# Patient Record
Sex: MALE | Race: ASIAN | NOT HISPANIC OR LATINO | Employment: FULL TIME | URBAN - METROPOLITAN AREA
[De-identification: names, ages, dates, MRNs, and addresses within clinical notes are randomized per-mention and may not be internally consistent; named-entity substitution may affect disease eponyms.]

---

## 2017-04-19 DIAGNOSIS — I10 ESSENTIAL HYPERTENSION: ICD-10-CM

## 2017-04-19 DIAGNOSIS — I48.19 ATRIAL FIBRILLATION, PERSISTENT: Chronic | ICD-10-CM

## 2017-04-19 RX ORDER — METOPROLOL SUCCINATE 50 MG/1
TABLET, EXTENDED RELEASE ORAL
Qty: 30 TABLET | Refills: 2 | Status: ON HOLD | OUTPATIENT
Start: 2017-04-19 | End: 2018-09-05 | Stop reason: SDUPTHER

## 2018-02-08 ENCOUNTER — OFFICE VISIT (OUTPATIENT)
Dept: INTERNAL MEDICINE | Facility: CLINIC | Age: 48
End: 2018-02-08
Payer: COMMERCIAL

## 2018-02-08 VITALS
WEIGHT: 181.88 LBS | BODY MASS INDEX: 28.55 KG/M2 | OXYGEN SATURATION: 98 % | HEIGHT: 67 IN | HEART RATE: 52 BPM | SYSTOLIC BLOOD PRESSURE: 124 MMHG | TEMPERATURE: 98 F | DIASTOLIC BLOOD PRESSURE: 76 MMHG

## 2018-02-08 DIAGNOSIS — J06.9 UPPER RESPIRATORY TRACT INFECTION, UNSPECIFIED TYPE: Primary | ICD-10-CM

## 2018-02-08 DIAGNOSIS — J02.9 SORE THROAT: ICD-10-CM

## 2018-02-08 DIAGNOSIS — Z23 NEED FOR INFLUENZA VACCINATION: ICD-10-CM

## 2018-02-08 DIAGNOSIS — Z00.00 PREVENTATIVE HEALTH CARE: ICD-10-CM

## 2018-02-08 PROCEDURE — 99213 OFFICE O/P EST LOW 20 MIN: CPT | Mod: PO | Performed by: INTERNAL MEDICINE

## 2018-02-08 PROCEDURE — 3008F BODY MASS INDEX DOCD: CPT | Mod: S$GLB,,, | Performed by: INTERNAL MEDICINE

## 2018-02-08 PROCEDURE — 99999 PR PBB SHADOW E&M-EST. PATIENT-LVL III: CPT | Mod: PBBFAC,,, | Performed by: INTERNAL MEDICINE

## 2018-02-08 PROCEDURE — 90471 IMMUNIZATION ADMIN: CPT | Mod: S$GLB,,, | Performed by: INTERNAL MEDICINE

## 2018-02-08 PROCEDURE — 90686 IIV4 VACC NO PRSV 0.5 ML IM: CPT | Mod: S$GLB,,, | Performed by: INTERNAL MEDICINE

## 2018-02-08 PROCEDURE — 99213 OFFICE O/P EST LOW 20 MIN: CPT | Mod: 25,S$GLB,, | Performed by: INTERNAL MEDICINE

## 2018-02-08 NOTE — PATIENT INSTRUCTIONS
flonase nasal spray 2 spays each nostril for nasal congestion, post nasal drip, runny nose    Delsym as needed for cough    Allegra or zyrtec for allergies, post nasal drip, runny nose, watery eyes.    cepacol throat lozenges for sore throat    mucinex for chest congestion.     Tylenol or ibuprofen for pain.

## 2018-02-08 NOTE — PROGRESS NOTES
"Subjective:      Patient ID: Renzo Salgado is a 47 y.o. male.    Chief Complaint: Sore Throat (x 3 days) and Cough    48 yo with Patient Active Problem List:     Atrial fibrillation, persistent     Essential hypertension     Sinusitis    Past Medical History:  No date: Atrial fibrillation  No date: Hypertension  No date: Sleep apnea    Here today c/o sore throat.      Sore Throat    This is a new problem. The current episode started in the past 7 days. The problem has been rapidly improving. Neither side of throat is experiencing more pain than the other. There has been no fever. The pain is mild. Associated symptoms include congestion (nasal) and coughing. Pertinent negatives include no abdominal pain, diarrhea, drooling, ear discharge, ear pain, headaches, hoarse voice, plugged ear sensation, neck pain, shortness of breath or vomiting. He has had no exposure to strep or mono. He has tried cool liquids for the symptoms. The treatment provided moderate relief.     Review of Systems   Constitutional: Negative for chills and fever.   HENT: Positive for congestion (nasal) and sore throat. Negative for drooling, ear discharge, ear pain and hoarse voice.    Respiratory: Positive for cough. Negative for shortness of breath and wheezing.    Cardiovascular: Negative for chest pain.   Gastrointestinal: Negative for abdominal pain, blood in stool, diarrhea and vomiting.   Musculoskeletal: Negative for neck pain.   Neurological: Negative for headaches.     Objective:   /76 (BP Location: Right arm, Patient Position: Sitting)   Pulse (!) 52   Temp 97.7 °F (36.5 °C) (Tympanic)   Ht 5' 7" (1.702 m)   Wt 82.5 kg (181 lb 14.1 oz)   SpO2 98%   BMI 28.49 kg/m²     Physical Exam   Constitutional: He is oriented to person, place, and time. He appears well-developed and well-nourished. No distress.   HENT:   Head: Normocephalic and atraumatic.   Right Ear: Tympanic membrane normal.   Left Ear: Tympanic membrane normal. "   Nose: Rhinorrhea present. No mucosal edema. Right sinus exhibits no maxillary sinus tenderness and no frontal sinus tenderness. Left sinus exhibits no maxillary sinus tenderness and no frontal sinus tenderness.   Mouth/Throat: Posterior oropharyngeal erythema present. No oropharyngeal exudate or posterior oropharyngeal edema.   Eyes: EOM are normal. Pupils are equal, round, and reactive to light.   Neck: Neck supple.   Cardiovascular: Normal rate and regular rhythm.    Pulmonary/Chest: Breath sounds normal. He has no wheezes. He has no rales.   Lymphadenopathy:     He has no cervical adenopathy.   Neurological: He is alert and oriented to person, place, and time.   Skin: Skin is warm and dry.   Psychiatric: He has a normal mood and affect. His behavior is normal.       Assessment:     1. Upper respiratory tract infection, unspecified type    2. Sore throat    3. Need for influenza vaccination    4. Preventative health care      Plan:   Upper respiratory tract infection, unspecified type    Sore throat    Need for influenza vaccination    Preventative health care  -     Comprehensive metabolic panel; Future; Expected date: 02/08/2018  -     CBC auto differential; Future; Expected date: 02/08/2018  -     Lipid panel; Future; Expected date: 02/08/2018  -     TSH; Future; Expected date: 02/08/2018  -     Hemoglobin A1c; Future; Expected date: 02/08/2018  -     PSA, Screening; Future; Expected date: 02/08/2018    Other orders  -     Influenza - Quadrivalent (3 years & older) (PF)    flonase nasal spray 2 spays each nostril for nasal congestion, post nasal drip, runny nose    Delsym as needed for cough    Allegra or zyrtec for allergies, post nasal drip, runny nose, watery eyes.    cepacol throat lozenges for sore throat    mucinex for chest congestion.     Tylenol or ibuprofen for pain.                Problem List Items Addressed This Visit     None      Visit Diagnoses     Upper respiratory tract infection,  unspecified type    -  Primary    Sore throat        Need for influenza vaccination        Preventative health care        Relevant Orders    Comprehensive metabolic panel    CBC auto differential    Lipid panel    TSH    Hemoglobin A1c    PSA, Screening          Follow-up in about 2 weeks (around 2/22/2018), or if symptoms worsen or fail to improve.

## 2018-02-09 ENCOUNTER — PATIENT OUTREACH (OUTPATIENT)
Dept: ADMINISTRATIVE | Facility: HOSPITAL | Age: 48
End: 2018-02-09

## 2018-06-10 ENCOUNTER — HOSPITAL ENCOUNTER (EMERGENCY)
Facility: HOSPITAL | Age: 48
Discharge: HOME | End: 2018-06-10
Attending: EMERGENCY MEDICINE
Payer: COMMERCIAL

## 2018-06-10 VITALS
DIASTOLIC BLOOD PRESSURE: 79 MMHG | TEMPERATURE: 98 F | BODY MASS INDEX: 27.88 KG/M2 | OXYGEN SATURATION: 98 % | HEART RATE: 80 BPM | SYSTOLIC BLOOD PRESSURE: 165 MMHG | WEIGHT: 178 LBS | RESPIRATION RATE: 18 BRPM

## 2018-06-10 DIAGNOSIS — L50.9 FULL BODY HIVES: Primary | ICD-10-CM

## 2018-06-10 PROCEDURE — 99283 EMERGENCY DEPT VISIT LOW MDM: CPT

## 2018-06-10 RX ORDER — DIPHENHYDRAMINE HCL 50 MG
50 CAPSULE ORAL EVERY 6 HOURS PRN
Qty: 20 CAPSULE | Refills: 0 | Status: SHIPPED | OUTPATIENT
Start: 2018-06-10 | End: 2018-07-09

## 2018-06-10 NOTE — ED PROVIDER NOTES
SCRIBE #1 NOTE: I, Janette Mcgee, am scribing for, and in the presence of, Lesly Guzman MD. I have scribed the entire note.      History      Chief Complaint   Patient presents with    Urticaria     pt treated for hives, but wants second opinion       Review of patient's allergies indicates:  No Known Allergies     HPI   HPI    6/10/2018, 7:21 AM   History obtained from the patient      History of Present Illness: Renzo Salgado is a 47 y.o. male patient who presents to the Emergency Department for rash which onset gradually yesterday. Patient reports eating shrimp yesterday and then began having hives. Patient reports being evaluated for sxs yesterday at Urgent Care and was given steroid shot and prescribed Zyrtec, Pepcid, and steroid pack. Patient states he has not had the prescriptions filled. Patient denies prior tx with Benadryl. Symptoms are constant and moderate in severity. The patient describes the sxs as located to extremities and trunk. No mitigating or exacerbating factors reported. Associated sxs include pruritis. Patient denies any fever, chills, tongue swelling, difficulty swallowing, CP, SOB, wheezing, abd pain, N/V/D, and all other sxs at this time. No further complaints or concerns at this time.     Arrival mode: Personal vehicle      PCP: Fadi Damico MD       Past Medical History:  Past Medical History:   Diagnosis Date    Atrial fibrillation     Hypertension     Sleep apnea        Past Surgical History:  History reviewed. No pertinent surgical history.      Family History:  Family History   Problem Relation Age of Onset    Hypertension Mother     Melanoma Neg Hx     Psoriasis Neg Hx     Lupus Neg Hx        Social History:  Social History     Social History Main Topics    Smoking status: Never Smoker    Smokeless tobacco: Never Used    Alcohol use No    Drug use: No    Sexual activity: Unknown       ROS   Review of Systems   Constitutional: Negative for chills and  fever.   HENT: Negative for sore throat and trouble swallowing.         (-) tongue swelling   Respiratory: Negative for shortness of breath and wheezing.    Cardiovascular: Negative for chest pain.   Gastrointestinal: Negative for abdominal pain, diarrhea, nausea and vomiting.   Genitourinary: Negative for dysuria.   Musculoskeletal: Negative for back pain.   Skin: Positive for rash (extremities and trunk).        (+) pruritis   Neurological: Negative for weakness.   Hematological: Does not bruise/bleed easily.   All other systems reviewed and are negative.      Physical Exam      Initial Vitals [06/10/18 0700]   BP Pulse Resp Temp SpO2   (!) 165/79 80 18 98 °F (36.7 °C) 98 %      MAP       107.67          Physical Exam  Nursing Notes and Vital Signs Reviewed.  Constitutional: Patient is in no acute distress. Well-developed and well-nourished.  Head: Atraumatic. Normocephalic.  Eyes: PERRL. EOM intact. Conjunctivae are not pale. No scleral icterus.  ENT: Mucous membranes are moist. Oropharynx is clear and symmetric. No tongue edema, airway patent.  Neck: Supple. Full ROM. No lymphadenopathy.  Cardiovascular: Regular rate. Regular rhythm. No murmurs, rubs, or gallops. Distal pulses are 2+ and symmetric.  Pulmonary/Chest: No respiratory distress. Clear to auscultation bilaterally. No wheezing or rales.  Abdominal: Soft and non-distended.  There is no tenderness.  No rebound, guarding, or rigidity. Good bowel sounds.  Genitourinary: No CVA tenderness  Musculoskeletal: Moves all extremities. No obvious deformities. No edema. No calf tenderness.  Skin: Warm and dry. Diffuse hives to trunk and BUE.   Neurological:  Alert, awake, and appropriate.  Normal speech.  No acute focal neurological deficits are appreciated.  Psychiatric: Normal affect. Good eye contact. Appropriate in content.    ED Course    Procedures  ED Vital Signs:  Vitals:    06/10/18 0700   BP: (!) 165/79   Pulse: 80   Resp: 18   Temp: 98 °F (36.7 °C)    SpO2: 98%   Weight: 80.7 kg (178 lb)              The Emergency Provider reviewed the vital signs and test results, which are outlined above.    ED Discussion     7:30 AM: Reassessed pt at this time. Discussed with pt all pertinent ED information and results. Discussed pt dx and plan of tx. Gave pt all f/u and return to the ED instructions. All questions and concerns were addressed at this time. Pt expresses understanding of information and instructions, and is comfortable with plan to discharge. Pt is stable for discharge.    I discussed with patient and/or family/caretaker that evaluation in the ED does not suggest any emergent or life threatening medical conditions requiring immediate intervention beyond what was provided in the ED, and I believe patient is safe for discharge.  Regardless, an unremarkable evaluation in the ED does not preclude the development or presence of a serious of life threatening condition. As such, patient was instructed to return immediately for any worsening or change in current symptoms.      ED Medication(s):  Medications - No data to display    New Prescriptions    DIPHENHYDRAMINE (BENADRYL) 50 MG CAPSULE    Take 1 capsule (50 mg total) by mouth every 6 (six) hours as needed for Itching or Allergies.       Follow-up Information     Fadi Damico MD. Schedule an appointment as soon as possible for a visit in 2 days.    Specialty:  Internal Medicine  Why:  Return to the Emergency Room, If symptoms worsen  Contact information:  [address]  [place] [address]  [phone]                     Medical Decision Making              Scribe Attestation:   Scribe #1: I performed the above scribed service and the documentation accurately describes the services I performed. I attest to the accuracy of the note.    Attending:   Physician Attestation Statement for Scribe #1: I, Lesly Guzman MD, personally performed the services described in this documentation, as scribed by  Janette Mcgee, in my presence, and it is both accurate and complete.          Clinical Impression       ICD-10-CM ICD-9-CM   1. Full body hives L50.9 708.8       Disposition:   Disposition: Discharged  Condition: Stable         Lesly Guzman MD  06/10/18 0952

## 2018-07-07 ENCOUNTER — OFFICE VISIT (OUTPATIENT)
Dept: URGENT CARE | Facility: CLINIC | Age: 48
End: 2018-07-07
Payer: COMMERCIAL

## 2018-07-07 ENCOUNTER — PATIENT MESSAGE (OUTPATIENT)
Dept: INTERNAL MEDICINE | Facility: CLINIC | Age: 48
End: 2018-07-07

## 2018-07-07 VITALS
HEIGHT: 67 IN | DIASTOLIC BLOOD PRESSURE: 84 MMHG | TEMPERATURE: 98 F | OXYGEN SATURATION: 97 % | HEART RATE: 86 BPM | WEIGHT: 183.19 LBS | RESPIRATION RATE: 17 BRPM | SYSTOLIC BLOOD PRESSURE: 114 MMHG | BODY MASS INDEX: 28.75 KG/M2

## 2018-07-07 DIAGNOSIS — I48.19 ATRIAL FIBRILLATION, PERSISTENT: Chronic | ICD-10-CM

## 2018-07-07 DIAGNOSIS — Z79.01 ON ANTICOAGULANT THERAPY: ICD-10-CM

## 2018-07-07 DIAGNOSIS — R31.0 GROSS HEMATURIA: Primary | ICD-10-CM

## 2018-07-07 LAB
BILIRUB UR QL STRIP: ABNORMAL
CLARITY UR: ABNORMAL
COLOR UR: ABNORMAL
GLUCOSE UR QL STRIP: ABNORMAL
HGB UR QL STRIP: ABNORMAL
KETONES UR QL STRIP: ABNORMAL
LEUKOCYTE ESTERASE UR QL STRIP: ABNORMAL
MICROSCOPIC COMMENT: ABNORMAL
NITRITE UR QL STRIP: ABNORMAL
PH UR STRIP: ABNORMAL [PH] (ref 5–8)
PROT UR QL STRIP: ABNORMAL
RBC #/AREA URNS HPF: >100 /HPF (ref 0–4)
SP GR UR STRIP: ABNORMAL (ref 1–1.03)
URN SPEC COLLECT METH UR: ABNORMAL
WBC #/AREA URNS HPF: 0 /HPF (ref 0–5)
WBC CLUMPS URNS QL MICRO: ABNORMAL

## 2018-07-07 PROCEDURE — 3008F BODY MASS INDEX DOCD: CPT | Mod: CPTII,S$GLB,, | Performed by: NURSE PRACTITIONER

## 2018-07-07 PROCEDURE — 3079F DIAST BP 80-89 MM HG: CPT | Mod: CPTII,S$GLB,, | Performed by: NURSE PRACTITIONER

## 2018-07-07 PROCEDURE — 87086 URINE CULTURE/COLONY COUNT: CPT

## 2018-07-07 PROCEDURE — 3074F SYST BP LT 130 MM HG: CPT | Mod: CPTII,S$GLB,, | Performed by: NURSE PRACTITIONER

## 2018-07-07 PROCEDURE — 99214 OFFICE O/P EST MOD 30 MIN: CPT | Mod: S$GLB,,, | Performed by: NURSE PRACTITIONER

## 2018-07-07 PROCEDURE — 99999 PR PBB SHADOW E&M-EST. PATIENT-LVL IV: CPT | Mod: PBBFAC,,, | Performed by: NURSE PRACTITIONER

## 2018-07-07 PROCEDURE — 81000 URINALYSIS NONAUTO W/SCOPE: CPT | Mod: PO

## 2018-07-07 NOTE — PATIENT INSTRUCTIONS
Blood in the Urine    Blood in the urine (hematuria) has many possible causes. If it occurs after an injury (such as a car accident or fall), it is most often a sign of bruising to the kidney or bladder. Common causes of blood in the urine include urinary tract infections, kidney stones, inflammation, tumors, or certain other diseases of the kidney or bladder. Menstruation can cause blood to appear in the urine sample, although it is not coming from the urinary tract.  If only a trace amount of blood is present, it will show up on the urine test, even though the urine may be yellow and not pink or red. This may occur with any of the above conditions, as well as heavy exercise or high fever. In this case, your doctor may want to repeat the urine test on another day. This will show if the blood is still present. If it is, then other tests can be done to find out the cause.  Home care  Follow these home care guidelines:  · If your urine does not appear bloody (pink, brown or red) then you do not need to restrict your activity in any way.  · If you can see blood in your urine, rest and avoid heavy exertion until your next exam. Do not use aspirin, blood thinners, or anti-platelet or anti-inflammatory medicines. These include ibuprofen and naproxen. These thin the blood and may increase bleeding.  Follow-up care  Follow up with your healthcare provider, or as advised. If you were injured and had blood in your urine, you should have a repeat urine test in 1 to 2 days. Contact your doctor for this test.  A radiologist will review any X-rays that were taken. You will be told of any new findings that may affect your care.  When to seek medical advice  Call your healthcare provider right away if any of these occur:  · Bright red blood or blood clots in the urine (if you did not have this before)  · Weakness, dizziness or fainting  · New groin, abdominal, or back pain  · Fever of 100.4ºF (38ºC) or higher, or as directed by  your healthcare provider  · Repeated vomiting  · Bleeding from the nose or gums or easy bruising  Date Last Reviewed: 9/1/2016  © 6699-7324 [employer]. [address] [place] [address], [place] [address]. All rights reserved. This information is not intended as a substitute for professional medical care. Always follow your healthcare professional's instructions.

## 2018-07-07 NOTE — PROGRESS NOTES
"Subjective:      Patient ID: Renzo Salgado is a 47 y.o. male.    Chief Complaint: Hematuria    Hematuria   This is a new problem. The current episode started today. The problem is unchanged. He describes the hematuria as gross hematuria. The hematuria occurs throughout his entire urinary stream. He reports no clotting in his urine stream. He is experiencing no pain. He describes his urine color as bright red. Irritative symptoms do not include frequency, nocturia or urgency. Pertinent negatives include no abdominal pain, dysuria, fever, flank pain, genital pain, inability to urinate, nausea or vomiting. (A-fib on Eliquis)     Review of Systems   Constitutional: Negative.  Negative for fever.   Respiratory: Negative.    Cardiovascular: Negative.    Gastrointestinal: Negative.  Negative for abdominal pain, nausea and vomiting.   Genitourinary: Positive for hematuria. Negative for dysuria, flank pain, frequency, nocturia and urgency.   Musculoskeletal: Negative.    Skin: Negative.    Neurological: Negative.    Hematological: Negative for adenopathy. Bruises/bleeds easily.       Objective:   /84 (BP Location: Right arm, Patient Position: Sitting, BP Method: Small (Manual))   Pulse 86   Temp 98.4 °F (36.9 °C) (Tympanic)   Resp 17   Ht 5' 7" (1.702 m)   Wt 83.1 kg (183 lb 3.2 oz)   SpO2 97%   BMI 28.69 kg/m²   Physical Exam   Constitutional: He is oriented to person, place, and time. He appears well-developed and well-nourished. No distress.   HENT:   Head: Normocephalic and atraumatic.   Neck: Normal range of motion. Neck supple.   Cardiovascular: Normal rate.    Pulmonary/Chest: Effort normal. No respiratory distress.   Abdominal: Soft. There is no tenderness.   Neurological: He is alert and oriented to person, place, and time.   Skin: Skin is warm and dry. No rash noted. He is not diaphoretic.   Nursing note and vitals reviewed.    Assessment:      1. Gross hematuria    2. Atrial fibrillation, " persistent    3. On anticoagulant therapy       Plan:   Gross hematuria  -     Urinalysis  -     Urine culture    Atrial fibrillation, persistent    On anticoagulant therapy  Comments:  Hold Eliquis for now. Keep follow up with Dr. Damico Monday. ER for difficulty passing urine, abdominal pain, chest pain, or shortness of breath.    Gross blood noted from sample. Unable to perform dipstick to rule out infection. Will send off for UA and follow up with Leigh Ann Brennangarcia if antibiotics are necessary.  Hold Eliquis today and tomorrow. Discussed risks with holding medication vs bleeding risk with continuing medication.  Keep appointment with Dr. Damico on Monday for recheck.  Red flag symptoms that warrant emergency department eval were reviewed.

## 2018-07-08 LAB — BACTERIA UR CULT: NO GROWTH

## 2018-07-09 ENCOUNTER — HOSPITAL ENCOUNTER (OUTPATIENT)
Dept: RADIOLOGY | Facility: HOSPITAL | Age: 48
Discharge: HOME | End: 2018-07-09
Attending: INTERNAL MEDICINE
Payer: COMMERCIAL

## 2018-07-09 ENCOUNTER — OFFICE VISIT (OUTPATIENT)
Dept: INTERNAL MEDICINE | Facility: CLINIC | Age: 48
End: 2018-07-09
Payer: COMMERCIAL

## 2018-07-09 VITALS
HEART RATE: 70 BPM | HEIGHT: 67 IN | TEMPERATURE: 99 F | OXYGEN SATURATION: 98 % | WEIGHT: 183.63 LBS | SYSTOLIC BLOOD PRESSURE: 120 MMHG | DIASTOLIC BLOOD PRESSURE: 90 MMHG | BODY MASS INDEX: 28.82 KG/M2

## 2018-07-09 DIAGNOSIS — R31.9 HEMATURIA, UNSPECIFIED TYPE: Primary | ICD-10-CM

## 2018-07-09 DIAGNOSIS — R31.9 HEMATURIA, UNSPECIFIED TYPE: ICD-10-CM

## 2018-07-09 DIAGNOSIS — I48.19 ATRIAL FIBRILLATION, PERSISTENT: Chronic | ICD-10-CM

## 2018-07-09 PROCEDURE — 74018 RADEX ABDOMEN 1 VIEW: CPT | Mod: 26,,, | Performed by: RADIOLOGY

## 2018-07-09 PROCEDURE — 99214 OFFICE O/P EST MOD 30 MIN: CPT | Mod: S$GLB,,, | Performed by: INTERNAL MEDICINE

## 2018-07-09 PROCEDURE — 3080F DIAST BP >= 90 MM HG: CPT | Mod: CPTII,S$GLB,, | Performed by: INTERNAL MEDICINE

## 2018-07-09 PROCEDURE — 99999 PR PBB SHADOW E&M-EST. PATIENT-LVL III: CPT | Mod: PBBFAC,,, | Performed by: INTERNAL MEDICINE

## 2018-07-09 PROCEDURE — 74018 RADEX ABDOMEN 1 VIEW: CPT | Mod: TC,FY,PO

## 2018-07-09 PROCEDURE — 3008F BODY MASS INDEX DOCD: CPT | Mod: CPTII,S$GLB,, | Performed by: INTERNAL MEDICINE

## 2018-07-09 PROCEDURE — 3074F SYST BP LT 130 MM HG: CPT | Mod: CPTII,S$GLB,, | Performed by: INTERNAL MEDICINE

## 2018-07-09 NOTE — PROGRESS NOTES
"Subjective:      Patient ID: Renzo Salgado is a 47 y.o. male.    Chief Complaint: Follow-up (urgent care on 7/7/18)    48 yo with Patient Active Problem List:     Atrial fibrillation, persistent     Essential hypertension    Here today for eval of gross  hematuria. Dx recently with urgent care. No gross hematuria x 2 days.  Lower abd pain alejandro nearly resolved. Now at level 2/10 and intermittent. No pain today.       Review of Systems   Constitutional: Negative for chills and fever.   HENT: Negative for ear pain and sore throat.    Respiratory: Negative for cough.    Cardiovascular: Negative for chest pain.   Gastrointestinal: Negative for abdominal pain and blood in stool.   Genitourinary: Negative for decreased urine volume, difficulty urinating, dysuria, flank pain, penile pain and urgency.   Musculoskeletal: Negative for back pain.   Neurological: Negative for seizures and syncope.     Objective:   BP (!) 120/90 (BP Location: Left arm, Patient Position: Sitting)   Pulse 70   Temp 98.7 °F (37.1 °C) (Tympanic)   Ht 5' 7" (1.702 m)   Wt 83.3 kg (183 lb 10.3 oz)   SpO2 98%   BMI 28.76 kg/m²     Physical Exam   Constitutional: He is oriented to person, place, and time. He appears well-developed and well-nourished. No distress.   HENT:   Head: Atraumatic.   Right Ear: External ear normal.   Eyes: Conjunctivae and EOM are normal.   Cardiovascular: Normal rate.    irreg   Pulmonary/Chest: Effort normal and breath sounds normal.   Abdominal: Soft. Bowel sounds are normal.   Musculoskeletal: He exhibits no edema.   Neurological: He is alert and oriented to person, place, and time.   Skin: Skin is warm and dry.   Psychiatric: He has a normal mood and affect. His behavior is normal.   no cva ttp    Assessment:     1. Hematuria, unspecified type    2. Atrial fibrillation, persistent      Plan:   Hematuria, unspecified type  -     Urinalysis; Future; Expected date: 07/09/2018  -     X-Ray KUB; Future; Expected " date: 07/09/2018    Atrial fibrillation, persistent    stable  Holding eliquis due to bleeding    Lab Frequency Next Occurrence   Comprehensive metabolic panel Once 02/08/2018   Lipid panel Once 02/08/2018   TSH Once 02/08/2018   Hemoglobin A1c Once 02/08/2018       Problem List Items Addressed This Visit        Cardiac/Vascular    Atrial fibrillation, persistent (Chronic)      Other Visit Diagnoses     Hematuria, unspecified type    -  Primary    Relevant Orders    Urinalysis (Completed)    X-Ray KUB (Completed)          Follow-up if symptoms worsen or fail to improve.

## 2018-07-10 ENCOUNTER — PATIENT MESSAGE (OUTPATIENT)
Dept: INTERNAL MEDICINE | Facility: CLINIC | Age: 48
End: 2018-07-10

## 2018-07-10 DIAGNOSIS — N39.0 URINARY TRACT INFECTION WITHOUT HEMATURIA, SITE UNSPECIFIED: Primary | ICD-10-CM

## 2018-07-10 DIAGNOSIS — R31.9 HEMATURIA, UNSPECIFIED TYPE: Primary | ICD-10-CM

## 2018-07-10 DIAGNOSIS — R31.9 HEMATURIA, UNSPECIFIED TYPE: ICD-10-CM

## 2018-07-10 NOTE — TELEPHONE ENCOUNTER
Continues with mild blood in urine. Kidney stones in left kidney identified on x-ray.Remain off of Eliquis.  Recheck urinalysis in 3 days. Plan will be to resume eliquis if bleeding resolves.  If gross bleeding recurs or pain worsens pt will need to see urology.

## 2018-07-13 ENCOUNTER — LAB VISIT (OUTPATIENT)
Dept: LAB | Facility: HOSPITAL | Age: 48
End: 2018-07-13
Attending: INTERNAL MEDICINE
Payer: COMMERCIAL

## 2018-07-13 ENCOUNTER — PATIENT MESSAGE (OUTPATIENT)
Dept: INTERNAL MEDICINE | Facility: CLINIC | Age: 48
End: 2018-07-13

## 2018-07-13 DIAGNOSIS — R31.9 HEMATURIA, UNSPECIFIED TYPE: Primary | ICD-10-CM

## 2018-07-13 DIAGNOSIS — R31.9 HEMATURIA, UNSPECIFIED TYPE: ICD-10-CM

## 2018-07-13 LAB
BACTERIA #/AREA URNS HPF: NORMAL /HPF
BILIRUB UR QL STRIP: NEGATIVE
CLARITY UR: CLEAR
COLOR UR: ABNORMAL
GLUCOSE UR QL STRIP: NEGATIVE
HGB UR QL STRIP: ABNORMAL
KETONES UR QL STRIP: NEGATIVE
LEUKOCYTE ESTERASE UR QL STRIP: NEGATIVE
MICROSCOPIC COMMENT: NORMAL
NITRITE UR QL STRIP: NEGATIVE
PH UR STRIP: 7 [PH] (ref 5–8)
PROT UR QL STRIP: NEGATIVE
RBC #/AREA URNS HPF: 3 /HPF (ref 0–4)
SP GR UR STRIP: <=1.005 (ref 1–1.03)
URN SPEC COLLECT METH UR: ABNORMAL
WBC #/AREA URNS HPF: 1 /HPF (ref 0–5)

## 2018-07-13 PROCEDURE — 81000 URINALYSIS NONAUTO W/SCOPE: CPT | Mod: PO

## 2018-07-13 NOTE — TELEPHONE ENCOUNTER
Blood in urine is improved. rec resume eliquis. Recheck ua on Monday. Will need to stop eliquis and see urology if he sees blood in his urine.

## 2018-07-16 ENCOUNTER — TELEPHONE (OUTPATIENT)
Dept: INTERNAL MEDICINE | Facility: CLINIC | Age: 48
End: 2018-07-16

## 2018-07-16 ENCOUNTER — LAB VISIT (OUTPATIENT)
Dept: LAB | Facility: HOSPITAL | Age: 48
End: 2018-07-16
Attending: INTERNAL MEDICINE
Payer: COMMERCIAL

## 2018-07-16 DIAGNOSIS — R31.9 HEMATURIA, UNSPECIFIED TYPE: Primary | ICD-10-CM

## 2018-07-16 DIAGNOSIS — R31.9 HEMATURIA, UNSPECIFIED TYPE: ICD-10-CM

## 2018-07-16 DIAGNOSIS — N20.0 NEPHROLITHIASIS: ICD-10-CM

## 2018-07-16 LAB
BILIRUB UR QL STRIP: NEGATIVE
CLARITY UR: CLEAR
COLOR UR: YELLOW
GLUCOSE UR QL STRIP: NEGATIVE
HGB UR QL STRIP: ABNORMAL
KETONES UR QL STRIP: NEGATIVE
LEUKOCYTE ESTERASE UR QL STRIP: NEGATIVE
MICROSCOPIC COMMENT: ABNORMAL
NITRITE UR QL STRIP: NEGATIVE
PH UR STRIP: 7 [PH] (ref 5–8)
PROT UR QL STRIP: NEGATIVE
RBC #/AREA URNS HPF: >100 /HPF (ref 0–4)
SP GR UR STRIP: 1.01 (ref 1–1.03)
URN SPEC COLLECT METH UR: ABNORMAL

## 2018-07-16 PROCEDURE — 81000 URINALYSIS NONAUTO W/SCOPE: CPT | Mod: PO

## 2018-07-16 NOTE — TELEPHONE ENCOUNTER
Unfortunately blood has returned to urine. Needs to stop eliquis and see urology. Also needs appt with cardiology to discuss anticoagulation in light of bleeding on eliquis.

## 2018-07-17 NOTE — TELEPHONE ENCOUNTER
Spoke with pt, notified him that unfortunately blood has returned to urine and needs to stop Eliquis and see Urology. Also, needs to see Cardiology to discuss anticoagulation of bleeding on Eliquis. Pt verbalized understanding and scheduled next available Urology appointment for 10/24/18 at 7 am. Pt added to wait list. Also, informed pt to contact insurance company to see which other Urologists are in network and that may have a sooner appointment.

## 2018-07-18 ENCOUNTER — PATIENT MESSAGE (OUTPATIENT)
Dept: INTERNAL MEDICINE | Facility: CLINIC | Age: 48
End: 2018-07-18

## 2018-07-18 ENCOUNTER — TELEPHONE (OUTPATIENT)
Dept: INTERNAL MEDICINE | Facility: CLINIC | Age: 48
End: 2018-07-18

## 2018-08-27 ENCOUNTER — HOSPITAL ENCOUNTER (OUTPATIENT)
Dept: RADIOLOGY | Facility: HOSPITAL | Age: 48
Discharge: HOME | End: 2018-08-27
Attending: UROLOGY
Payer: COMMERCIAL

## 2018-08-27 ENCOUNTER — TELEPHONE (OUTPATIENT)
Dept: RADIOLOGY | Facility: HOSPITAL | Age: 48
End: 2018-08-27

## 2018-08-27 ENCOUNTER — OFFICE VISIT (OUTPATIENT)
Dept: UROLOGY | Facility: CLINIC | Age: 48
End: 2018-08-27
Payer: COMMERCIAL

## 2018-08-27 VITALS
WEIGHT: 181.69 LBS | BODY MASS INDEX: 28.52 KG/M2 | SYSTOLIC BLOOD PRESSURE: 128 MMHG | HEIGHT: 67 IN | HEART RATE: 50 BPM | DIASTOLIC BLOOD PRESSURE: 84 MMHG

## 2018-08-27 DIAGNOSIS — N20.0 RENAL STONE: ICD-10-CM

## 2018-08-27 DIAGNOSIS — N20.0 RENAL STONE: Primary | ICD-10-CM

## 2018-08-27 LAB
BILIRUB SERPL-MCNC: NORMAL MG/DL
BLOOD URINE, POC: 50
COLOR, POC UA: YELLOW
GLUCOSE UR QL STRIP: NORMAL
KETONES UR QL STRIP: NORMAL
LEUKOCYTE ESTERASE URINE, POC: NORMAL
NITRITE, POC UA: NORMAL
PH, POC UA: 5
PROTEIN, POC: NORMAL
SPECIFIC GRAVITY, POC UA: 1.01
UROBILINOGEN, POC UA: NORMAL

## 2018-08-27 PROCEDURE — 93000 ELECTROCARDIOGRAM COMPLETE: CPT | Mod: S$GLB,,, | Performed by: INTERNAL MEDICINE

## 2018-08-27 PROCEDURE — 74018 RADEX ABDOMEN 1 VIEW: CPT | Mod: 26,,, | Performed by: RADIOLOGY

## 2018-08-27 PROCEDURE — 74018 RADEX ABDOMEN 1 VIEW: CPT | Mod: TC

## 2018-08-27 PROCEDURE — 99999 PR PBB SHADOW E&M-EST. PATIENT-LVL IV: CPT | Mod: PBBFAC,,, | Performed by: UROLOGY

## 2018-08-27 PROCEDURE — 71046 X-RAY EXAM CHEST 2 VIEWS: CPT | Mod: 26,,, | Performed by: RADIOLOGY

## 2018-08-27 PROCEDURE — 71046 X-RAY EXAM CHEST 2 VIEWS: CPT | Mod: TC

## 2018-08-27 PROCEDURE — 99244 OFF/OP CNSLTJ NEW/EST MOD 40: CPT | Mod: 25,S$GLB,, | Performed by: UROLOGY

## 2018-08-27 PROCEDURE — 81002 URINALYSIS NONAUTO W/O SCOPE: CPT | Mod: S$GLB,,, | Performed by: UROLOGY

## 2018-08-27 NOTE — PROGRESS NOTES
Chief Complaint: Hematuria/Kidney Stones    HPI:   8/27/18: 47 yo man on eliquis saw gross hematuria last month, had a KUB that shows two 5-6mm left renal stones.  He went to Dr. Myers and a CT was done that shows the same stones.  Was scheduled for ESWL and his HR went to 130 during the procedure.  Was sent away for cardiology clearance that was given by cardiologist with low risk.  Happened again at 120 then 80-85 HR and Dr. Myers did not want to schedule it a third time.  Seems no anesthesia was given for the procedures.  ESWL was never started.  No abd/pelvic pain and no exac/rel factors.  No hematuria.  No urolithiasis.  No urinary bother.  No  history.  Normal sexual function.  Referred by Dr. Damico.    Allergies:  Patient has no known allergies.    Medications:  has a current medication list which includes the following prescription(s): dronedarone, eliquis, lisinopril, and metoprolol succinate.    Review of Systems:  General: No fever, chills, fatigability, or weight loss.  Skin: No rashes, itching, or changes in color or texture of skin.  Chest: Denies [place], cyanosis, wheezing, cough, and sputum production.  Abdomen: Appetite fine. No weight loss. Denies diarrhea, abdominal pain, hematemesis, or blood in stool.  Musculoskeletal: No joint stiffness or swelling. Denies back pain.  : As above.  All other review of systems negative.    PMH:   has a past medical history of Atrial fibrillation, Hypertension, and Sleep apnea.    PSH:   has no past surgical history on file.    FamHx: family history includes Hypertension in his mother.    SocHx:  reports that  has never smoked. he has never used smokeless tobacco. He reports that he does not drink alcohol or use drugs.      Physical Exam:  Vitals:    08/27/18 0713   BP: 128/84   Pulse: (!) 50     General: A&Ox3, no apparent distress, no deformities  Neck: No masses, normal thyroid  Lungs: normal inspiration, no use of accessory muscles  Heart: normal pulse,  no arrhythmias  Abdomen: Soft, NT, ND, no masses, no hernias, no hepatosplenomegaly  Lymphatic: Neck and groin nodes negative  Skin: The skin is warm and dry. No jaundice.  Ext: No c/c/e.  : Test desc alejandro, no abnormalities of epididymus. Penis normal, with normal penile and scrotal skin. Meatus normal.     Labs/Studies: Urinalysis performed in clinic, summary: UA normal exc 50 blood    Impression/Plan:   1. KUB/CT RSS to confirm current stone findings, enroute to ESWL with local/mac.  Risks/benefits reviewed, consents on chart.

## 2018-08-27 NOTE — LETTER
August 27, 2018      Fadi Damico MD  [address] [facility] [address]  [place] [address]           [facility]  [address] [place] [id number]  Phone: [phone]  Fax: [phone]          Patient: Renzo Salgado   MR Number: [id number]   YOB: 1970   Date of Visit: 8/27/2018       Dear Dr. Fadi Damico:    Thank you for referring Renzo Salgado to me for evaluation. Attached you will find relevant portions of my assessment and plan of care.    If you have questions, please do not hesitate to call me. I look forward to following Renzo Salgado along with you.    Sincerely,    Ricky Baker IV, MD    Enclosure  CC:  No Recipients    If you would like to receive this communication electronically, please contact [email or web address] or [phone] to request more information on [facility] access.    For providers and/or their staff who would like to refer a patient to [facility], please contact us through our one-stop-shop provider referral line, [facility], at [phone].    If you feel you have received this communication in error or would no longer like to receive these types of communications, please e-mail [email or web address]

## 2018-08-27 NOTE — H&P (VIEW-ONLY)
Chief Complaint: Hematuria/Kidney Stones    HPI:   8/27/18: 49 yo man on eliquis saw gross hematuria last month, had a KUB that shows two 5-6mm left renal stones.  He went to Dr. Myers and a CT was done that shows the same stones.  Was scheduled for ESWL and his HR went to 130 during the procedure.  Was sent away for cardiology clearance that was given by cardiologist with low risk.  Happened again at 120 then 80-85 HR and Dr. Myers did not want to schedule it a third time.  Seems no anesthesia was given for the procedures.  ESWL was never started.  No abd/pelvic pain and no exac/rel factors.  No hematuria.  No urolithiasis.  No urinary bother.  No  history.  Normal sexual function.  Referred by Dr. Damico.    Allergies:  Patient has no known allergies.    Medications:  has a current medication list which includes the following prescription(s): dronedarone, eliquis, lisinopril, and metoprolol succinate.    Review of Systems:  General: No fever, chills, fatigability, or weight loss.  Skin: No rashes, itching, or changes in color or texture of skin.  Chest: Denies H[place], cyanosis, wheezing, cough, and sputum production.  Abdomen: Appetite fine. No weight loss. Denies diarrhea, abdominal pain, hematemesis, or blood in stool.  Musculoskeletal: No joint stiffness or swelling. Denies back pain.  : As above.  All other review of systems negative.    PMH:   has a past medical history of Atrial fibrillation, Hypertension, and Sleep apnea.    PSH:   has no past surgical history on file.    FamHx: family history includes Hypertension in his mother.    SocHx:  reports that  has never smoked. he has never used smokeless tobacco. He reports that he does not drink alcohol or use drugs.      Physical Exam:  Vitals:    08/27/18 0713   BP: 128/84   Pulse: (!) 50     General: A&Ox3, no apparent distress, no deformities  Neck: No masses, normal thyroid  Lungs: normal inspiration, no use of accessory muscles  Heart: normal pulse,  no arrhythmias  Abdomen: Soft, NT, ND, no masses, no hernias, no hepatosplenomegaly  Lymphatic: Neck and groin nodes negative  Skin: The skin is warm and dry. No jaundice.  Ext: No c/c/e.  : Test desc alejandro, no abnormalities of epididymus. Penis normal, with normal penile and scrotal skin. Meatus normal.     Labs/Studies: Urinalysis performed in clinic, summary: UA normal exc 50 blood    Impression/Plan:   1. KUB/CT RSS to confirm current stone findings, enroute to ESWL with local/mac.  Risks/benefits reviewed, consents on chart.

## 2018-08-28 ENCOUNTER — HOSPITAL ENCOUNTER (OUTPATIENT)
Dept: RADIOLOGY | Facility: HOSPITAL | Age: 48
Discharge: HOME | End: 2018-08-28
Attending: UROLOGY
Payer: COMMERCIAL

## 2018-08-28 DIAGNOSIS — N20.0 RENAL STONE: ICD-10-CM

## 2018-08-28 PROCEDURE — 74176 CT ABD & PELVIS W/O CONTRAST: CPT | Mod: TC,PO

## 2018-08-28 PROCEDURE — 74176 CT ABD & PELVIS W/O CONTRAST: CPT | Mod: 26,,, | Performed by: RADIOLOGY

## 2018-08-29 ENCOUNTER — TELEPHONE (OUTPATIENT)
Dept: UROLOGY | Facility: CLINIC | Age: 48
End: 2018-08-29

## 2018-08-30 NOTE — PRE ADMISSION SCREENING
Pre op instructions reviewed with patient per phone:  To confirm, Your surgeon has instructed you:  Surgery is scheduled 09/04/2018 at per Dr. Baker.      Please report to [facility] [address] 1st floor main lobby by Per Dr. Baker.   Pre admit office to call afternoon prior to surgery with final arrival time      INSTRUCTIONS IMPORTANT!!!  ¨ Do not eat, drink, or smoke after 12 midnight-including water. OK to brush teeth, no gum, candy or mints!    ¨ Take only these medicines with a small swallow of water-morning of surgery.  Lisinopril, toprol xl      ____  Do not wear makeup, including mascara.  ____  No powder, lotions or creams to surgical area.  ____  Please remove all jewelry, including piercings and leave at home.  ____  No money or valuables needed. Please leave at home.  ____  Please bring identification and insurance information to hospital.  ____  If going home the same day, arrange for a ride home. You will not be able to   drive if Anesthesia was used.  ____  Children, under 12 years old, must remain in the waiting room with an adult.  They are not allowed in patient areas.  ____  Wear loose fitting clothing. Allow for dressings, bandages.  ____  Stop Aspirin, Ibuprofen, Motrin and Aleve at least 5-7 days before surgery, unless otherwise instructed by your doctor, or the nurse.   You MAY use Tylenol/acetaminophen until day of surgery.  ____  If you take diabetic medication, do not take am of surgery unless instructed by   Doctor.  ____ Stop taking any Fish Oil supplement or any Vitamins that contain Vitamin E at least 5 days prior to surgery.          Bathing Instructions-- The night before surgery and the morning prior to coming to the hospital:   -Do not shave the surgical area.   -Shower and wash your hair and body as usual with anti-bacterial  soap and shampoo.   -Rinse your hair and body completely.   -Use one packet of hibiclens to wash the surgical site (using your hand)  gently for 5 minutes.  Do not scrub you skin too hard.   -Do not use hibiclens on your head, face, or genitals.   -Do not wash with anti-bacterial soap after you use the hibiclens.   -Rinse your body thoroughly.   -Dry with clean, soft towel.  Do not use lotion, cream, deodorant, or powders on   the surgical site.    Use antibacterial soap in place of hibiclens if your surgery is on the head, face or genitals.         Surgical Site Infection    Prevention of surgical site infections:     -Keep incisions clean and dry.   -Do not soak/submerge incisions in water until completely healed.   -Do not apply lotions, powders, creams, or deodorants to site.   -Always make sure hands are cleaned with antibacterial soap/ alcohol-based   prior to touching the surgical site.  (This includes doctors, nurses, staff, and yourself.)    Signs and symptoms:   -Redness and pain around the area where you had surgery   -Drainage of cloudy fluid from your surgical wound   -Fever over 100.4  I have read or had read and explained to me, and understand the above information.

## 2018-08-31 ENCOUNTER — TELEPHONE (OUTPATIENT)
Dept: UROLOGY | Facility: CLINIC | Age: 48
End: 2018-08-31

## 2018-09-04 ENCOUNTER — ANESTHESIA EVENT (OUTPATIENT)
Dept: SURGERY | Facility: HOSPITAL | Age: 48
DRG: 670 | End: 2018-09-04
Payer: COMMERCIAL

## 2018-09-04 ENCOUNTER — ANESTHESIA (OUTPATIENT)
Dept: SURGERY | Facility: HOSPITAL | Age: 48
DRG: 670 | End: 2018-09-04
Payer: COMMERCIAL

## 2018-09-04 ENCOUNTER — HOSPITAL ENCOUNTER (INPATIENT)
Facility: HOSPITAL | Age: 48
LOS: 1 days | Discharge: HOME | DRG: 670 | End: 2018-09-05
Attending: UROLOGY | Admitting: INTERNAL MEDICINE
Payer: COMMERCIAL

## 2018-09-04 DIAGNOSIS — I48.19 ATRIAL FIBRILLATION, PERSISTENT: Chronic | ICD-10-CM

## 2018-09-04 DIAGNOSIS — N20.1 URETERAL STONE: Primary | ICD-10-CM

## 2018-09-04 DIAGNOSIS — I10 ESSENTIAL HYPERTENSION: ICD-10-CM

## 2018-09-04 DIAGNOSIS — I48.91 ATRIAL FIBRILLATION WITH RAPID VENTRICULAR RESPONSE: ICD-10-CM

## 2018-09-04 DIAGNOSIS — N20.0 RENAL STONE: ICD-10-CM

## 2018-09-04 DIAGNOSIS — I48.91 ATRIAL FIBRILLATION: ICD-10-CM

## 2018-09-04 PROBLEM — G47.30 SLEEP APNEA: Status: ACTIVE | Noted: 2018-09-04

## 2018-09-04 LAB — POCT GLUCOSE: 99 MG/DL (ref 70–110)

## 2018-09-04 PROCEDURE — C2617 STENT, NON-COR, TEM W/O DEL: HCPCS | Performed by: UROLOGY

## 2018-09-04 PROCEDURE — 52356 CYSTO/URETERO W/LITHOTRIPSY: CPT | Mod: LT,,, | Performed by: UROLOGY

## 2018-09-04 PROCEDURE — 25000003 PHARM REV CODE 250: Performed by: UROLOGY

## 2018-09-04 PROCEDURE — C1769 GUIDE WIRE: HCPCS | Performed by: UROLOGY

## 2018-09-04 PROCEDURE — 27201423 OPTIME MED/SURG SUP & DEVICES STERILE SUPPLY: Performed by: UROLOGY

## 2018-09-04 PROCEDURE — 76000 FLUOROSCOPY <1 HR PHYS/QHP: CPT | Mod: 26,59,, | Performed by: UROLOGY

## 2018-09-04 PROCEDURE — 0TC78ZZ EXTIRPATION OF MATTER FROM LEFT URETER, VIA NATURAL OR ARTIFICIAL OPENING ENDOSCOPIC: ICD-10-PCS | Performed by: UROLOGY

## 2018-09-04 PROCEDURE — 0T778DZ DILATION OF LEFT URETER WITH INTRALUMINAL DEVICE, VIA NATURAL OR ARTIFICIAL OPENING ENDOSCOPIC: ICD-10-PCS | Performed by: UROLOGY

## 2018-09-04 PROCEDURE — 93005 ELECTROCARDIOGRAM TRACING: CPT

## 2018-09-04 PROCEDURE — G0378 HOSPITAL OBSERVATION PER HR: HCPCS

## 2018-09-04 PROCEDURE — 37000008 HC ANESTHESIA 1ST 15 MINUTES: Performed by: UROLOGY

## 2018-09-04 PROCEDURE — 36000707: Performed by: UROLOGY

## 2018-09-04 PROCEDURE — C1773 RET DEV, INSERTABLE: HCPCS | Performed by: UROLOGY

## 2018-09-04 PROCEDURE — 63600175 PHARM REV CODE 636 W HCPCS: Performed by: NURSE ANESTHETIST, CERTIFIED REGISTERED

## 2018-09-04 PROCEDURE — 25000003 PHARM REV CODE 250: Performed by: NURSE ANESTHETIST, CERTIFIED REGISTERED

## 2018-09-04 PROCEDURE — 37000009 HC ANESTHESIA EA ADD 15 MINS: Performed by: UROLOGY

## 2018-09-04 PROCEDURE — 82365 CALCULUS SPECTROSCOPY: CPT

## 2018-09-04 PROCEDURE — 36000706: Performed by: UROLOGY

## 2018-09-04 PROCEDURE — 71000033 HC RECOVERY, INTIAL HOUR: Performed by: UROLOGY

## 2018-09-04 PROCEDURE — 99243 OFF/OP CNSLTJ NEW/EST LOW 30: CPT | Mod: ,,, | Performed by: INTERNAL MEDICINE

## 2018-09-04 PROCEDURE — 93010 ELECTROCARDIOGRAM REPORT: CPT | Mod: ,,, | Performed by: INTERNAL MEDICINE

## 2018-09-04 PROCEDURE — 25000003 PHARM REV CODE 250: Performed by: PHYSICIAN ASSISTANT

## 2018-09-04 DEVICE — STENT URETERAL UNIV 6FR 26CM: Type: IMPLANTABLE DEVICE | Site: URETER | Status: FUNCTIONAL

## 2018-09-04 RX ORDER — SODIUM CHLORIDE, SODIUM LACTATE, POTASSIUM CHLORIDE, CALCIUM CHLORIDE 600; 310; 30; 20 MG/100ML; MG/100ML; MG/100ML; MG/100ML
INJECTION, SOLUTION INTRAVENOUS CONTINUOUS PRN
Status: DISCONTINUED | OUTPATIENT
Start: 2018-09-04 | End: 2018-09-04

## 2018-09-04 RX ORDER — HYDROCODONE BITARTRATE AND ACETAMINOPHEN 5; 325 MG/1; MG/1
1 TABLET ORAL EVERY 4 HOURS PRN
Status: DISCONTINUED | OUTPATIENT
Start: 2018-09-04 | End: 2018-09-05 | Stop reason: HOSPADM

## 2018-09-04 RX ORDER — ESMOLOL HYDROCHLORIDE 10 MG/ML
INJECTION INTRAVENOUS
Status: DISCONTINUED | OUTPATIENT
Start: 2018-09-04 | End: 2018-09-04 | Stop reason: HOSPADM

## 2018-09-04 RX ORDER — METOPROLOL TARTRATE 1 MG/ML
INJECTION, SOLUTION INTRAVENOUS
Status: DISCONTINUED | OUTPATIENT
Start: 2018-09-04 | End: 2018-09-04 | Stop reason: HOSPADM

## 2018-09-04 RX ORDER — ROCURONIUM BROMIDE 10 MG/ML
INJECTION, SOLUTION INTRAVENOUS
Status: DISCONTINUED | OUTPATIENT
Start: 2018-09-04 | End: 2018-09-04

## 2018-09-04 RX ORDER — METOPROLOL SUCCINATE 50 MG/1
100 TABLET, EXTENDED RELEASE ORAL 2 TIMES DAILY
Status: DISCONTINUED | OUTPATIENT
Start: 2018-09-04 | End: 2018-09-05 | Stop reason: HOSPADM

## 2018-09-04 RX ORDER — SODIUM CHLORIDE, SODIUM LACTATE, POTASSIUM CHLORIDE, CALCIUM CHLORIDE 600; 310; 30; 20 MG/100ML; MG/100ML; MG/100ML; MG/100ML
INJECTION, SOLUTION INTRAVENOUS CONTINUOUS PRN
Status: DISCONTINUED | OUTPATIENT
Start: 2018-09-04 | End: 2018-09-04 | Stop reason: HOSPADM

## 2018-09-04 RX ORDER — FUROSEMIDE 10 MG/ML
INJECTION INTRAMUSCULAR; INTRAVENOUS
Status: DISCONTINUED | OUTPATIENT
Start: 2018-09-04 | End: 2018-09-04

## 2018-09-04 RX ORDER — RAMELTEON 8 MG/1
8 TABLET ORAL NIGHTLY PRN
Status: DISCONTINUED | OUTPATIENT
Start: 2018-09-04 | End: 2018-09-05 | Stop reason: HOSPADM

## 2018-09-04 RX ORDER — DILTIAZEM HYDROCHLORIDE 5 MG/ML
20 INJECTION INTRAVENOUS ONCE
Status: DISCONTINUED | OUTPATIENT
Start: 2018-09-04 | End: 2018-09-04

## 2018-09-04 RX ORDER — CEFAZOLIN SODIUM 1 G/3ML
INJECTION, POWDER, FOR SOLUTION INTRAMUSCULAR; INTRAVENOUS
Status: DISCONTINUED | OUTPATIENT
Start: 2018-09-04 | End: 2018-09-04

## 2018-09-04 RX ORDER — PROPOFOL 10 MG/ML
VIAL (ML) INTRAVENOUS
Status: DISCONTINUED | OUTPATIENT
Start: 2018-09-04 | End: 2018-09-04

## 2018-09-04 RX ORDER — MIDAZOLAM HYDROCHLORIDE 1 MG/ML
INJECTION INTRAMUSCULAR; INTRAVENOUS
Status: DISCONTINUED | OUTPATIENT
Start: 2018-09-04 | End: 2018-09-04

## 2018-09-04 RX ORDER — LISINOPRIL 20 MG/1
20 TABLET ORAL DAILY
Status: DISCONTINUED | OUTPATIENT
Start: 2018-09-05 | End: 2018-09-05 | Stop reason: HOSPADM

## 2018-09-04 RX ORDER — GLYCOPYRROLATE 0.2 MG/ML
INJECTION INTRAMUSCULAR; INTRAVENOUS
Status: DISCONTINUED | OUTPATIENT
Start: 2018-09-04 | End: 2018-09-04

## 2018-09-04 RX ORDER — LIDOCAINE HCL/PF 100 MG/5ML
SYRINGE (ML) INTRAVENOUS
Status: DISCONTINUED | OUTPATIENT
Start: 2018-09-04 | End: 2018-09-04

## 2018-09-04 RX ORDER — ONDANSETRON 2 MG/ML
INJECTION INTRAMUSCULAR; INTRAVENOUS
Status: DISCONTINUED | OUTPATIENT
Start: 2018-09-04 | End: 2018-09-04

## 2018-09-04 RX ORDER — CEFAZOLIN SODIUM 2 G/50ML
2 SOLUTION INTRAVENOUS
Status: DISCONTINUED | OUTPATIENT
Start: 2018-09-04 | End: 2018-09-04

## 2018-09-04 RX ORDER — NEOSTIGMINE METHYLSULFATE 1 MG/ML
INJECTION, SOLUTION INTRAVENOUS
Status: DISCONTINUED | OUTPATIENT
Start: 2018-09-04 | End: 2018-09-04

## 2018-09-04 RX ORDER — DILTIAZEM HCL/D5W 125 MG/125
5 PLASTIC BAG, INJECTION (ML) INTRAVENOUS CONTINUOUS
Status: DISCONTINUED | OUTPATIENT
Start: 2018-09-04 | End: 2018-09-05

## 2018-09-04 RX ORDER — DILTIAZEM HYDROCHLORIDE 5 MG/ML
20 INJECTION INTRAVENOUS ONCE
Status: COMPLETED | OUTPATIENT
Start: 2018-09-04 | End: 2018-09-04

## 2018-09-04 RX ORDER — SUCCINYLCHOLINE CHLORIDE 20 MG/ML
INJECTION INTRAMUSCULAR; INTRAVENOUS
Status: DISCONTINUED | OUTPATIENT
Start: 2018-09-04 | End: 2018-09-04

## 2018-09-04 RX ORDER — ACETAMINOPHEN 325 MG/1
650 TABLET ORAL EVERY 8 HOURS PRN
Status: DISCONTINUED | OUTPATIENT
Start: 2018-09-04 | End: 2018-09-05 | Stop reason: HOSPADM

## 2018-09-04 RX ORDER — SODIUM CHLORIDE 0.9 % (FLUSH) 0.9 %
3 SYRINGE (ML) INJECTION
Status: DISCONTINUED | OUTPATIENT
Start: 2018-09-04 | End: 2018-09-05 | Stop reason: HOSPADM

## 2018-09-04 RX ORDER — DIPHENHYDRAMINE HYDROCHLORIDE 50 MG/ML
25 INJECTION INTRAMUSCULAR; INTRAVENOUS EVERY 4 HOURS PRN
Status: DISCONTINUED | OUTPATIENT
Start: 2018-09-04 | End: 2018-09-05 | Stop reason: HOSPADM

## 2018-09-04 RX ORDER — MIDAZOLAM HYDROCHLORIDE 1 MG/ML
INJECTION, SOLUTION INTRAMUSCULAR; INTRAVENOUS
Status: DISCONTINUED | OUTPATIENT
Start: 2018-09-04 | End: 2018-09-04 | Stop reason: HOSPADM

## 2018-09-04 RX ORDER — ONDANSETRON 8 MG/1
8 TABLET, ORALLY DISINTEGRATING ORAL EVERY 8 HOURS PRN
Status: DISCONTINUED | OUTPATIENT
Start: 2018-09-04 | End: 2018-09-05 | Stop reason: HOSPADM

## 2018-09-04 RX ORDER — FENTANYL CITRATE 50 UG/ML
INJECTION, SOLUTION INTRAMUSCULAR; INTRAVENOUS
Status: DISCONTINUED | OUTPATIENT
Start: 2018-09-04 | End: 2018-09-04

## 2018-09-04 RX ADMIN — DILTIAZEM HYDROCHLORIDE 5 MG/HR: 5 INJECTION INTRAVENOUS at 04:09

## 2018-09-04 RX ADMIN — DILTIAZEM HYDROCHLORIDE 20 MG: 5 INJECTION INTRAVENOUS at 04:09

## 2018-09-04 RX ADMIN — FUROSEMIDE 10 MG: 10 INJECTION, SOLUTION INTRAMUSCULAR; INTRAVENOUS at 07:09

## 2018-09-04 RX ADMIN — METOPROLOL SUCCINATE 100 MG: 50 TABLET, EXTENDED RELEASE ORAL at 08:09

## 2018-09-04 RX ADMIN — ROBINUL 0.8 MG: 0.2 INJECTION INTRAMUSCULAR; INTRAVENOUS at 08:09

## 2018-09-04 RX ADMIN — CEFAZOLIN 2 G: 1 INJECTION, POWDER, FOR SOLUTION INTRAMUSCULAR; INTRAVENOUS at 07:09

## 2018-09-04 RX ADMIN — ESMOLOL HYDROCHLORIDE 10 MG: 10 INJECTION, SOLUTION INTRAVENOUS at 02:09

## 2018-09-04 RX ADMIN — FENTANYL CITRATE 100 MCG: 50 INJECTION, SOLUTION INTRAMUSCULAR; INTRAVENOUS at 07:09

## 2018-09-04 RX ADMIN — PROPOFOL 150 MG: 10 INJECTION, EMULSION INTRAVENOUS at 07:09

## 2018-09-04 RX ADMIN — SUCCINYLCHOLINE CHLORIDE 160 MG: 20 INJECTION, SOLUTION INTRAMUSCULAR; INTRAVENOUS at 07:09

## 2018-09-04 RX ADMIN — METOPROLOL TARTRATE 2.5 MG: 1 INJECTION, SOLUTION INTRAVENOUS at 02:09

## 2018-09-04 RX ADMIN — MIDAZOLAM HYDROCHLORIDE 2 MG: 1 INJECTION, SOLUTION INTRAMUSCULAR; INTRAVENOUS at 07:09

## 2018-09-04 RX ADMIN — SODIUM CHLORIDE, SODIUM LACTATE, POTASSIUM CHLORIDE, AND CALCIUM CHLORIDE: 600; 310; 30; 20 INJECTION, SOLUTION INTRAVENOUS at 02:09

## 2018-09-04 RX ADMIN — SODIUM CHLORIDE, SODIUM LACTATE, POTASSIUM CHLORIDE, AND CALCIUM CHLORIDE: 600; 310; 30; 20 INJECTION, SOLUTION INTRAVENOUS at 07:09

## 2018-09-04 RX ADMIN — ONDANSETRON 4 MG: 2 INJECTION, SOLUTION INTRAMUSCULAR; INTRAVENOUS at 08:09

## 2018-09-04 RX ADMIN — MIDAZOLAM 2 MG: 1 INJECTION INTRAMUSCULAR; INTRAVENOUS at 02:09

## 2018-09-04 RX ADMIN — HYDROCODONE BITARTRATE AND ACETAMINOPHEN 1 TABLET: 5; 325 TABLET ORAL at 10:09

## 2018-09-04 RX ADMIN — NEOSTIGMINE METHYLSULFATE 5 MG: 1 INJECTION INTRAVENOUS at 08:09

## 2018-09-04 RX ADMIN — LIDOCAINE HYDROCHLORIDE 100 MG: 20 INJECTION, SOLUTION INTRAVENOUS at 07:09

## 2018-09-04 RX ADMIN — ESMOLOL HYDROCHLORIDE 15 MG: 10 INJECTION, SOLUTION INTRAVENOUS at 02:09

## 2018-09-04 RX ADMIN — ROCURONIUM BROMIDE 5 MG: 10 INJECTION, SOLUTION INTRAVENOUS at 07:09

## 2018-09-04 NOTE — ANESTHESIA PREPROCEDURE EVALUATION
09/04/2018  Renzo Salgado is a 48 y.o., male.    Pre-op Assessment    I have reviewed the Patient Summary Reports.     I have reviewed the Nursing Notes.      Review of Systems  Anesthesia Hx:  No problems with previous Anesthesia Pt is noted to have gone into afibb with RVR (130-180s) with a prior urological procedure per Dr Baker.  This procedure was at the [facility].   Denies Family Hx of Anesthesia complications.   Denies Personal Hx of Anesthesia complications.   Social:  Non-Smoker, No Alcohol Use    Hematology/Oncology:  Hematology Normal   Oncology Normal     EENT/Dental:EENT/Dental Normal   Cardiovascular:   Hypertension Dysrhythmias atrial fibrillation CONCLUSIONS     1 - Concentric remodeling.     2 - Normal left ventricular systolic function (EF 55-60%).     3 - Normal left ventricular diastolic function.     4 - Normal right ventricular systolic function .     5 - The estimated PA systolic pressure is 31 mmHg.     6 - Mild tricuspid regurgitation.   Pulmonary:   Sleep Apnea    Renal/:   Chronic Renal Disease renal calculi    Musculoskeletal:  Musculoskeletal Normal    Neurological:  Neurology Normal    Endocrine:  Endocrine Normal    Dermatological:  Skin Normal    Psych:  Psychiatric Normal           Physical Exam  General:  Obesity       Chest/Lungs:  Chest/Lungs Findings: Normal Respiratory Rate, Clear to auscultation     Heart/Vascular:  Heart Findings: Rate: Normal  Rhythm: Regular Rhythm        Mental Status:  Mental Status Findings:  Alert and Oriented, Cooperative         Anesthesia Plan  Type of Anesthesia, risks & benefits discussed:  Anesthesia Type:  general  Patient's Preference:   Intra-op Monitoring Plan: standard ASA monitors  Intra-op Monitoring Plan Comments:   Post Op Pain Control Plan: IV/PO Opioids PRN  Post Op Pain Control Plan Comments:   Induction:    IV  Beta Blocker:  Patient is on a Beta-Blocker and has received one dose within the past 24 hours (No further documentation required).       Informed Consent: Patient understands risks and agrees with Anesthesia plan.  Questions answered. Anesthesia consent signed with patient.  ASA Score: 3     Day of Surgery Review of History & Physical: I have interviewed and examined the patient. I have reviewed the patient's H&P dated:            Ready For Surgery From Anesthesia Perspective.

## 2018-09-04 NOTE — SUBJECTIVE & OBJECTIVE
Past Medical History:   Diagnosis Date    Atrial fibrillation     Hypertension     Sleep apnea        Past Surgical History:   Procedure Laterality Date    UPPER GASTROINTESTINAL ENDOSCOPY         Review of patient's allergies indicates:   Allergen Reactions    Shellfish containing products Hives       No current facility-administered medications on file prior to encounter.      Current Outpatient Medications on File Prior to Encounter   Medication Sig    apixaban (ELIQUIS) 5 mg Tab Take 5 mg by mouth 2 (two) times daily.    lisinopril (PRINIVIL,ZESTRIL) 20 MG tablet Take 1 tablet (20 mg total) by mouth once daily.    metoprolol succinate (TOPROL-XL) 50 MG 24 hr tablet TK 1 T PO ONCE A DAY (Patient taking differently: Take 100 mg by mouth 2 (two) times daily. TK 1 T PO ONCE A DAY)    dronedarone (MULTAQ) 400 mg Tab Take 1 tablet (400 mg total) by mouth 2 (two) times daily with meals.     Family History     Problem Relation (Age of Onset)    Hypertension Mother        Tobacco Use    Smoking status: Never Smoker    Smokeless tobacco: Never Used   Substance and Sexual Activity    Alcohol use: No    Drug use: No    Sexual activity: Not on file     Review of Systems   Constitutional: Negative for activity change, appetite change, fatigue and fever.   HENT: Negative for congestion, postnasal drip, sore throat and trouble swallowing.    Eyes: Negative for redness, itching and visual disturbance.   Respiratory: Negative for cough, shortness of breath and wheezing.    Cardiovascular: Positive for palpitations. Negative for chest pain and leg swelling.   Gastrointestinal: Negative for abdominal distention, abdominal pain, diarrhea, nausea and vomiting.   Endocrine: Negative for cold intolerance and heat intolerance.   Genitourinary: Negative for difficulty urinating, dysuria, frequency and urgency.   Musculoskeletal: Negative for arthralgias, back pain and neck pain.   Skin: Negative for color change and wound.    Allergic/Immunologic: Negative for environmental allergies and food allergies.   Neurological: Negative for dizziness, syncope, weakness and headaches.   Hematological: Does not bruise/bleed easily.   Psychiatric/Behavioral: Negative for agitation, confusion and sleep disturbance. The patient is not nervous/anxious.      Objective:     Vital Signs (Most Recent):  Temp: 98.2 °F (36.8 °C) (09/04/18 1205)  Pulse: (!) 130 (09/04/18 1615)  Resp: (!) 24 (09/04/18 1615)  BP: 114/67 (09/04/18 1615)  SpO2: 96 % (09/04/18 1615) Vital Signs (24h Range):  Temp:  [98.2 °F (36.8 °C)] 98.2 °F (36.8 °C)  Pulse:  [] 130  Resp:  [14-24] 24  SpO2:  [96 %-99 %] 96 %  BP: (106-128)/(56-78) 114/67     Weight: 79.4 kg (175 lb 0.7 oz)  Body mass index is 27.42 kg/m².    Physical Exam   Constitutional: He is oriented to person, place, and time. He appears well-developed and well-nourished.   HENT:   Head: Normocephalic.   Nose: Nose normal.   Mouth/Throat: Oropharynx is clear and moist.   Eyes: Conjunctivae are normal.   Neck: Normal range of motion. Neck supple.   Cardiovascular: Intact distal pulses. An irregularly irregular rhythm present.   Pulmonary/Chest: Effort normal and breath sounds normal. He has no wheezes. He has no rales.   Abdominal: Soft. Bowel sounds are normal. He exhibits no distension. There is no tenderness.   Genitourinary:   Genitourinary Comments: Deferred   Musculoskeletal: Normal range of motion.   Neurological: He is alert and oriented to person, place, and time. No cranial nerve deficit.   Skin: Skin is warm and dry. Capillary refill takes less than 2 seconds.   Psychiatric: He has a normal mood and affect. His behavior is normal. Thought content normal.           Significant Labs: CBC: No results for input(s): WBC, HGB, HCT, PLT in the last 48 hours.  CMP: No results for input(s): NA, K, CL, CO2, GLU, BUN, CREATININE, CALCIUM, PROT, ALBUMIN, BILITOT, ALKPHOS, AST, ALT, ANIONGAP, EGFRNONAA in the last 48  hours.    Invalid input(s): ESTGFAFRICA  Troponin: No results for input(s): TROPONINI in the last 48 hours.    Significant Imaging:

## 2018-09-04 NOTE — ASSESSMENT & PLAN NOTE
- Cardiology consulted   - Cardiac monitoring   - Cardizem bolus  - Cardizem gtt  - Resume Eliquis when ok with Urology

## 2018-09-04 NOTE — ASSESSMENT & PLAN NOTE
-Mgmt as per urology  -Scheduled for ureteroscopy today  -No cardiac contraindication to proceeding

## 2018-09-04 NOTE — HPI
Renzo Salgado is a 47 yo male with a PMHx of Atrial fibrillation and HTN who presented to Pre for ureteroscopy per  (Urology).  Atrial fibrillation noted to monitor with 's. Pt reports palpitations and denies any other associated symptoms.  Pt reports compliance with prescribed medications with Lopressor taken this morning.  Pt states he takes Eliquis but has been off medication for approximately  1 month due to pending procedure.  Pt denies diagnosis of sleep apnea.  Current procedure has been cancelled at [facility] twice due to reccurrence of paroxysmal Afib.  Procedure cancelled at this time.  Pt denies smoking and use of ETOH.   Hospital Medicine consulted for Observation admit with Cardiology consulted and Cardizem infusion initiated.

## 2018-09-04 NOTE — TRANSFER OF CARE
"Anesthesia Transfer of Care Note    Patient: Renzo Salgado    Procedure(s) Performed: Procedure(s) (LRB):  URETEROSCOPY (Left)    Patient location: PACU    Anesthesia Type: MAC    Transport from OR: Transported from OR on room air with adequate spontaneous ventilation    Post pain: adequate analgesia    Post assessment: no apparent anesthetic complications    Post vital signs: unstable    Level of consciousness: awake, alert and oriented    Nausea/Vomiting: no nausea/vomiting    Complications: none    Transfer of care protocol was followed      Last vitals:   Visit Vitals  /77 (BP Location: Right arm, Patient Position: Sitting)   Pulse 70   Temp 36.8 °C (98.2 °F) (Skin)   Resp 16   Ht 5' 7" (1.702 m)   Wt 79.4 kg (175 lb 0.7 oz)   SpO2 99%   BMI 27.42 kg/m²     "

## 2018-09-04 NOTE — ASSESSMENT & PLAN NOTE
-Patient with chronic, persistent afib  -HR rapid this afternoon  -Will given on time dose of IV cardizem 20 mg and start drip at 5 mg/hr for HR control

## 2018-09-04 NOTE — CONSULTS
[facility] -   Cardiology  Consult Note    Patient Name: Renzo Salgado  MRN: [id number]  Admission Date: 9/4/2018  Hospital Length of Stay: 0 days  Code Status: No Order   Attending Provider: Ricky Baker IV, MD   Consulting Provider: Noris Sanchez PA-C  Primary Care Physician: Fadi Damico MD  Principal Problem:Ureteral stone    Patient information was obtained from patient, past medical records and ER records.     Inpatient consult to Cardiology  Consult performed by: Noris Sanchez PA-C  Consult ordered by: Ricky Baker IV, MD        Subjective:     Chief Complaint:  Afib/pre-op     HPI:   Mr. Salgado is a 48 year old male patient with a PMHx of persistent atrial fibrillation (on Eliquis) and HTN who presented to [facility] for elective ureteroscopy due to ureteral stone. Pre-operatively, patient with afib with RVR with HR in 130's. Cardiology consulted to assist with management. Patient seen and examined today. Feels well. No complaints. Denies any chest pain or SOB. No lightheadedness, dizziness, near syncope, or syncope. Followed on OP basis by Dr. Parker and was given clearance for procedure. Compliant with medications, no longer on Multaq.    HR variable at time of exam ranging from 90's-130's.     Past Medical History:   Diagnosis Date    Atrial fibrillation     Hypertension     Sleep apnea        Past Surgical History:   Procedure Laterality Date    UPPER GASTROINTESTINAL ENDOSCOPY         Review of patient's allergies indicates:   Allergen Reactions    Shellfish containing products Hives       No current facility-administered medications on file prior to encounter.      Current Outpatient Medications on File Prior to Encounter   Medication Sig    apixaban (ELIQUIS) 5 mg Tab Take 5 mg by mouth 2 (two) times daily.    lisinopril (PRINIVIL,ZESTRIL) 20 MG tablet Take 1 tablet (20 mg total) by mouth once daily.    metoprolol succinate (TOPROL-XL) 50 MG 24 hr tablet TK  1 T PO ONCE A DAY (Patient taking differently: Take 100 mg by mouth 2 (two) times daily. TK 1 T PO ONCE A DAY)    dronedarone (MULTAQ) 400 mg Tab Take 1 tablet (400 mg total) by mouth 2 (two) times daily with meals.     Family History     Problem Relation (Age of Onset)    Hypertension Mother        Tobacco Use    Smoking status: Never Smoker    Smokeless tobacco: Never Used   Substance and Sexual Activity    Alcohol use: No    Drug use: No    Sexual activity: Not on file     Review of Systems   Constitution: Negative.   HENT: Negative.    Eyes: Negative.    Cardiovascular: Negative.    Respiratory: Negative.    Endocrine: Negative.    Hematologic/Lymphatic: Negative.    Skin: Negative.    Musculoskeletal: Negative.    Gastrointestinal: Negative.    Genitourinary: Negative.    Neurological: Negative.    Psychiatric/Behavioral: Negative.    Allergic/Immunologic: Negative.      Objective:     Vital Signs (Most Recent):  Temp: 98.2 °F (36.8 °C) (09/04/18 1205)  Pulse: 108 (09/04/18 1510)  Resp: 19 (09/04/18 1510)  BP: 128/71 (09/04/18 1510)  SpO2: 98 % (09/04/18 1510) Vital Signs (24h Range):  Temp:  [98.2 °F (36.8 °C)] 98.2 °F (36.8 °C)  Pulse:  [] 108  Resp:  [15-24] 19  SpO2:  [97 %-99 %] 98 %  BP: (119-128)/(71-78) 128/71     Weight: 79.4 kg (175 lb 0.7 oz)  Body mass index is 27.42 kg/m².    SpO2: 98 %  O2 Device (Oxygen Therapy): room air    No intake or output data in the 24 hours ending 09/04/18 1513    Lines/Drains/Airways     Peripheral Intravenous Line                 Peripheral IV - Single Lumen 09/04/18 1217 Left Antecubital less than 1 day                Physical Exam   Constitutional: He is oriented to person, place, and time. He appears well-developed and well-nourished. No distress.   HENT:   Head: Normocephalic and atraumatic.   Eyes: Pupils are equal, round, and reactive to light. Right eye exhibits no discharge. Left eye exhibits no discharge.   Neck: Neck supple. No JVD present. No  thyromegaly present.   Cardiovascular: S1 normal, S2 normal, normal heart sounds and intact distal pulses. An irregularly irregular rhythm present. Tachycardia present.   No murmur heard.  Pulmonary/Chest: Effort normal and breath sounds normal. No respiratory distress. He has no wheezes. He has no rales.   Abdominal: Soft. He exhibits no distension. There is no tenderness. There is no rebound.   Musculoskeletal: He exhibits no edema.   Neurological: He is alert and oriented to person, place, and time.   Skin: Skin is warm and dry. He is not diaphoretic. No erythema.   Psychiatric: He has a normal mood and affect. His behavior is normal. Thought content normal.   Nursing note and vitals reviewed.      Significant Labs: CMP No results for input(s): NA, K, CL, CO2, GLU, BUN, CREATININE, CALCIUM, PROT, ALBUMIN, BILITOT, ALKPHOS, AST, ALT, ANIONGAP, ESTGFRAFRICA, EGFRNONAA in the last 48 hours., CBC No results for input(s): WBC, HGB, HCT, PLT in the last 48 hours., Troponin No results for input(s): TROPONINI in the last 48 hours. and All pertinent lab results from the last 24 hours have been reviewed.    Significant Imaging: Echocardiogram:   2D echo with color flow doppler:   Results for orders placed or performed in visit on 04/01/16   2D echo with color flow doppler   Result Value Ref Range    EF 55 55 - 65    Diastolic Dysfunction No     Est. PA Systolic Pressure 30.67     Tricuspid Valve Regurgitation MILD    , EKG: Reviewed and X-Ray: CXR: X-Ray Chest 1 View (CXR): No results found for this visit on 09/04/18. and X-Ray Chest PA and Lateral (CXR): No results found for this visit on 09/04/18.    Assessment and Plan:   Patient with ureteral stone who presented for ureteroscopy today. History of persistent afib, will add cardizem for rate control. Ok to proceed once HR controlled per MD.     * Ureteral stone    -Mgmt as per urology  -Scheduled for ureteroscopy today  -No cardiac contraindication to proceeding         Atrial fibrillation    -Patient with chronic, persistent afib  -HR rapid this afternoon  -Will given on time dose of IV cardizem 20 mg and start drip at 5 mg/hr for HR control            VTE Risk Mitigation (From admission, onward)        Ordered     Place sequential compression device  Until discontinued      09/04/18 6114          Thank you for your consult. I will follow-up with patient. Please contact us if you have any additional questions.    Noris Sanchez PA-C  Cardiology   [facility]    Chart reviewed. Dr. Culp examined patient and agrees with plan as outlined above.

## 2018-09-04 NOTE — TREATMENT PLAN
Dr. Moise notified of EKG results about 45 minutes after diltiazem was started. Rate is 74, still in Afib

## 2018-09-04 NOTE — ANESTHESIA PREPROCEDURE EVALUATION
09/04/2018  Renzo Salgado is a 48 y.o., male.    Anesthesia Evaluation    I have reviewed the Patient Summary Reports.    I have reviewed the Nursing Notes.      Review of Systems  Anesthesia Hx:  No problems with previous Anesthesia Pt is noted to have gone into afibb with RVR (130-180s) with a prior urological procedure per Dr Baker.  This procedure was at the [facility].   Denies Family Hx of Anesthesia complications.   Denies Personal Hx of Anesthesia complications.   Social:  Non-Smoker, No Alcohol Use    Hematology/Oncology:  Hematology Normal   Oncology Normal     EENT/Dental:EENT/Dental Normal   Cardiovascular:   Hypertension Dysrhythmias atrial fibrillation CONCLUSIONS     1 - Concentric remodeling.     2 - Normal left ventricular systolic function (EF 55-60%).     3 - Normal left ventricular diastolic function.     4 - Normal right ventricular systolic function .     5 - The estimated PA systolic pressure is 31 mmHg.     6 - Mild tricuspid regurgitation.   Pulmonary:   Sleep Apnea    Renal/:   Chronic Renal Disease renal calculi    Musculoskeletal:  Musculoskeletal Normal    Neurological:  Neurology Normal    Endocrine:  Endocrine Normal    Dermatological:  Skin Normal    Psych:  Psychiatric Normal           Physical Exam  General:  Obesity       Chest/Lungs:  Chest/Lungs Findings: Normal Respiratory Rate, Clear to auscultation     Heart/Vascular:  Heart Findings: Rate: Normal  Rhythm: Regular Rhythm        Mental Status:  Mental Status Findings:  Alert and Oriented, Cooperative         Anesthesia Plan  Type of Anesthesia, risks & benefits discussed:  Anesthesia Type:  general  Patient's Preference:   Intra-op Monitoring Plan: standard ASA monitors  Intra-op Monitoring Plan Comments:   Post Op Pain Control Plan: IV/PO Opioids PRN  Post Op Pain Control Plan Comments:   Induction:    IV  Beta Blocker:  Patient is on a Beta-Blocker and has received one dose within the past 24 hours (No further documentation required).       Informed Consent: Patient understands risks and agrees with Anesthesia plan.  Questions answered. Anesthesia consent signed with patient.  ASA Score: 3     Day of Surgery Review of History & Physical: I have interviewed and examined the patient. I have reviewed the patient's H&P dated:            Ready For Surgery From Anesthesia Perspective.

## 2018-09-04 NOTE — INTERVAL H&P NOTE
The patient has been examined and the H&P has been reviewed:    I concur with the findings and changes have been noted since the H&P was written: stone migrated to distal left ureter; case changed to left ureteroscopy with laser lithotripsy.    Anesthesia/Surgery risks, benefits and alternative options discussed and understood by patient/family.          There are no hospital problems to display for this patient.

## 2018-09-04 NOTE — HPI
Mr. Salgado is a 48 year old male patient with a PMHx of persistent atrial fibrillation (on Eliquis) and HTN who presented to [facility] for elective ureteroscopy due to ureteral stone. Pre-operatively, patient with afib with RVR with HR in 130's. Cardiology consulted to assist with management. Patient seen and examined today. Feels well. No complaints. Denies any chest pain or SOB. No lightheadedness, dizziness, near syncope, or syncope. Followed on OP basis by Dr. Parker and was given clearance for procedure. Compliant with medications.    HR variable at time of exam ranging from 90's-130's.

## 2018-09-04 NOTE — H&P
[facility] Hospital Medicine  History & Physical    Patient Name: Renzo Salgado  MRN: [id number]  Admission Date: 9/4/2018  Attending Physician: Ricky Baker IV, MD   Primary Care Provider: Fadi Damico MD         Patient information was obtained from patient and ER records.     Subjective:     Principal Problem:Atrial fibrillation    Chief Complaint: No chief complaint on file.       HPI: Renzo Salgado is a 49 yo male with a PMHx of Atrial fibrillation and HTN who presented to Preop for ureteroscopy per  (Urology).  Atrial fibrillation noted to monitor with 's. Pt reports palpitations and denies any other associated symptoms.  Pt reports compliance with prescribed medications with Lopressor taken this morning.  Pt states he takes Eliquis but has been off medication for approximately  1 month due to pending procedure.  Pt denies diagnosis of sleep apnea.  Current procedure has been cancelled at [facility] twice due to reccurrence of paroxysmal Afib.  Procedure cancelled at this time.  Pt denies smoking and use of ETOH.   Hospital Medicine consulted for Observation admit with Cardiology consulted and Cardizem infusion initiated.      Past Medical History:   Diagnosis Date    Atrial fibrillation     Hypertension     Sleep apnea        Past Surgical History:   Procedure Laterality Date    UPPER GASTROINTESTINAL ENDOSCOPY         Review of patient's allergies indicates:   Allergen Reactions    Shellfish containing products Hives       No current facility-administered medications on file prior to encounter.      Current Outpatient Medications on File Prior to Encounter   Medication Sig    apixaban (ELIQUIS) 5 mg Tab Take 5 mg by mouth 2 (two) times daily.    lisinopril (PRINIVIL,ZESTRIL) 20 MG tablet Take 1 tablet (20 mg total) by mouth once daily.    metoprolol succinate (TOPROL-XL) 50 MG 24 hr tablet TK 1 T PO ONCE A DAY (Patient taking differently: Take 100 mg by  mouth 2 (two) times daily. TK 1 T PO ONCE A DAY)    dronedarone (MULTAQ) 400 mg Tab Take 1 tablet (400 mg total) by mouth 2 (two) times daily with meals.     Family History     Problem Relation (Age of Onset)    Hypertension Mother        Tobacco Use    Smoking status: Never Smoker    Smokeless tobacco: Never Used   Substance and Sexual Activity    Alcohol use: No    Drug use: No    Sexual activity: Not on file     Review of Systems   Constitutional: Negative for activity change, appetite change, fatigue and fever.   HENT: Negative for congestion, postnasal drip, sore throat and trouble swallowing.    Eyes: Negative for redness, itching and visual disturbance.   Respiratory: Negative for cough, shortness of breath and wheezing.    Cardiovascular: Positive for palpitations. Negative for chest pain and leg swelling.   Gastrointestinal: Negative for abdominal distention, abdominal pain, diarrhea, nausea and vomiting.   Endocrine: Negative for cold intolerance and heat intolerance.   Genitourinary: Negative for difficulty urinating, dysuria, frequency and urgency.   Musculoskeletal: Negative for arthralgias, back pain and neck pain.   Skin: Negative for color change and wound.   Allergic/Immunologic: Negative for environmental allergies and food allergies.   Neurological: Negative for dizziness, syncope, weakness and headaches.   Hematological: Does not bruise/bleed easily.   Psychiatric/Behavioral: Negative for agitation, confusion and sleep disturbance. The patient is not nervous/anxious.      Objective:     Vital Signs (Most Recent):  Temp: 98.2 °F (36.8 °C) (09/04/18 1205)  Pulse: (!) 130 (09/04/18 1615)  Resp: (!) 24 (09/04/18 1615)  BP: 114/67 (09/04/18 1615)  SpO2: 96 % (09/04/18 1615) Vital Signs (24h Range):  Temp:  [98.2 °F (36.8 °C)] 98.2 °F (36.8 °C)  Pulse:  [] 130  Resp:  [14-24] 24  SpO2:  [96 %-99 %] 96 %  BP: (106-128)/(56-78) 114/67     Weight: 79.4 kg (175 lb 0.7 oz)  Body mass index is  27.42 kg/m².    Physical Exam   Constitutional: He is oriented to person, place, and time. He appears well-developed and well-nourished.   HENT:   Head: Normocephalic.   Nose: Nose normal.   Mouth/Throat: Oropharynx is clear and moist.   Eyes: Conjunctivae are normal.   Neck: Normal range of motion. Neck supple.   Cardiovascular: Intact distal pulses. An irregularly irregular rhythm present.   Pulmonary/Chest: Effort normal and breath sounds normal. He has no wheezes. He has no rales.   Abdominal: Soft. Bowel sounds are normal. He exhibits no distension. There is no tenderness.   Genitourinary:   Genitourinary Comments: Deferred   Musculoskeletal: Normal range of motion.   Neurological: He is alert and oriented to person, place, and time. No cranial nerve deficit.   Skin: Skin is warm and dry. Capillary refill takes less than 2 seconds.   Psychiatric: He has a normal mood and affect. His behavior is normal. Thought content normal.           Significant Labs: CBC: No results for input(s): WBC, HGB, HCT, PLT in the last 48 hours.  CMP: No results for input(s): NA, K, CL, CO2, GLU, BUN, CREATININE, CALCIUM, PROT, ALBUMIN, BILITOT, ALKPHOS, AST, ALT, ANIONGAP, EGFRNONAA in the last 48 hours.    Invalid input(s): ESTGFAFRICA  Troponin: No results for input(s): TROPONINI in the last 48 hours.    Significant Imaging:         Assessment/Plan:     * Atrial fibrillation    - Cardiology consulted   - Cardiac monitoring   - Cardizem bolus  - Cardizem gtt  - Resume Eliquis when ok with Urology           Ureteral stone    - Follow up with Dr. Baker (Urology)          Renal stone    - Outpatient follow up with Dr. Baker (Urology)          Essential hypertension    - Monitor VS   - resume Metoprolol/Lisinopril             VTE Risk Mitigation (From admission, onward)        Ordered     Place sequential compression device  Until discontinued      09/04/18 1204             Kaleb Santo NP  Department of Hospital Medicine    [facility]

## 2018-09-04 NOTE — PROGRESS NOTES
Surgery cancelled as pt could not undergo anesthesia due to arrythmias.  Admitted on observation to Hospital Medicine, with cardiology consult.  Will add to schedule for left ureteral stent or left URS as able after evaluation and resolution of arrythmia to the degree necessary to safely have anesthesia.

## 2018-09-04 NOTE — SUBJECTIVE & OBJECTIVE
Past Medical History:   Diagnosis Date    Atrial fibrillation     Hypertension     Sleep apnea        Past Surgical History:   Procedure Laterality Date    UPPER GASTROINTESTINAL ENDOSCOPY         Review of patient's allergies indicates:   Allergen Reactions    Shellfish containing products Hives       No current facility-administered medications on file prior to encounter.      Current Outpatient Medications on File Prior to Encounter   Medication Sig    apixaban (ELIQUIS) 5 mg Tab Take 5 mg by mouth 2 (two) times daily.    lisinopril (PRINIVIL,ZESTRIL) 20 MG tablet Take 1 tablet (20 mg total) by mouth once daily.    metoprolol succinate (TOPROL-XL) 50 MG 24 hr tablet TK 1 T PO ONCE A DAY (Patient taking differently: Take 100 mg by mouth 2 (two) times daily. TK 1 T PO ONCE A DAY)    dronedarone (MULTAQ) 400 mg Tab Take 1 tablet (400 mg total) by mouth 2 (two) times daily with meals.     Family History     Problem Relation (Age of Onset)    Hypertension Mother        Tobacco Use    Smoking status: Never Smoker    Smokeless tobacco: Never Used   Substance and Sexual Activity    Alcohol use: No    Drug use: No    Sexual activity: Not on file     Review of Systems   Constitution: Negative.   HENT: Negative.    Eyes: Negative.    Cardiovascular: Negative.    Respiratory: Negative.    Endocrine: Negative.    Hematologic/Lymphatic: Negative.    Skin: Negative.    Musculoskeletal: Negative.    Gastrointestinal: Negative.    Genitourinary: Negative.    Neurological: Negative.    Psychiatric/Behavioral: Negative.    Allergic/Immunologic: Negative.      Objective:     Vital Signs (Most Recent):  Temp: 98.2 °F (36.8 °C) (09/04/18 1205)  Pulse: 108 (09/04/18 1510)  Resp: 19 (09/04/18 1510)  BP: 128/71 (09/04/18 1510)  SpO2: 98 % (09/04/18 1510) Vital Signs (24h Range):  Temp:  [98.2 °F (36.8 °C)] 98.2 °F (36.8 °C)  Pulse:  [] 108  Resp:  [15-24] 19  SpO2:  [97 %-99 %] 98 %  BP: (119-128)/(71-78) 128/71      Weight: 79.4 kg (175 lb 0.7 oz)  Body mass index is 27.42 kg/m².    SpO2: 98 %  O2 Device (Oxygen Therapy): room air    No intake or output data in the 24 hours ending 09/04/18 1513    Lines/Drains/Airways     Peripheral Intravenous Line                 Peripheral IV - Single Lumen 09/04/18 1217 Left Antecubital less than 1 day                Physical Exam   Constitutional: He is oriented to person, place, and time. He appears well-developed and well-nourished. No distress.   HENT:   Head: Normocephalic and atraumatic.   Eyes: Pupils are equal, round, and reactive to light. Right eye exhibits no discharge. Left eye exhibits no discharge.   Neck: Neck supple. No JVD present. No thyromegaly present.   Cardiovascular: S1 normal, S2 normal, normal heart sounds and intact distal pulses. An irregularly irregular rhythm present. Tachycardia present.   No murmur heard.  Pulmonary/Chest: Effort normal and breath sounds normal. No respiratory distress. He has no wheezes. He has no rales.   Abdominal: Soft. He exhibits no distension. There is no tenderness. There is no rebound.   Musculoskeletal: He exhibits no edema.   Neurological: He is alert and oriented to person, place, and time.   Skin: Skin is warm and dry. He is not diaphoretic. No erythema.   Psychiatric: He has a normal mood and affect. His behavior is normal. Thought content normal.   Nursing note and vitals reviewed.      Significant Labs: CMP No results for input(s): NA, K, CL, CO2, GLU, BUN, CREATININE, CALCIUM, PROT, ALBUMIN, BILITOT, ALKPHOS, AST, ALT, ANIONGAP, ESTGFRAFRICA, EGFRNONAA in the last 48 hours., CBC No results for input(s): WBC, HGB, HCT, PLT in the last 48 hours., Troponin No results for input(s): TROPONINI in the last 48 hours. and All pertinent lab results from the last 24 hours have been reviewed.    Significant Imaging: Echocardiogram:   2D echo with color flow doppler:   Results for orders placed or performed in visit on 04/01/16   2D  echo with color flow doppler   Result Value Ref Range    EF 55 55 - 65    Diastolic Dysfunction No     Est. PA Systolic Pressure 30.67     Tricuspid Valve Regurgitation MILD    , EKG: Reviewed and X-Ray: CXR: X-Ray Chest 1 View (CXR): No results found for this visit on 09/04/18. and X-Ray Chest PA and Lateral (CXR): No results found for this visit on 09/04/18.

## 2018-09-05 ENCOUNTER — TELEPHONE (OUTPATIENT)
Dept: UROLOGY | Facility: CLINIC | Age: 48
End: 2018-09-05

## 2018-09-05 ENCOUNTER — PATIENT MESSAGE (OUTPATIENT)
Dept: UROLOGY | Facility: CLINIC | Age: 48
End: 2018-09-05

## 2018-09-05 VITALS
HEART RATE: 74 BPM | OXYGEN SATURATION: 97 % | SYSTOLIC BLOOD PRESSURE: 120 MMHG | HEIGHT: 67 IN | TEMPERATURE: 96 F | WEIGHT: 182.31 LBS | RESPIRATION RATE: 18 BRPM | BODY MASS INDEX: 28.61 KG/M2 | DIASTOLIC BLOOD PRESSURE: 77 MMHG

## 2018-09-05 DIAGNOSIS — N20.0 KIDNEY STONES: Primary | ICD-10-CM

## 2018-09-05 PROBLEM — I48.91 ATRIAL FIBRILLATION WITH RAPID VENTRICULAR RESPONSE: Status: ACTIVE | Noted: 2018-09-05

## 2018-09-05 LAB
ALBUMIN SERPL BCP-MCNC: 3.7 G/DL
ALP SERPL-CCNC: 53 U/L
ALT SERPL W/O P-5'-P-CCNC: 20 U/L
ANION GAP SERPL CALC-SCNC: 12 MMOL/L
AST SERPL-CCNC: 14 U/L
BASOPHILS # BLD AUTO: 0.02 K/UL
BASOPHILS NFR BLD: 0.3 %
BILIRUB SERPL-MCNC: 1 MG/DL
BUN SERPL-MCNC: 14 MG/DL
CALCIUM SERPL-MCNC: 9.2 MG/DL
CHLORIDE SERPL-SCNC: 102 MMOL/L
CO2 SERPL-SCNC: 22 MMOL/L
CREAT SERPL-MCNC: 1 MG/DL
DIFFERENTIAL METHOD: ABNORMAL
EOSINOPHIL # BLD AUTO: 0 K/UL
EOSINOPHIL NFR BLD: 0.1 %
ERYTHROCYTE [DISTWIDTH] IN BLOOD BY AUTOMATED COUNT: 13.2 %
EST. GFR  (AFRICAN AMERICAN): >60 ML/MIN/1.73 M^2
EST. GFR  (NON AFRICAN AMERICAN): >60 ML/MIN/1.73 M^2
GLUCOSE SERPL-MCNC: 136 MG/DL
HCT VFR BLD AUTO: 43.6 %
HGB BLD-MCNC: 15.3 G/DL
LYMPHOCYTES # BLD AUTO: 1.1 K/UL
LYMPHOCYTES NFR BLD: 14.9 %
MCH RBC QN AUTO: 31.5 PG
MCHC RBC AUTO-ENTMCNC: 35.1 G/DL
MCV RBC AUTO: 90 FL
MONOCYTES # BLD AUTO: 0.2 K/UL
MONOCYTES NFR BLD: 2.7 %
NEUTROPHILS # BLD AUTO: 5.8 K/UL
NEUTROPHILS NFR BLD: 82 %
PLATELET # BLD AUTO: 213 K/UL
PMV BLD AUTO: 10.2 FL
POTASSIUM SERPL-SCNC: 4 MMOL/L
PROT SERPL-MCNC: 6.9 G/DL
RBC # BLD AUTO: 4.85 M/UL
SODIUM SERPL-SCNC: 136 MMOL/L
WBC # BLD AUTO: 7.07 K/UL

## 2018-09-05 PROCEDURE — 25000003 PHARM REV CODE 250: Performed by: PHYSICIAN ASSISTANT

## 2018-09-05 PROCEDURE — 25000003 PHARM REV CODE 250: Performed by: NURSE PRACTITIONER

## 2018-09-05 PROCEDURE — 21400001 HC TELEMETRY ROOM

## 2018-09-05 PROCEDURE — 99232 SBSQ HOSP IP/OBS MODERATE 35: CPT | Mod: ,,, | Performed by: INTERNAL MEDICINE

## 2018-09-05 PROCEDURE — 80053 COMPREHEN METABOLIC PANEL: CPT

## 2018-09-05 PROCEDURE — 85025 COMPLETE CBC W/AUTO DIFF WBC: CPT

## 2018-09-05 PROCEDURE — 36415 COLL VENOUS BLD VENIPUNCTURE: CPT

## 2018-09-05 PROCEDURE — 25000003 PHARM REV CODE 250: Performed by: UROLOGY

## 2018-09-05 PROCEDURE — 88300 SURGICAL PATH GROSS: CPT | Mod: 26,,, | Performed by: PATHOLOGY

## 2018-09-05 PROCEDURE — 88300 SURGICAL PATH GROSS: CPT | Performed by: PATHOLOGY

## 2018-09-05 RX ORDER — DILTIAZEM HYDROCHLORIDE 120 MG/1
120 CAPSULE, COATED, EXTENDED RELEASE ORAL DAILY
Qty: 30 CAPSULE | Refills: 0 | Status: ON HOLD | OUTPATIENT
Start: 2018-09-06 | End: 2018-10-05 | Stop reason: SDUPTHER

## 2018-09-05 RX ORDER — METOPROLOL SUCCINATE 50 MG/1
100 TABLET, EXTENDED RELEASE ORAL 2 TIMES DAILY
Start: 2018-09-05 | End: 2018-09-07 | Stop reason: DRUGHIGH

## 2018-09-05 RX ORDER — DILTIAZEM HYDROCHLORIDE 120 MG/1
120 CAPSULE, COATED, EXTENDED RELEASE ORAL DAILY
Status: DISCONTINUED | OUTPATIENT
Start: 2018-09-05 | End: 2018-09-05 | Stop reason: HOSPADM

## 2018-09-05 RX ADMIN — HYDROCODONE BITARTRATE AND ACETAMINOPHEN 1 TABLET: 5; 325 TABLET ORAL at 03:09

## 2018-09-05 RX ADMIN — DILTIAZEM HYDROCHLORIDE 5 MG/HR: 5 INJECTION INTRAVENOUS at 06:09

## 2018-09-05 RX ADMIN — DILTIAZEM HYDROCHLORIDE 120 MG: 120 CAPSULE, COATED, EXTENDED RELEASE ORAL at 10:09

## 2018-09-05 RX ADMIN — METOPROLOL SUCCINATE 100 MG: 50 TABLET, EXTENDED RELEASE ORAL at 08:09

## 2018-09-05 RX ADMIN — LISINOPRIL 20 MG: 20 TABLET ORAL at 08:09

## 2018-09-05 NOTE — HOSPITAL COURSE
The pt with hx chronic, persistent Afib was admitted with Afib with RVR on IV Cardizem. The pt had left ureteral stone underwent Left ureteroscopy with laser lithotripsy and stone basket extraction and Cystoscopy with placement of left double-J ureteral stent 9/4/18 per Dr. Baker. Prior to surgery, the pt had Afib with RVR. Cardiology was consulted. Pt received IV Cardizem and was placed on a IV Cardizem drip. He underwent surgery and was admitted for uncontrolled Afib on IV Cardizem. Rate was controlled overnight. Cardiology recommended adding Cardizem 120mg daily. Pt instructed to restart Eliquis tomorrow.   Of note, pt reports he took his Toprol the am of surgery. His cardiologist is Dr. Parker. He was on Multaq until the end of July this year. Since then, he had 2 episodes of Afib with RVR at [facility]. He also has been holding Eliquis for approximately one month.   Pt instructed to follow up with Dr. Parker within one week.

## 2018-09-05 NOTE — PROGRESS NOTES
Pt was seen by cardiology and with appropriate meds was made stable for surgery.  URS/laser litho completed without complication.  Pt to remove the string and pull stent Monday.  RTC with KUB/US in approx 3 wks

## 2018-09-05 NOTE — ANESTHESIA POSTPROCEDURE EVALUATION
"Anesthesia Post Evaluation    Patient: Renzo Salgado    Procedure(s) Performed: Procedure(s) (LRB):  URETEROSCOPY (Left)    Anesthesia plan: None.  Patient location during evaluation: PACU  Patient participation: Yes- Able to Participate  Level of consciousness: awake and alert and oriented  Post-procedure vital signs: reviewed and stable (Stable but in Afibb with RVR)  Pain management: adequate  Airway patency: patent  PONV status at discharge: No PONV  Anesthetic complications: no      Cardiovascular status: tachycardic  Respiratory status: unassisted, room air and spontaneous ventilation  Hydration status: euvolemic  Follow-up not needed.        Visit Vitals  /89 (BP Location: Right arm, Patient Position: Lying)   Pulse 76   Temp 37.1 °C (98.7 °F)   Resp (!) 26   Ht 5' 7" (1.702 m)   Wt 79.4 kg (175 lb 0.7 oz)   SpO2 98%   BMI 27.42 kg/m²       Pain/Deisi Score: Pain Assessment Performed: Yes (9/4/2018  8:30 PM)  Presence of Pain: denies (9/4/2018  8:30 PM)  Deisi Score: 10 (9/4/2018  8:30 PM)        "

## 2018-09-05 NOTE — HOSPITAL COURSE
9/5/18-Patient seen and examined today, s/p ureteroscopy yesterday. Stable overnight. HR controlled with addition of cardizem. No cardiac complaints.

## 2018-09-05 NOTE — TRANSFER OF CARE
"Anesthesia Transfer of Care Note    Patient: Renzo Salgado    Procedure(s) Performed: Procedure(s) (LRB):  LITHOTRIPSY, USING LASER (Left)  EXTRACTION - STONE (Left)  CYSTOSCOPY, WITH URETERAL STENT INSERTION (Left)    Anesthesia Type: general    Transport from OR: Transported from OR on room air with adequate spontaneous ventilation    Post pain: adequate analgesia    Post assessment: no apparent anesthetic complications and tolerated procedure well    Post vital signs: stable    Level of consciousness: awake, alert and oriented    Nausea/Vomiting: no nausea/vomiting    Complications: none    Transfer of care protocol was followed      Last vitals:   Visit Vitals  /72 (BP Location: Right arm, Patient Position: Lying)   Pulse 86   Temp 37.1 °C (98.7 °F)   Resp (!) 25   Ht 5' 7" (1.702 m)   Wt 79.4 kg (175 lb 0.7 oz)   SpO2 96%   BMI 27.42 kg/m²     "

## 2018-09-05 NOTE — PROGRESS NOTES
[facility]   Cardiology  Progress Note    Patient Name: Renzo Salgado  MRN: [id number]  Admission Date: 9/4/2018  Hospital Length of Stay: 0 days  Code Status: Full Code   Attending Physician: Abbi Abebe MD   Primary Care Physician: Fadi Damico MD  Expected Discharge Date:   Principal Problem:Ureteral stone    Subjective:   HPI:  Mr. Salgado is a 48 year old male patient with a PMHx of persistent atrial fibrillation (on Eliquis) and HTN who presented to [facility] for elective ureteroscopy due to ureteral stone. Pre-operatively, patient with afib with RVR with HR in 130's. Cardiology consulted to assist with management. Patient seen and examined today. Feels well. No complaints. Denies any chest pain or SOB. No lightheadedness, dizziness, near syncope, or syncope. Followed on OP basis by Dr. Parker and was given clearance for procedure. Compliant with medications.    HR variable at time of exam ranging from 90's-130's.     Hospital Course:   9/5/18-Patient seen and examined today, s/p ureteroscopy yesterday. Stable overnight. HR controlled with addition of cardizem. No cardiac complaints.         Review of Systems   Constitution: Negative.   HENT: Negative.    Eyes: Negative.    Cardiovascular: Negative.    Respiratory: Negative.    Endocrine: Negative.    Hematologic/Lymphatic: Negative.    Skin: Negative.    Musculoskeletal: Negative.    Gastrointestinal: Negative.    Genitourinary: Negative.    Neurological: Negative.    Psychiatric/Behavioral: Negative.    Allergic/Immunologic: Negative.      Objective:     Vital Signs (Most Recent):  Temp: 96.2 °F (35.7 °C) (09/05/18 1131)  Pulse: 68 (09/05/18 1131)  Resp: 18 (09/05/18 1131)  BP: 120/77 (09/05/18 1131)  SpO2: 97 % (09/05/18 1131) Vital Signs (24h Range):  Temp:  [96.2 °F (35.7 °C)-98.7 °F (37.1 °C)] 96.2 °F (35.7 °C)  Pulse:  [] 68  Resp:  [14-29] 18  SpO2:  [93 %-100 %] 97 %  BP: ()/(52-89) 120/77     Weight: 82.7 kg  (182 lb 5.1 oz)  Body mass index is 28.56 kg/m².     SpO2: 97 %  O2 Device (Oxygen Therapy): room air      Intake/Output Summary (Last 24 hours) at 9/5/2018 1229  Last data filed at 9/5/2018 0800  Gross per 24 hour   Intake 1550.5 ml   Output 225 ml   Net 1325.5 ml       Lines/Drains/Airways     Peripheral Intravenous Line                 Peripheral IV - Single Lumen 09/04/18 1217 Left Antecubital 1 day                Physical Exam   Constitutional: He is oriented to person, place, and time. He appears well-developed and well-nourished. No distress.   HENT:   Head: Normocephalic and atraumatic.   Eyes: Pupils are equal, round, and reactive to light. Right eye exhibits no discharge. Left eye exhibits no discharge.   Neck: Neck supple. No JVD present. No thyromegaly present.   Cardiovascular: Normal rate, S1 normal, S2 normal and normal heart sounds. An irregularly irregular rhythm present.   No murmur heard.  Pulmonary/Chest: Effort normal and breath sounds normal. No respiratory distress. He has no wheezes. He has no rales.   Abdominal: Soft. He exhibits no distension. There is no tenderness. There is no rebound.   Musculoskeletal: He exhibits no edema.   Neurological: He is alert and oriented to person, place, and time.   Skin: Skin is warm and dry. He is not diaphoretic. No erythema.   Psychiatric: He has a normal mood and affect. His behavior is normal. Thought content normal.   Nursing note and vitals reviewed.      Significant Labs:   CMP   Recent Labs   Lab  09/05/18   0441   NA  136   K  4.0   CL  102   CO2  22*   GLU  136*   BUN  14   CREATININE  1.0   CALCIUM  9.2   PROT  6.9   ALBUMIN  3.7   BILITOT  1.0   ALKPHOS  53*   AST  14   ALT  20   ANIONGAP  12   ESTGFRAFRICA  >60   EGFRNONAA  >60   , CBC   Recent Labs   Lab  09/05/18   0441   WBC  7.07   HGB  15.3   HCT  43.6   PLT  213   , Troponin No results for input(s): TROPONINI in the last 48 hours. and All pertinent lab results from the last 24 hours have  been reviewed.    Significant Imaging: Echocardiogram:   2D echo with color flow doppler:   Results for orders placed or performed in visit on 04/01/16   2D echo with color flow doppler   Result Value Ref Range    EF 55 55 - 65    Diastolic Dysfunction No     Est. PA Systolic Pressure 30.67     Tricuspid Valve Regurgitation MILD    , EKG: Reviewed and X-Ray: CXR: X-Ray Chest 1 View (CXR): No results found for this visit on 09/04/18. and X-Ray Chest PA and Lateral (CXR): No results found for this visit on 09/04/18.    Assessment and Plan:   Patient who presents with chronic, persistent afib. HR now controlled. D/C home on cardizem and BB. Re-start anticoagulation ASAP. Follow-up with primary cardiologist as OP.    * Ureteral stone    -Mgmt as per urology  -S/P ureteroscopy         Atrial fibrillation    -Patient with chronic, persistent afib  -HR now controlled  -Ok to d/c home on Metoprolol and cardizem po 120 mg daily  -Re-start AC ASAP  -Follow-up with primary cardiologist, Dr. Parker          Essential hypertension    -Continue Toprol XL  -Cardizem 120 mg daily added for HR control            VTE Risk Mitigation (From admission, onward)        Ordered     Place sequential compression device  Until discontinued      09/04/18 1204          Noris Sanchez PA-C  Cardiology  [facility]    Chart reviewed. Dr. Culp examined patient and agrees with plan as outlined above.

## 2018-09-05 NOTE — OP NOTE
Date of Procedure: 09/04/2018    PREOPERATIVE DIAGNOSIS:  Left ureteral stone.                                                                                                           POSTOPERATIVE DIAGNOSIS:  Left ureteral stone.                               PROCEDURES:                                                                  1.  Left ureteroscopy with laser lithotripsy and stone basket extraction.                          2.  Cystoscopy with placement of left double-J ureteral stent.              3.  Fluoro less than 1 hour.                                                 SURGEON:  Dewey Baker IV, M.D.                                          Assistants: None    Specimen: None    Prosthetics, Devices, Grafts: None    ANESTHESIA:  General endotracheal.                                           FINDINGS:  The patient had an approximately 8mm stone in the distal left ureter which was obstructing.  Ureteroscopy was possible after placement of an appropriate guidewire. The stone was broken into small pieces. A stone basket was used, and all significant pieces were removed and brought to the back table for pathologic examination.  A 6-Kenyan x 26 cm double-J ureteral stent was placed. Flouroscopy was used throughout the case for wire and scope guidance, and if applicable to check final stent placement.                                    BLOOD LOSS:  None.                                                           BLOOD GIVEN:  None.                                                          URINE OUTPUT:  None.                                                         DRAINS:  6-Kenyan x 26 cm left double-J ureteral stent.                      PROCEDURE IN DETAIL:  After proper consents were obtained, the patient was brought to the Operating Room where he was prepped and draped in normal sterile fashion and provided general endotracheal anesthesia in dorsal lithotomy position.  A 22-Kenyan cystoscope was  assembled and introduced per urethra and the bladder was rinsed and drained.  Fluoroscopy was used to evaluate stone position at the start of the case and throughout the case for wire and scope guidance.    An attempt was made to gently pass a guidewire up the ureter, and then the cystoscope was set aside with the guidewire in place.      The semirigid ureteroscope was then brought to bear and easily passed into the ureteral orifice.  The stone was obstructing the ureter and observed a few cm proximally.  The medium laser fiber was brought in and with 1 joules of energy and 5 repetitions per second, the stone was broken into a large number of small pieces.  It was observed that the pieces would not pass easily through the distal ureter, so a stone basket was used and with multiple passes into the ureter, the larger stone fragments were brought to the bladder or back table.  The ureteroscope was then set aside and the cystoscope replaced into the bladder over the wire.  A double-J ureteral stent was advanced coaxially over the wire and up to the renal pelvis, and there was an excellent curl on both ends when the wire was withdrawn, using fluoroscopic guidance.      String was left attached and later taped to the penis, so the patient can remove the stent at a later date.      The bladder was then rinsed and drained and stone fragments collected for pathologic examination.  After the bladder was drained, cystoscope was withdrawn and set aside.      The pt tolerated all of this well, and there were no complications.  he was awakened and transferred to Recovery in stable condition.

## 2018-09-05 NOTE — ASSESSMENT & PLAN NOTE
-Patient with chronic, persistent afib  -HR now controlled  -Ok to d/c home on Metoprolol and cardizem po 120 mg daily  -Re-start AC ASA  -Follow-up with primary cardiologist, Dr. Parker

## 2018-09-05 NOTE — PROGRESS NOTES
Subjective:      Renzo Salgado is a 48 y.o. male POD# 1 s/p left ureteroscopy with laser litho.     Diet:  reg, no nausea, some flatus  Pain well controlled   Ambulating a bit     Objective:     Temp:  [97 °F (36.1 °C)-98.7 °F (37.1 °C)] 98.1 °F (36.7 °C)  Pulse:  [] 85  Resp:  [14-29] 16  SpO2:  [93 %-100 %] 95 %  BP: ()/(52-89) 122/89  I/O last 3 completed shifts:  In: 1310.5 [P.O.:240; I.V.:1070.5]  Out: 225 [Urine:225]       Gen WDWN in NAD  Resp non-labored  CV Regular  Abd soft, non-distended    Labs:   Recent Labs   Lab  09/05/18   0441   NA  136   K  4.0   CL  102   CO2  22*   BUN  14   CREATININE  1.0   CALCIUM  9.2     Recent Labs   Lab  09/05/18   0441   WBC  7.07   HGB  15.3   HCT  43.6   PLT  213       Assessment/Plan:     1.  Ambulate qid in hallways  2.  Pt to pull string and remove stent Monday.  KUB/US/RTC 3 wks.        Ricky Baker IV, MD

## 2018-09-05 NOTE — PROGRESS NOTES
Discharge instructions given, and patient verbalized understanding.  Patient remained free of falls, accidents, and trama during the day shift.  IV and cardiac monitor has been d/c.  Cardiac monitor returned to the cardiac monitoring room.  Patient discharged to a private vehicle via a wheel chair.

## 2018-09-05 NOTE — PLAN OF CARE
Problem: Patient Care Overview  Goal: Plan of Care Review  Outcome: Ongoing (interventions implemented as appropriate)  POC reviewed with pt and spouse, both verbalized understanding. S/p lithotripsy, ureter stone extraction and stent placement; blood tinged urine noted this shift. Pain well controlled with prn norco per order. Remains Afib on cardiac monitor, rate 60s-80s. Cardizem gtt infusing at 5mg/hr per order. Ambulates with standby assist d/t equipment and post-op anesthesia, turns and repositions independently. Bed in low position, nonskid socks, call light in reach. Remains free from injury/falls this shift. Instructed to call for assistance.

## 2018-09-05 NOTE — DISCHARGE SUMMARY
[facility] Hospital Medicine  Discharge Summary      Patient Name: Renzo Salgado  MRN: [id number]  Admission Date: 9/4/2018  Hospital Length of Stay: 0 days  Discharge Date and Time:  09/05/2018 4:12 PM  Attending Physician: Abbi Abebe MD   Discharging Provider: Inge Reyes NP  Primary Care Provider: Fadi Damico MD      HPI:   Renzo Salgado is a 47 yo male with a PMHx of Atrial fibrillation and HTN who presented to Pre for ureteroscopy per  (Urology).  Atrial fibrillation noted to monitor with 's. Pt reports palpitations and denies any other associated symptoms.  Pt reports compliance with prescribed medications with Lopressor taken this morning.  Pt states he takes Eliquis but has been off medication for approximately  1 month due to pending procedure.  Pt denies diagnosis of sleep apnea.  Current procedure has been cancelled at [facility] twice due to reccurrence of paroxysmal Afib.  Procedure cancelled at this time.  Pt denies smoking and use of ETOH.   Hospital Medicine consulted for Observation admit with Cardiology consulted and Cardizem infusion initiated.      Procedure(s) (LRB):  LITHOTRIPSY, USING LASER (Left)  EXTRACTION - STONE (Left)  CYSTOSCOPY, WITH URETERAL STENT INSERTION (Left)      Hospital Course:   The pt with hx chronic, persistent Afib was admitted with Afib with RVR on IV Cardizem. The pt had left ureteral stone underwent Left ureteroscopy with laser lithotripsy and stone basket extraction and Cystoscopy with placement of left double-J ureteral stent 9/4/18 per Dr. Baker. Prior to surgery, the pt had Afib with RVR. Cardiology was consulted. Pt received IV Cardizem and was placed on a IV Cardizem drip. He underwent surgery and was admitted for uncontrolled Afib on IV Cardizem. Rate was controlled overnight. Cardiology recommended adding Cardizem 120mg daily. Pt reports some hematuria since surgery. Discussed with Dr. Baker- instructed  to restart Eliquis tomorrow.   Of note, pt reports he took his Toprol the am of surgery. His cardiologist is Dr. Parker. He was on Multaq until the end of July this year. Since then, he had 2 episodes of Afib with RVR at [facility]. He also has been holding Eliquis for approximately one month.   Pt instructed to follow up with Dr. Parker within one week.          Consults:   Consults (From admission, onward)        Status Ordering Provider     Inpatient consult to Cardiology  Once     Provider:  ARACELI Villar JAMES D. IV            Final Active Diagnoses:    Diagnosis Date Noted POA    PRINCIPAL PROBLEM:  Atrial fibrillation with rapid ventricular response [I48.91] 09/05/2018 Yes    Ureteral stone [N20.1] 09/04/2018 Yes    Renal stone [N20.0] 09/04/2018 Yes    Essential hypertension [I10] 12/01/2015 Yes      Problems Resolved During this Admission:       Discharged Condition: stable    Disposition: Home or Self Care    Follow Up:  Follow-up Information     Fadi Damico MD In 5 days.    Specialty:  Internal Medicine  Contact information:  [address] [address]  [phone]             Ricky Baker IV, MD.    Specialty:  Urology  Why:  as scheduled   Contact information:  [address] [address]  [phone]             Ned Parker MD In 1 week.    Specialty:  Cardiovascular Disease  Contact information:  [address] [address]  [phone]                 Patient Instructions:      Diet Cardiac     Activity as tolerated       Significant Diagnostic Studies:       Pending Diagnostic Studies:     None         Medications:  Reconciled Home Medications:      Medication List      START taking these medications    diltiaZEM 120 MG Cp24  Commonly known as:  CARDIZEM CD  Take 1 capsule (120 mg total) by mouth once daily.        CHANGE how you take these medications    apixaban 5 mg Tab  Commonly known as:  ELIQUIS  Take 1 tablet (5 mg total)  by mouth 2 (two) times daily. Restart tomorrow 9/6/18  What changed:  additional instructions     metoprolol succinate 50 MG 24 hr tablet  Commonly known as:  TOPROL-XL  Take 2 tablets (100 mg total) by mouth 2 (two) times daily. TK 1 T PO ONCE A DAY  What changed:    · how much to take  · how to take this  · when to take this        CONTINUE taking these medications    lisinopril 20 MG tablet  Commonly known as:  PRINIVIL,ZESTRIL  Take 1 tablet (20 mg total) by mouth once daily.                  Indwelling Lines/Drains at time of discharge:   Lines/Drains/Airways          None          Time spent on the discharge of patient: 42 minutes  Patient was seen and examined on the date of discharge and determined to be suitable for discharge.         Inge Reyes NP  Department of Hospital Medicine  [facility]

## 2018-09-05 NOTE — ANESTHESIA RELEASE NOTE
"Anesthesia Release from PACU Note    Patient: Renzo Salgado    Procedure(s) Performed: Procedure(s) (LRB):  LITHOTRIPSY, USING LASER (Left)  EXTRACTION - STONE (Left)  CYSTOSCOPY, WITH URETERAL STENT INSERTION (Left)    Anesthesia type: MAC    Post pain: Adequate analgesia    Post assessment: no apparent anesthetic complications, tolerated procedure well and no evidence of recall    Last Vitals:   Visit Vitals  /72 (BP Location: Right arm, Patient Position: Lying)   Pulse 86   Temp 37.1 °C (98.7 °F)   Resp (!) 25   Ht 5' 7" (1.702 m)   Wt 79.4 kg (175 lb 0.7 oz)   SpO2 96%   BMI 27.42 kg/m²       Post vital signs: stable    Level of consciousness: awake, alert  and oriented    Nausea/Vomiting: no nausea/no vomiting    Complications: none    Airway Patency: patent    Respiratory: unassisted, spontaneous ventilation, room air    Cardiovascular: stable and blood pressure at baseline    Hydration: euvolemic  "

## 2018-09-05 NOTE — SUBJECTIVE & OBJECTIVE
Review of Systems   Constitution: Negative.   HENT: Negative.    Eyes: Negative.    Cardiovascular: Negative.    Respiratory: Negative.    Endocrine: Negative.    Hematologic/Lymphatic: Negative.    Skin: Negative.    Musculoskeletal: Negative.    Gastrointestinal: Negative.    Genitourinary: Negative.    Neurological: Negative.    Psychiatric/Behavioral: Negative.    Allergic/Immunologic: Negative.      Objective:     Vital Signs (Most Recent):  Temp: 96.2 °F (35.7 °C) (09/05/18 1131)  Pulse: 68 (09/05/18 1131)  Resp: 18 (09/05/18 1131)  BP: 120/77 (09/05/18 1131)  SpO2: 97 % (09/05/18 1131) Vital Signs (24h Range):  Temp:  [96.2 °F (35.7 °C)-98.7 °F (37.1 °C)] 96.2 °F (35.7 °C)  Pulse:  [] 68  Resp:  [14-29] 18  SpO2:  [93 %-100 %] 97 %  BP: ()/(52-89) 120/77     Weight: 82.7 kg (182 lb 5.1 oz)  Body mass index is 28.56 kg/m².     SpO2: 97 %  O2 Device (Oxygen Therapy): room air      Intake/Output Summary (Last 24 hours) at 9/5/2018 1229  Last data filed at 9/5/2018 0800  Gross per 24 hour   Intake 1550.5 ml   Output 225 ml   Net 1325.5 ml       Lines/Drains/Airways     Peripheral Intravenous Line                 Peripheral IV - Single Lumen 09/04/18 1217 Left Antecubital 1 day                Physical Exam   Constitutional: He is oriented to person, place, and time. He appears well-developed and well-nourished. No distress.   HENT:   Head: Normocephalic and atraumatic.   Eyes: Pupils are equal, round, and reactive to light. Right eye exhibits no discharge. Left eye exhibits no discharge.   Neck: Neck supple. No JVD present. No thyromegaly present.   Cardiovascular: Normal rate, S1 normal, S2 normal and normal heart sounds. An irregularly irregular rhythm present.   No murmur heard.  Pulmonary/Chest: Effort normal and breath sounds normal. No respiratory distress. He has no wheezes. He has no rales.   Abdominal: Soft. He exhibits no distension. There is no tenderness. There is no rebound.    Musculoskeletal: He exhibits no edema.   Neurological: He is alert and oriented to person, place, and time.   Skin: Skin is warm and dry. He is not diaphoretic. No erythema.   Psychiatric: He has a normal mood and affect. His behavior is normal. Thought content normal.   Nursing note and vitals reviewed.      Significant Labs:   CMP   Recent Labs   Lab  09/05/18   0441   NA  136   K  4.0   CL  102   CO2  22*   GLU  136*   BUN  14   CREATININE  1.0   CALCIUM  9.2   PROT  6.9   ALBUMIN  3.7   BILITOT  1.0   ALKPHOS  53*   AST  14   ALT  20   ANIONGAP  12   ESTGFRAFRICA  >60   EGFRNONAA  >60   , CBC   Recent Labs   Lab  09/05/18   0441   WBC  7.07   HGB  15.3   HCT  43.6   PLT  213   , Troponin No results for input(s): TROPONINI in the last 48 hours. and All pertinent lab results from the last 24 hours have been reviewed.    Significant Imaging: Echocardiogram:   2D echo with color flow doppler:   Results for orders placed or performed in visit on 04/01/16   2D echo with color flow doppler   Result Value Ref Range    EF 55 55 - 65    Diastolic Dysfunction No     Est. PA Systolic Pressure 30.67     Tricuspid Valve Regurgitation MILD    , EKG: Reviewed and X-Ray: CXR: X-Ray Chest 1 View (CXR): No results found for this visit on 09/04/18. and X-Ray Chest PA and Lateral (CXR): No results found for this visit on 09/04/18.

## 2018-09-05 NOTE — PLAN OF CARE
Problem: Patient Care Overview  Goal: Plan of Care Review  Outcome: Outcome(s) achieved Date Met: 09/05/18  Patient is ready for discharge.  Patient will let me know when his transportation arrives.

## 2018-09-05 NOTE — NURSING
Pt arrived to room from PACU via bed accompanied by RN. Cardizem gtt infusing as 5mg/hr per order. VSS, no acute distress noted. Cardiac monitor placed. Oriented to room, all questions answered. Wife notified of pt transfer to floor.

## 2018-09-06 ENCOUNTER — PATIENT MESSAGE (OUTPATIENT)
Dept: UROLOGY | Facility: CLINIC | Age: 48
End: 2018-09-06

## 2018-09-06 ENCOUNTER — PATIENT OUTREACH (OUTPATIENT)
Dept: ADMINISTRATIVE | Facility: CLINIC | Age: 48
End: 2018-09-06

## 2018-09-06 NOTE — ANESTHESIA POSTPROCEDURE EVALUATION
"Anesthesia Post Evaluation    Patient: Renzo Salgado    Procedure(s) Performed: Procedure(s) (LRB):  LITHOTRIPSY, USING LASER (Left)  EXTRACTION - STONE (Left)  CYSTOSCOPY, WITH URETERAL STENT INSERTION (Left)    Final Anesthesia Type: general  Patient location during evaluation: PACU  Patient participation: Yes- Able to Participate  Level of consciousness: awake and alert and oriented  Post-procedure vital signs: reviewed and stable (Pt on Cardizem drip)  Pain management: adequate  Airway patency: patent  PONV status at discharge: No PONV  Anesthetic complications: no      Cardiovascular status: hemodynamically stable  Respiratory status: unassisted, spontaneous ventilation and room air  Hydration status: euvolemic  Follow-up not needed.        Visit Vitals  /77   Pulse 74   Temp 35.7 °C (96.2 °F)   Resp 18   Ht 5' 7" (1.702 m)   Wt 82.7 kg (182 lb 5.1 oz)   SpO2 97%   BMI 28.56 kg/m²       Pain/Deisi Score: Pain Assessment Performed: Yes (9/5/2018  3:00 PM)  Presence of Pain: denies (9/5/2018  3:00 PM)  Pain Rating Prior to Med Admin: 5 (9/5/2018  3:57 AM)  Pain Rating Post Med Admin: 1 (9/4/2018 11:34 PM)  Deisi Score: 10 (9/4/2018  8:30 PM)        "

## 2018-09-06 NOTE — PATIENT INSTRUCTIONS
Shock Wave Lithotripsy  Passing a kidney stone can be very painful. Shock wave lithotripsy is a treatment that helps by breaking the kidney stone into smaller pieces that are easier to pass. This treatment is also called extracorporeal shock wave lithotripsy (ESWL). Lithotripsy takes about an hour. Its done in a hospital, lithotripsy center, or mobile lithotripsy van. You will likely go home the same day. This treatment is not used for all types of kidney stones. Your healthcare provider will discuss whether this is the right treatment for the type of stone you have.      Energy waves strike the stone, which begins to crack. The stone crumbles into tiny pieces.   During the procedure  · You get medicine to prevent pain and help you relax or sleep during lithotripsy. Once this takes effect, the procedure will start.  · A flexible tube (stent) with holes in it may be placed into your ureter, the tube that connects the kidney and the bladder. This helps keep urine flowing from the kidney.  · Your healthcare provider then uses X-ray or ultrasound to find the exact location of the kidney stone.  · Sound waves are aimed at the stone and sent at high speed. If youre awake, you may feel a tapping as they pass through your body.  After the procedure  · Youll be closely watched in a recovery room for about 1 to 3 hours. Antibiotics and pain medicine may be prescribed before you leave.  · Youll have a follow-up visit in a few weeks. If you received a stent, it will be removed. Your healthcare provider will also check for pieces of stone. If large pieces remain, you may need a second lithotripsy or another procedure.  Possible risks and complications  · Infection  · Bleeding in the kidney  · Bruising of the kidney or skin  · Blockage (obstruction) of the ureter  · Failure to break up the stone (other procedures may be needed)   Passing the stone  It can take a day to several weeks for the pieces of stone to leave your  body. Drink plenty of liquids to help flush your system. During this time:  · Your urine may be cloudy or slightly bloody. You may even see small pieces of stone.  · You may have a slight fever and some pain. Take prescribed or over-the-counter pain medicine as instructed by your healthcare provider.  · You may be asked to strain your urine to collect some stone particles. These will be studied in the lab.  When to call your healthcare provider   Call your healthcare provider right away if you have any of the following:  · Fever of 100.4°F (38°C) or higher, or as directed by your healthcare provider  · Heavy bleeding  · Pain that doesnt go away with medicine  · Upset stomach and vomiting  · Problems urinating   Date Last Reviewed: 1/1/2017  © 3498-6765 [employer]. [address] [place] [address], [place] [address]. All rights reserved. This information is not intended as a substitute for professional medical care. Always follow your healthcare professional's instructions.

## 2018-09-07 ENCOUNTER — OFFICE VISIT (OUTPATIENT)
Dept: INTERNAL MEDICINE | Facility: CLINIC | Age: 48
End: 2018-09-07
Payer: COMMERCIAL

## 2018-09-07 VITALS
DIASTOLIC BLOOD PRESSURE: 76 MMHG | WEIGHT: 180.56 LBS | BODY MASS INDEX: 28.34 KG/M2 | SYSTOLIC BLOOD PRESSURE: 124 MMHG | HEIGHT: 67 IN | OXYGEN SATURATION: 98 % | HEART RATE: 63 BPM | TEMPERATURE: 97 F

## 2018-09-07 DIAGNOSIS — I10 ESSENTIAL HYPERTENSION: ICD-10-CM

## 2018-09-07 DIAGNOSIS — Z00.00 PREVENTATIVE HEALTH CARE: ICD-10-CM

## 2018-09-07 DIAGNOSIS — I48.91 ATRIAL FIBRILLATION WITH RAPID VENTRICULAR RESPONSE: Primary | ICD-10-CM

## 2018-09-07 DIAGNOSIS — Z23 NEED FOR DIPHTHERIA-TETANUS-PERTUSSIS (TDAP) VACCINE: ICD-10-CM

## 2018-09-07 LAB
ANNOTATION COMMENT IMP: NORMAL
COMPN STONE: NORMAL
SPECIMEN SOURCE: NORMAL
STONE ANALYSIS IR-IMP: NORMAL

## 2018-09-07 PROCEDURE — 99495 TRANSJ CARE MGMT MOD F2F 14D: CPT | Mod: S$GLB,,, | Performed by: INTERNAL MEDICINE

## 2018-09-07 PROCEDURE — 99999 PR PBB SHADOW E&M-EST. PATIENT-LVL III: CPT | Mod: PBBFAC,,, | Performed by: INTERNAL MEDICINE

## 2018-09-07 RX ORDER — CEPHALEXIN 500 MG/1
CAPSULE ORAL
Refills: 1 | COMMUNITY
Start: 2018-07-25 | End: 2018-09-07

## 2018-09-07 RX ORDER — METOPROLOL SUCCINATE 100 MG/1
1 TABLET, EXTENDED RELEASE ORAL 2 TIMES DAILY
Refills: 3 | Status: ON HOLD | COMMUNITY
Start: 2018-08-18 | End: 2018-10-05 | Stop reason: SDUPTHER

## 2018-09-07 RX ORDER — HYDROCODONE BITARTRATE AND ACETAMINOPHEN 10; 325 MG/1; MG/1
TABLET ORAL
Refills: 0 | COMMUNITY
Start: 2018-07-25 | End: 2019-02-08

## 2018-09-07 RX ORDER — TAMSULOSIN HYDROCHLORIDE 0.4 MG/1
CAPSULE ORAL
Refills: 1 | COMMUNITY
Start: 2018-07-25 | End: 2018-09-07

## 2018-09-07 RX ORDER — AMOXICILLIN AND CLAVULANATE POTASSIUM 875; 125 MG/1; MG/1
TABLET, FILM COATED ORAL
Refills: 0 | COMMUNITY
Start: 2018-08-02 | End: 2018-09-07

## 2018-09-07 NOTE — PROGRESS NOTES
Subjective:      Patient ID: Renzo Salgado is a 48 y.o. male.    Chief Complaint: Hospital Follow Up    Transitional Care Note    Family and/or Caretaker present at visit?  No.  Diagnostic tests reviewed/disposition: No diagnosic tests pending after this hospitalization.  Disease/illness education: discussed etiology of afib. Importance of f/u and anticoagulation if afib persistent. Educated on cardioversion and ablation.   Home health/community services discussion/referrals: Patient does not have home health established from hospital visit.  They do not need home health.  If needed, we will set up home health for the patient.   Establishment or re-establishment of referral orders for community resources: No other necessary community resources.   Discussion with other health care providers: No discussion with other health care providers necessary.       47 yo with Patient Active Problem List:     Atrial fibrillation, persistent     Essential hypertension     Renal stone     Ureteral stone     Sleep apnea     Atrial fibrillation with rapid ventricular response    Past Medical History:  No date: Atrial fibrillation  No date: Hypertension  No date: Sleep apnea    Here today for [facility] hospital f/u appt after admission for afib with rvr. Dc summary reviewed as below. Pt was dc'd on added cardizem. Saw cards today with no med changes. advised to hold eliquis for now. Planning to see cards in one month.   Planning for cardioversion.       Patient Name: Renzo Salgado  MRN: [id number]  Admission Date: 9/4/2018  Hospital Length of Stay: 0 days  Discharge Date and Time:  09/05/2018 4:12 PM  Attending Physician: Abbi Abebe MD   Discharging Provider: Inge Reyes NP  Primary Care Provider: Fadi Damico MD        HPI:   Renzo Salgado is a 47 yo male with a PMHx of Atrial fibrillation and HTN who presented to Preop for ureteroscopy per  (Urology).  Atrial fibrillation noted to monitor with HR  120's. Pt reports palpitations and denies any other associated symptoms.  Pt reports compliance with prescribed medications with Lopressor taken this morning.  Pt states he takes Eliquis but has been off medication for approximately  1 month due to pending procedure.  Pt denies diagnosis of sleep apnea.  Current procedure has been cancelled at [facility] twice due to reccurrence of paroxysmal Afib.  Procedure cancelled at this time.  Pt denies smoking and use of ETOH.   Hospital Medicine consulted for Observation admit with Cardiology consulted and Cardizem infusion initiated.       Procedure(s) (LRB):  LITHOTRIPSY, USING LASER (Left)  EXTRACTION - STONE (Left)  CYSTOSCOPY, WITH URETERAL STENT INSERTION (Left)       Hospital Course:   The pt with hx chronic, persistent Afib was admitted with Afib with RVR on IV Cardizem. The pt had left ureteral stone underwent Left ureteroscopy with laser lithotripsy and stone basket extraction and Cystoscopy with placement of left double-J ureteral stent 9/4/18 per Dr. Baker. Prior to surgery, the pt had Afib with RVR. Cardiology was consulted. Pt received IV Cardizem and was placed on a IV Cardizem drip. He underwent surgery and was admitted for uncontrolled Afib on IV Cardizem. Rate was controlled overnight. Cardiology recommended adding Cardizem 120mg daily. Pt reports some hematuria since surgery. Discussed with Dr. Baker- instructed to restart Eliquis tomorrow.   Of note, pt reports he took his Toprol the am of surgery. His cardiologist is Dr. Parker. He was on Multaq until the end of July this year. Since then, he had 2 episodes of Afib with RVR at [facility]. He also has been holding Eliquis for approximately one month.   Pt instructed to follow up with Dr. Parker within one week.             Consults:   Consults (From admission, onward)        Status Ordering Provider        Inpatient consult to Cardiology  Once    Provider:  ARACELI Villar JAMES D.  "IV              Final Active Diagnoses:    Diagnosis Date Noted POA   PRINCIPAL PROBLEM:  Atrial fibrillation with rapid ventricular response (I48.91) 09/05/2018 Yes   Ureteral stone (N20.1) 09/04/2018 Yes   Renal stone (N20.0) 09/04/2018 Yes   Essential hypertension (I10) 12/01/2015 Yes     Problems Resolved During this Admission:        Discharged Condition: stable     Disposition: Home or Self Care     Follow Up:  Follow-up Information       Fadi Damico MD In 5 days.   Specialty:  Internal Medicine  Contact information:  [address]  [place] [address]  [phone]            Ricky Baker IV, MD.   Specialty:  Urology  Why:  as scheduled   Contact information:  [address] [address]  [phone]        Ned Parker MD In 1 week.   Specialty:  Cardiovascular Disease  Contact information:  [address] [address]  [phone]                  Review of Systems   Constitutional: Negative for chills and fever.   HENT: Negative for ear pain and sore throat.    Respiratory: Negative for cough.    Cardiovascular: Negative for chest pain.   Gastrointestinal: Negative for abdominal pain and blood in stool.   Genitourinary: Negative for dysuria and hematuria.   Neurological: Negative for seizures and syncope.     Objective:   /76 (BP Location: Right arm, Patient Position: Sitting)   Pulse 63   Temp 97.3 °F (36.3 °C) (Tympanic)   Ht 5' 7" (1.702 m)   Wt 81.9 kg (180 lb 8.9 oz)   SpO2 98%   BMI 28.28 kg/m²     Physical Exam   Constitutional: He is oriented to person, place, and time. He appears well-developed and well-nourished. No distress.   HENT:   Head: Normocephalic and atraumatic.   Mouth/Throat: Oropharynx is clear and moist.   Eyes: EOM are normal. Pupils are equal, round, and reactive to light.   Neck: Neck supple. No thyromegaly present.   Cardiovascular: Normal rate.   irreg   Pulmonary/Chest: Breath sounds normal. He has no wheezes. He has no rales. "   Abdominal: Soft. Bowel sounds are normal. There is no tenderness.   Musculoskeletal: He exhibits no edema.   Lymphadenopathy:     He has no cervical adenopathy.   Neurological: He is alert and oriented to person, place, and time.   Skin: Skin is warm and dry.   Psychiatric: He has a normal mood and affect. His behavior is normal.       Assessment:     1. Atrial fibrillation with rapid ventricular response    2. Essential hypertension    3. Preventative health care    4. Need for diphtheria-tetanus-pertussis (Tdap) vaccine      Plan:   Atrial fibrillation with rapid ventricular response  Cont current meds    Essential hypertension  Stable    Preventative health care  -     Lipid panel; Future; Expected date: 10/17/2018  -     TSH; Future; Expected date: 10/17/2018  -     Hemoglobin A1c; Future; Expected date: 10/17/2018    Need for diphtheria-tetanus-pertussis (Tdap) vaccine  -     Tdap Vaccine        Lab Frequency Next Occurrence   Comprehensive metabolic panel Once 02/08/2018   Lipid panel Once 02/08/2018   TSH Once 02/08/2018   Hemoglobin A1c Once 02/08/2018   X-Ray Abdomen AP 1 View Once 09/05/2018   US Retroperitoneal Complete (Kidney and Once 09/05/2018       Problem List Items Addressed This Visit        Cardiac/Vascular    Essential hypertension    Atrial fibrillation with rapid ventricular response - Primary      Other Visit Diagnoses     Preventative health care        Relevant Orders    Lipid panel    TSH    Hemoglobin A1c    Need for diphtheria-tetanus-pertussis (Tdap) vaccine        Relevant Orders    Tdap Vaccine          Follow-up in about 7 weeks (around 10/24/2018), or if symptoms worsen or fail to improve.

## 2018-09-08 ENCOUNTER — PATIENT MESSAGE (OUTPATIENT)
Dept: UROLOGY | Facility: CLINIC | Age: 48
End: 2018-09-08

## 2018-09-10 ENCOUNTER — OFFICE VISIT (OUTPATIENT)
Dept: CARDIOLOGY | Facility: CLINIC | Age: 48
End: 2018-09-10
Payer: COMMERCIAL

## 2018-09-10 ENCOUNTER — PATIENT MESSAGE (OUTPATIENT)
Dept: UROLOGY | Facility: CLINIC | Age: 48
End: 2018-09-10

## 2018-09-10 VITALS
DIASTOLIC BLOOD PRESSURE: 85 MMHG | HEIGHT: 67 IN | WEIGHT: 178.81 LBS | SYSTOLIC BLOOD PRESSURE: 120 MMHG | BODY MASS INDEX: 28.07 KG/M2 | HEART RATE: 76 BPM

## 2018-09-10 DIAGNOSIS — I48.19 ATRIAL FIBRILLATION, PERSISTENT: Primary | Chronic | ICD-10-CM

## 2018-09-10 DIAGNOSIS — I10 ESSENTIAL HYPERTENSION: ICD-10-CM

## 2018-09-10 PROBLEM — G47.30 SLEEP APNEA: Status: RESOLVED | Noted: 2018-09-04 | Resolved: 2018-09-10

## 2018-09-10 PROCEDURE — 3079F DIAST BP 80-89 MM HG: CPT | Mod: CPTII,S$GLB,, | Performed by: INTERNAL MEDICINE

## 2018-09-10 PROCEDURE — 99999 PR PBB SHADOW E&M-EST. PATIENT-LVL III: CPT | Mod: PBBFAC,,, | Performed by: INTERNAL MEDICINE

## 2018-09-10 PROCEDURE — 3008F BODY MASS INDEX DOCD: CPT | Mod: CPTII,S$GLB,, | Performed by: INTERNAL MEDICINE

## 2018-09-10 PROCEDURE — 99215 OFFICE O/P EST HI 40 MIN: CPT | Mod: S$GLB,,, | Performed by: INTERNAL MEDICINE

## 2018-09-10 PROCEDURE — 3074F SYST BP LT 130 MM HG: CPT | Mod: CPTII,S$GLB,, | Performed by: INTERNAL MEDICINE

## 2018-09-10 NOTE — H&P (VIEW-ONLY)
Subjective:   Patient ID:  Renzo Salgado is a 48 y.o. male who presents for cardiac consult of Atrial Fibrillation and Hypertension      HPI  The patient came in today for cardiac consult of Atrial Fibrillation and Hypertension      9/10/18  This is a 48 year old male pt with AFib on Eliquis, HTN, renal stones presents for follow up CV evaluation and second opinion.    He recently had left ureteroscopy with laser lithotripsy and stone basket extraction and Cystoscopy with placement of left double-J ureteral stent 9/4/18 per Dr. Baker. Prior to surgery, the pt had Afib with RVR. Cardiology was consulted. Pt received IV Cardizem and was placed on a IV Cardizem drip. He underwent surgery and was admitted for uncontrolled Afib on IV Cardizem. Cardizem 120 mg po added to his regimen.     His cardiologist is Dr. Ned Parker with [facility]. Last Friday he saw Dr. Parker. He may get a DCCV. He had Afib since 2011.  He was also on Multaq but has been in Afib. He is here for a second opinion. Pt had stress test in 2011 which was negative.   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     Patient feels well, no specific complaints, no chest pain, no sob, no leg swelling, no PND, no palpitation, no dizziness, no syncope, no CNS symptoms.     Patient has fairly good exercise tolerance.    Patient is compliant with medications.    2D ECHO   CONCLUSIONS     1 - Concentric remodeling.     2 - Normal left ventricular systolic function (EF 55-60%).     3 - Normal left ventricular diastolic function.     4 - Normal right ventricular systolic function .     5 - The estimated PA systolic pressure is 31 mmHg.     6 - Mild tricuspid regurgitation.   This document has been electronically    SIGNED BY: William Ybarra MD On: 04/01/2016 16:23    Past Medical History:   Diagnosis Date    Atrial fibrillation     Hypertension             Past Surgical History:   Procedure Laterality Date    CYSTOSCOPY W/ URETERAL STENT PLACEMENT Left  9/4/2018    Procedure: CYSTOSCOPY, WITH URETERAL STENT INSERTION;  Surgeon: Ricky Baker IV, MD;  Location: [facility] OR;  Service: Urology;  Laterality: Left;    CYSTOSCOPY, WITH URETERAL STENT INSERTION Left 9/4/2018    Performed by Ricky Baker IV, MD at [facility] OR    EXTRACTION - STONE Left 9/4/2018    Performed by Ricky Baker IV, MD at [facility] OR    LASER LITHOTRIPSY Left 9/4/2018    Procedure: LITHOTRIPSY, USING LASER;  Surgeon: Ricky Baker IV, MD;  Location: [facility] OR;  Service: Urology;  Laterality: Left;    LITHOTRIPSY, USING LASER Left 9/4/2018    Performed by Ricky Baker IV, MD at [facility] OR    UPPER GASTROINTESTINAL ENDOSCOPY         Social History     Tobacco Use    Smoking status: Never Smoker    Smokeless tobacco: Never Used   Substance Use Topics    Alcohol use: No    Drug use: No       Family History   Problem Relation Age of Onset    Hypertension Mother     Melanoma Neg Hx     Psoriasis Neg Hx     Lupus Neg Hx           Medication List           Accurate as of 9/10/18  9:04 AM. If you have any questions, ask your nurse or doctor.               CONTINUE taking these medications    apixaban 5 mg Tab  Commonly known as:  ELIQUIS  Take 1 tablet (5 mg total) by mouth 2 (two) times daily. Restart tomorrow 9/6/18     diltiaZEM 120 MG Cp24  Commonly known as:  CARDIZEM CD  Take 1 capsule (120 mg total) by mouth once daily.     HYDROcodone-acetaminophen  mg per tablet  Commonly known as:  NORCO     lisinopril 20 MG tablet  Commonly known as:  PRINIVIL,ZESTRIL  Take 1 tablet (20 mg total) by mouth once daily.     metoprolol succinate 100 MG 24 hr tablet  Commonly known as:  TOPROL-XL            Review of Systems   Constitutional: Negative.    HENT: Negative.    Eyes: Negative.    Respiratory: Negative.  Negative for shortness of breath.    Cardiovascular: Negative.  Negative for chest pain and palpitations.   Gastrointestinal: Negative.    Genitourinary: Positive for hematuria.  "  Musculoskeletal: Negative.    Skin: Negative.    Neurological: Negative.    Endo/Heme/Allergies: Negative.    Psychiatric/Behavioral: Negative.    All 12 systems otherwise negative.      Wt Readings from Last 3 Encounters:   09/10/18 81.1 kg (178 lb 12.7 oz)   09/07/18 81.9 kg (180 lb 8.9 oz)   09/05/18 82.7 kg (182 lb 5.1 oz)     Temp Readings from Last 3 Encounters:   09/07/18 97.3 °F (36.3 °C) (Tympanic)   09/05/18 96.2 °F (35.7 °C)   07/09/18 98.7 °F (37.1 °C) (Tympanic)     BP Readings from Last 3 Encounters:   09/10/18 120/85   09/07/18 124/76   09/05/18 120/77     Pulse Readings from Last 3 Encounters:   09/10/18 76   09/07/18 63   09/05/18 74       /85 (BP Method: Large (Manual))   Pulse 76   Ht 5' 7" (1.702 m)   Wt 81.1 kg (178 lb 12.7 oz)   BMI 28.00 kg/m²     Objective:   Physical Exam   Constitutional: He is oriented to person, place, and time. He appears well-developed and well-nourished. No distress.   HENT:   Head: Normocephalic and atraumatic.   Nose: Nose normal.   Mouth/Throat: Oropharynx is clear and moist.   Eyes: Conjunctivae and EOM are normal. No scleral icterus.   Neck: Normal range of motion. Neck supple. No JVD present. No thyromegaly present.   Cardiovascular: Normal rate, S1 normal and S2 normal. An irregularly irregular rhythm present. Exam reveals no gallop, no S3, no S4 and no friction rub.   No murmur heard.  Pulmonary/Chest: Effort normal and breath sounds normal. No stridor. No respiratory distress. He has no wheezes. He has no rales. He exhibits no tenderness.   Abdominal: Soft. Bowel sounds are normal. He exhibits no distension and no mass. There is no tenderness. There is no rebound.   Genitourinary:   Genitourinary Comments: Deferred   Musculoskeletal: Normal range of motion. He exhibits no edema, tenderness or deformity.   Lymphadenopathy:     He has no cervical adenopathy.   Neurological: He is alert and oriented to person, place, and time. He exhibits normal " muscle tone. Coordination normal.   Skin: Skin is warm and dry. No rash noted. He is not diaphoretic. No erythema. No pallor.   Psychiatric: He has a normal mood and affect. His behavior is normal. Judgment and thought content normal.   Nursing note and vitals reviewed.      Lab Results   Component Value Date     09/05/2018    K 4.0 09/05/2018     09/05/2018    CO2 22 (L) 09/05/2018    BUN 14 09/05/2018    CREATININE 1.0 09/05/2018     (H) 09/05/2018    AST 14 09/05/2018    ALT 20 09/05/2018    ALBUMIN 3.7 09/05/2018    PROT 6.9 09/05/2018    BILITOT 1.0 09/05/2018    WBC 7.07 09/05/2018    HGB 15.3 09/05/2018    HCT 43.6 09/05/2018    MCV 90 09/05/2018     09/05/2018    TSH 2.151 01/18/2016    CHOL 153 01/18/2016    HDL 39 (L) 01/18/2016    LDLCALC 78.0 01/18/2016    TRIG 180 (H) 01/18/2016    BNP 85 04/01/2016     Assessment:      1. Atrial fibrillation, persistent    2. Essential hypertension        Plan:   1. AFib  - will schedule RICARDO/DCCV For Oct 5th  - ordered 2D ECHO  - cont BB and CCB   - cont Eliquis for AC    2. HTN  - cont meds      Thank you for allowing me to participate in this patient's care. Please do not hesitate to contact me with any questions or concerns. Consult note has been forwarded to the referral physician.

## 2018-09-10 NOTE — PROGRESS NOTES
Subjective:   Patient ID:  Renzo Salgado is a 48 y.o. male who presents for cardiac consult of Atrial Fibrillation and Hypertension      HPI  The patient came in today for cardiac consult of Atrial Fibrillation and Hypertension      9/10/18  This is a 48 year old male pt with AFib on Eliquis, HTN, renal stones presents for follow up CV evaluation and second opinion.    He recently had left ureteroscopy with laser lithotripsy and stone basket extraction and Cystoscopy with placement of left double-J ureteral stent 9/4/18 per Dr. Baker. Prior to surgery, the pt had Afib with RVR. Cardiology was consulted. Pt received IV Cardizem and was placed on a IV Cardizem drip. He underwent surgery and was admitted for uncontrolled Afib on IV Cardizem. Cardizem 120 mg po added to his regimen.     His cardiologist is Dr. Ned Parker with [facility]. Last Friday he saw Dr. Parker. He may get a DCCV. He had Afib since 2011.  He was also on Multaq but has been in Afib. He is here for a second opinion. Pt had stress test in 2011 which was negative.   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     Patient feels well, no specific complaints, no chest pain, no sob, no leg swelling, no PND, no palpitation, no dizziness, no syncope, no CNS symptoms.     Patient has fairly good exercise tolerance.    Patient is compliant with medications.    2D ECHO   CONCLUSIONS     1 - Concentric remodeling.     2 - Normal left ventricular systolic function (EF 55-60%).     3 - Normal left ventricular diastolic function.     4 - Normal right ventricular systolic function .     5 - The estimated PA systolic pressure is 31 mmHg.     6 - Mild tricuspid regurgitation.   This document has been electronically    SIGNED BY: William Ybarra MD On: 04/01/2016 16:23    Past Medical History:   Diagnosis Date    Atrial fibrillation     Hypertension             Past Surgical History:   Procedure Laterality Date    CYSTOSCOPY W/ URETERAL STENT PLACEMENT Left  9/4/2018    Procedure: CYSTOSCOPY, WITH URETERAL STENT INSERTION;  Surgeon: Ricky Baker IV, MD;  Location: [facility] OR;  Service: Urology;  Laterality: Left;    CYSTOSCOPY, WITH URETERAL STENT INSERTION Left 9/4/2018    Performed by Ricky Bkaer IV, MD at [facility] OR    EXTRACTION - STONE Left 9/4/2018    Performed by Ricky Baker IV, MD at [facility] OR    LASER LITHOTRIPSY Left 9/4/2018    Procedure: LITHOTRIPSY, USING LASER;  Surgeon: Ricky Baker IV, MD;  Location: [facility] OR;  Service: Urology;  Laterality: Left;    LITHOTRIPSY, USING LASER Left 9/4/2018    Performed by Ricky Baker IV, MD at [facility] OR    UPPER GASTROINTESTINAL ENDOSCOPY         Social History     Tobacco Use    Smoking status: Never Smoker    Smokeless tobacco: Never Used   Substance Use Topics    Alcohol use: No    Drug use: No       Family History   Problem Relation Age of Onset    Hypertension Mother     Melanoma Neg Hx     Psoriasis Neg Hx     Lupus Neg Hx           Medication List           Accurate as of 9/10/18  9:04 AM. If you have any questions, ask your nurse or doctor.               CONTINUE taking these medications    apixaban 5 mg Tab  Commonly known as:  ELIQUIS  Take 1 tablet (5 mg total) by mouth 2 (two) times daily. Restart tomorrow 9/6/18     diltiaZEM 120 MG Cp24  Commonly known as:  CARDIZEM CD  Take 1 capsule (120 mg total) by mouth once daily.     HYDROcodone-acetaminophen  mg per tablet  Commonly known as:  NORCO     lisinopril 20 MG tablet  Commonly known as:  PRINIVIL,ZESTRIL  Take 1 tablet (20 mg total) by mouth once daily.     metoprolol succinate 100 MG 24 hr tablet  Commonly known as:  TOPROL-XL            Review of Systems   Constitutional: Negative.    HENT: Negative.    Eyes: Negative.    Respiratory: Negative.  Negative for shortness of breath.    Cardiovascular: Negative.  Negative for chest pain and palpitations.   Gastrointestinal: Negative.    Genitourinary: Positive for hematuria.  "  Musculoskeletal: Negative.    Skin: Negative.    Neurological: Negative.    Endo/Heme/Allergies: Negative.    Psychiatric/Behavioral: Negative.    All 12 systems otherwise negative.      Wt Readings from Last 3 Encounters:   09/10/18 81.1 kg (178 lb 12.7 oz)   09/07/18 81.9 kg (180 lb 8.9 oz)   09/05/18 82.7 kg (182 lb 5.1 oz)     Temp Readings from Last 3 Encounters:   09/07/18 97.3 °F (36.3 °C) (Tympanic)   09/05/18 96.2 °F (35.7 °C)   07/09/18 98.7 °F (37.1 °C) (Tympanic)     BP Readings from Last 3 Encounters:   09/10/18 120/85   09/07/18 124/76   09/05/18 120/77     Pulse Readings from Last 3 Encounters:   09/10/18 76   09/07/18 63   09/05/18 74       /85 (BP Method: Large (Manual))   Pulse 76   Ht 5' 7" (1.702 m)   Wt 81.1 kg (178 lb 12.7 oz)   BMI 28.00 kg/m²     Objective:   Physical Exam   Constitutional: He is oriented to person, place, and time. He appears well-developed and well-nourished. No distress.   HENT:   Head: Normocephalic and atraumatic.   Nose: Nose normal.   Mouth/Throat: Oropharynx is clear and moist.   Eyes: Conjunctivae and EOM are normal. No scleral icterus.   Neck: Normal range of motion. Neck supple. No JVD present. No thyromegaly present.   Cardiovascular: Normal rate, S1 normal and S2 normal. An irregularly irregular rhythm present. Exam reveals no gallop, no S3, no S4 and no friction rub.   No murmur heard.  Pulmonary/Chest: Effort normal and breath sounds normal. No stridor. No respiratory distress. He has no wheezes. He has no rales. He exhibits no tenderness.   Abdominal: Soft. Bowel sounds are normal. He exhibits no distension and no mass. There is no tenderness. There is no rebound.   Genitourinary:   Genitourinary Comments: Deferred   Musculoskeletal: Normal range of motion. He exhibits no edema, tenderness or deformity.   Lymphadenopathy:     He has no cervical adenopathy.   Neurological: He is alert and oriented to person, place, and time. He exhibits normal " muscle tone. Coordination normal.   Skin: Skin is warm and dry. No rash noted. He is not diaphoretic. No erythema. No pallor.   Psychiatric: He has a normal mood and affect. His behavior is normal. Judgment and thought content normal.   Nursing note and vitals reviewed.      Lab Results   Component Value Date     09/05/2018    K 4.0 09/05/2018     09/05/2018    CO2 22 (L) 09/05/2018    BUN 14 09/05/2018    CREATININE 1.0 09/05/2018     (H) 09/05/2018    AST 14 09/05/2018    ALT 20 09/05/2018    ALBUMIN 3.7 09/05/2018    PROT 6.9 09/05/2018    BILITOT 1.0 09/05/2018    WBC 7.07 09/05/2018    HGB 15.3 09/05/2018    HCT 43.6 09/05/2018    MCV 90 09/05/2018     09/05/2018    TSH 2.151 01/18/2016    CHOL 153 01/18/2016    HDL 39 (L) 01/18/2016    LDLCALC 78.0 01/18/2016    TRIG 180 (H) 01/18/2016    BNP 85 04/01/2016     Assessment:      1. Atrial fibrillation, persistent    2. Essential hypertension        Plan:   1. AFib  - will schedule RICARDO/DCCV For Oct 5th  - ordered 2D ECHO  - cont BB and CCB   - cont Eliquis for AC    2. HTN  - cont meds      Thank you for allowing me to participate in this patient's care. Please do not hesitate to contact me with any questions or concerns. Consult note has been forwarded to the referral physician.

## 2018-09-10 NOTE — PATIENT INSTRUCTIONS
Understanding Atrial Fibrillation    An arrhythmia is any problem with the speed or pattern of the heartbeat. Atrial fibrillation (AFib) is a common type of arrhythmia. It causes fast, chaotic electrical signals in the atria. This leads to poor functioning of the heart. It also affects how much blood your heart can pump out to the body.  Afib may occur once in a while and go away on its own. Or it may continue for longer periods and need treatment.  AFib can lead to serious problems, such as stroke. Your healthcare provider will need to monitor and manage it.  What happens during atrial fibrillation?   The heart has an electrical system that sends signals to control the heartbeat. As signals move through the heart, they tell the hearts upper chambers (atria) and lower chambers (ventricles) when to squeeze (contract) and relax. This lets blood move through the heart and out to the body and lungs.  With AFib, the atria receive abnormal signals. This causes them to contract in a fast and irregular way, and out of sync with the ventricles. When this happens, the atria also have a harder time moving blood into the ventricles. Blood may then pool in the atria, which increases the risk for blood clots and stroke. The ventricles also may contract too quickly and irregularly. As a result, they may not pump blood to the body and lungs as well as they should. This can weaken the heart muscle over time and cause heart failure.  What causes atrial fibrillation?  AFib is more common in older adults. It has many possible causes including:  · Coronary artery disease  · Heart valve disease  · Heart attack  · Heart surgery  · High blood pressure  · Thyroid disease  · Diabetes  · Lung disease  · Sleep apnea  · Heavy alcohol use  In some cases of AFib, doctors do not know the cause.  What are the symptoms of atrial fibrillation?  AFib may or may not cause symptoms. If symptoms do occur, they may include:  · A fast, pounding,  irregular heartbeat  · Shortness of breath  · Tiredness  · Dizziness or fainting  · Chest pain  How is atrial fibrillation treated?  Treatments for AFib can include any of the options below.  · Medicines. You may be prescribed:  ? Heart rate medicines to help slow down the heartbeat  ? Heart rhythm medicines to help the heart beat more regularly  ? Anti-clotting medicines to help reduce the risk for blood clots and stroke  · Electrical cardioversion. Your healthcare provider uses special pads or paddles to send one or more brief electrical shocks to the heart. This can help reset the heartbeat to normal.  · Ablation. Long, thin tubes called catheters are threaded through a blood vessel to the heart. There, the catheters send out hot or cold energy to the areas causing the abnormal signals. This energy destroys the problem tissue or cells. This improves the chances that your heart will stay in normal rhythm without using medicines. If your heart rate and rhythm cant be controlled, you may need ablation and a pacemaker. These will help control the heart rate and regularity of the heartbeat.  · Surgery. During surgery, your healthcare provider may use different methods to create scar tissue in the areas of the heart causing the abnormal signals. The scar tissue disrupts the abnormal signals and may stop AFib from occurring.  What are the complications of atrial fibrillation?  These can include:  · Blood clots  · Stroke  · Heart failure. This problem occurs when the heart muscle weakens so much that it can no longer pump blood well.  When should I call my healthcare provider?  Call your healthcare provider right away if you have any of these:  · Symptoms that dont get better with treatment, or get worse  · New symptoms   Date Last Reviewed: 5/1/2016  © 5998-2886 [employer]. [address] [place] [address], [place] [address]. All rights reserved. This information is not intended as a substitute for professional  medical care. Always follow your healthcare professional's instructions.

## 2018-09-20 ENCOUNTER — CLINICAL SUPPORT (OUTPATIENT)
Dept: CARDIOLOGY | Facility: CLINIC | Age: 48
End: 2018-09-20
Attending: INTERNAL MEDICINE
Payer: COMMERCIAL

## 2018-09-20 DIAGNOSIS — I48.19 ATRIAL FIBRILLATION, PERSISTENT: Chronic | ICD-10-CM

## 2018-09-20 DIAGNOSIS — I10 ESSENTIAL HYPERTENSION: ICD-10-CM

## 2018-09-20 LAB
DIASTOLIC DYSFUNCTION: NO
ESTIMATED PA SYSTOLIC PRESSURE: 24.72
MITRAL VALVE REGURGITATION: NORMAL
RETIRED EF AND QEF - SEE NOTES: 55 (ref 55–65)
TRICUSPID VALVE REGURGITATION: NORMAL

## 2018-09-20 PROCEDURE — 93306 TTE W/DOPPLER COMPLETE: CPT | Mod: S$GLB,,, | Performed by: INTERNAL MEDICINE

## 2018-09-26 ENCOUNTER — HOSPITAL ENCOUNTER (OUTPATIENT)
Dept: RADIOLOGY | Facility: HOSPITAL | Age: 48
Discharge: HOME | End: 2018-09-26
Attending: UROLOGY
Payer: COMMERCIAL

## 2018-09-26 DIAGNOSIS — N20.0 KIDNEY STONES: ICD-10-CM

## 2018-09-26 PROCEDURE — 76770 US EXAM ABDO BACK WALL COMP: CPT | Mod: TC,PO

## 2018-09-26 PROCEDURE — 74018 RADEX ABDOMEN 1 VIEW: CPT | Mod: TC,FY,PO

## 2018-09-26 PROCEDURE — 74018 RADEX ABDOMEN 1 VIEW: CPT | Mod: 26,,, | Performed by: RADIOLOGY

## 2018-09-26 PROCEDURE — 76770 US EXAM ABDO BACK WALL COMP: CPT | Mod: 26,,, | Performed by: RADIOLOGY

## 2018-10-03 ENCOUNTER — OFFICE VISIT (OUTPATIENT)
Dept: UROLOGY | Facility: CLINIC | Age: 48
End: 2018-10-03
Payer: COMMERCIAL

## 2018-10-03 VITALS
HEIGHT: 67 IN | DIASTOLIC BLOOD PRESSURE: 72 MMHG | SYSTOLIC BLOOD PRESSURE: 116 MMHG | BODY MASS INDEX: 28.07 KG/M2 | WEIGHT: 178.81 LBS

## 2018-10-03 DIAGNOSIS — N20.1 URETERAL STONE: Primary | ICD-10-CM

## 2018-10-03 PROCEDURE — 99999 PR PBB SHADOW E&M-EST. PATIENT-LVL III: CPT | Mod: PBBFAC,,, | Performed by: UROLOGY

## 2018-10-03 PROCEDURE — 99024 POSTOP FOLLOW-UP VISIT: CPT | Mod: S$GLB,,, | Performed by: UROLOGY

## 2018-10-03 NOTE — PROGRESS NOTES
Chief Complaint: Hematuria/Kidney Stones    HPI:   10/3/18: Left URS completed and stent is out.  KUB/US reassuring there is a small cyst on left kidney of no concern.  8/27/18: 47 yo man on eliquis saw gross hematuria last month, had a KUB that shows two 5-6mm left renal stones.  He went to Dr. Myers and a CT was done that shows the same stones.  Was scheduled for ESWL and his HR went to 130 during the procedure.  Was sent away for cardiology clearance that was given by cardiologist with low risk.  Happened again at 120 then 80-85 HR and Dr. Myers did not want to schedule it a third time.  Seems no anesthesia was given for the procedures.  ESWL was never started.  No abd/pelvic pain and no exac/rel factors.  No hematuria.  No urolithiasis.  No urinary bother.  No  history.  Normal sexual function.  Referred by Dr. Damico.    Allergies:  Shellfish containing products    Medications:  has a current medication list which includes the following prescription(s): apixaban, diltiazem, hydrocodone-acetaminophen, lisinopril, and metoprolol succinate.    Review of Systems:  General: No fever, chills, fatigability, or weight loss.  Skin: No rashes, itching, or changes in color or texture of skin.  Chest: Denies HIGGINBOTHAM, cyanosis, wheezing, cough, and sputum production.  Abdomen: Appetite fine. No weight loss. Denies diarrhea, abdominal pain, hematemesis, or blood in stool.  Musculoskeletal: No joint stiffness or swelling. Denies back pain.  : As above.  All other review of systems negative.    PMH:   has a past medical history of Atrial fibrillation, Hypertension, and Sleep apnea.    PSH:   has a past surgical history that includes Upper gastrointestinal endoscopy; LITHOTRIPSY, USING LASER (Left, 9/4/2018); EXTRACTION - STONE (Left, 9/4/2018); and CYSTOSCOPY, WITH URETERAL STENT INSERTION (Left, 9/4/2018).    FamHx: family history includes Hypertension in his mother.    SocHx:  reports that  has never smoked. he has never  used smokeless tobacco. He reports that he does not drink alcohol or use drugs.      Physical Exam:  Vitals:    10/03/18 1049   BP: 116/72     General: A&Ox3, no apparent distress, no deformities  Neck: No masses, normal thyroid  Lungs: normal inspiration, no use of accessory muscles  Heart: normal pulse, no arrhythmias  Abdomen: Soft, NT, ND  Skin: The skin is warm and dry. No jaundice.  Ext: No c/c/e.  :   8/18: Test desc alejandro, no abnormalities of epididymus. Penis normal, with normal penile and scrotal skin. Meatus normal.     Labs/Studies: Urinalysis performed in clinic, summary: UA normal exc 50 blood    Impression/Plan:   1. KUB/US/RTC 6 mo, doing well.

## 2018-10-05 ENCOUNTER — ANESTHESIA EVENT (OUTPATIENT)
Dept: CARDIOLOGY | Facility: HOSPITAL | Age: 48
End: 2018-10-05
Payer: COMMERCIAL

## 2018-10-05 ENCOUNTER — ANESTHESIA (OUTPATIENT)
Dept: CARDIOLOGY | Facility: HOSPITAL | Age: 48
End: 2018-10-05
Payer: COMMERCIAL

## 2018-10-05 ENCOUNTER — HOSPITAL ENCOUNTER (OUTPATIENT)
Facility: HOSPITAL | Age: 48
Discharge: HOME | End: 2018-10-05
Attending: INTERNAL MEDICINE | Admitting: INTERNAL MEDICINE
Payer: COMMERCIAL

## 2018-10-05 VITALS
RESPIRATION RATE: 20 BRPM | HEART RATE: 78 BPM | HEIGHT: 67 IN | OXYGEN SATURATION: 99 % | TEMPERATURE: 98 F | DIASTOLIC BLOOD PRESSURE: 89 MMHG | BODY MASS INDEX: 27.94 KG/M2 | WEIGHT: 178 LBS | SYSTOLIC BLOOD PRESSURE: 117 MMHG

## 2018-10-05 DIAGNOSIS — I10 ESSENTIAL HYPERTENSION: ICD-10-CM

## 2018-10-05 DIAGNOSIS — I48.0 PAF (PAROXYSMAL ATRIAL FIBRILLATION): ICD-10-CM

## 2018-10-05 DIAGNOSIS — I48.91 A-FIB: ICD-10-CM

## 2018-10-05 DIAGNOSIS — I48.19 ATRIAL FIBRILLATION, PERSISTENT: Chronic | ICD-10-CM

## 2018-10-05 LAB
MITRAL VALVE MOBILITY: NORMAL
MITRAL VALVE REGURGITATION: NORMAL
RETIRED EF AND QEF - SEE NOTES: 55 (ref 55–65)
TRICUSPID VALVE REGURGITATION: NORMAL

## 2018-10-05 PROCEDURE — 25000003 PHARM REV CODE 250: Performed by: NURSE ANESTHETIST, CERTIFIED REGISTERED

## 2018-10-05 PROCEDURE — 25000003 PHARM REV CODE 250

## 2018-10-05 PROCEDURE — 37000008 HC ANESTHESIA 1ST 15 MINUTES: Performed by: INTERNAL MEDICINE

## 2018-10-05 PROCEDURE — 93320 DOPPLER ECHO COMPLETE: CPT | Mod: 26,,, | Performed by: INTERNAL MEDICINE

## 2018-10-05 PROCEDURE — 63600175 PHARM REV CODE 636 W HCPCS

## 2018-10-05 PROCEDURE — 93010 ELECTROCARDIOGRAM REPORT: CPT | Mod: 76,59,, | Performed by: INTERNAL MEDICINE

## 2018-10-05 PROCEDURE — 93312 ECHO TRANSESOPHAGEAL: CPT | Mod: 26,,, | Performed by: INTERNAL MEDICINE

## 2018-10-05 PROCEDURE — 93005 ELECTROCARDIOGRAM TRACING: CPT

## 2018-10-05 PROCEDURE — 92960 CARDIOVERSION ELECTRIC EXT: CPT

## 2018-10-05 PROCEDURE — 93325 DOPPLER ECHO COLOR FLOW MAPG: CPT | Performed by: INTERNAL MEDICINE

## 2018-10-05 PROCEDURE — 93325 DOPPLER ECHO COLOR FLOW MAPG: CPT | Mod: 26,,, | Performed by: INTERNAL MEDICINE

## 2018-10-05 PROCEDURE — 63600175 PHARM REV CODE 636 W HCPCS: Performed by: NURSE ANESTHETIST, CERTIFIED REGISTERED

## 2018-10-05 PROCEDURE — 93312 ECHO TRANSESOPHAGEAL: CPT | Performed by: INTERNAL MEDICINE

## 2018-10-05 PROCEDURE — 92960 CARDIOVERSION ELECTRIC EXT: CPT | Mod: ,,, | Performed by: INTERNAL MEDICINE

## 2018-10-05 PROCEDURE — 37000009 HC ANESTHESIA EA ADD 15 MINS: Performed by: INTERNAL MEDICINE

## 2018-10-05 PROCEDURE — 93320 DOPPLER ECHO COMPLETE: CPT | Performed by: INTERNAL MEDICINE

## 2018-10-05 RX ORDER — METOPROLOL SUCCINATE 100 MG/1
100 TABLET, EXTENDED RELEASE ORAL 2 TIMES DAILY
Qty: 90 TABLET | Refills: 3 | Status: SHIPPED | OUTPATIENT
Start: 2018-10-05 | End: 2019-08-14 | Stop reason: SDUPTHER

## 2018-10-05 RX ORDER — LIDOCAINE HCL/PF 100 MG/5ML
SYRINGE (ML) INTRAVENOUS
Status: DISCONTINUED | OUTPATIENT
Start: 2018-10-05 | End: 2018-10-05

## 2018-10-05 RX ORDER — DILTIAZEM HYDROCHLORIDE 120 MG/1
120 CAPSULE, COATED, EXTENDED RELEASE ORAL DAILY
Qty: 90 CAPSULE | Refills: 3 | Status: SHIPPED | OUTPATIENT
Start: 2018-10-05 | End: 2019-09-12 | Stop reason: SDUPTHER

## 2018-10-05 RX ORDER — PROPOFOL 10 MG/ML
INJECTION, EMULSION INTRAVENOUS
Status: DISCONTINUED | OUTPATIENT
Start: 2018-10-05 | End: 2018-10-05

## 2018-10-05 RX ORDER — LISINOPRIL 20 MG/1
20 TABLET ORAL DAILY
Qty: 30 TABLET | Refills: 6 | Status: SHIPPED | OUTPATIENT
Start: 2018-10-05 | End: 2019-05-21

## 2018-10-05 RX ORDER — SODIUM CHLORIDE, SODIUM LACTATE, POTASSIUM CHLORIDE, CALCIUM CHLORIDE 600; 310; 30; 20 MG/100ML; MG/100ML; MG/100ML; MG/100ML
INJECTION, SOLUTION INTRAVENOUS CONTINUOUS PRN
Status: DISCONTINUED | OUTPATIENT
Start: 2018-10-05 | End: 2018-10-05

## 2018-10-05 RX ADMIN — LIDOCAINE HYDROCHLORIDE 60 MG: 20 INJECTION, SOLUTION INTRAVENOUS at 08:10

## 2018-10-05 RX ADMIN — PROPOFOL 20 MG: 10 INJECTION, EMULSION INTRAVENOUS at 08:10

## 2018-10-05 RX ADMIN — LIDOCAINE HYDROCHLORIDE 40 MG: 20 INJECTION, SOLUTION INTRAVENOUS at 08:10

## 2018-10-05 RX ADMIN — PROPOFOL 30 MG: 10 INJECTION, EMULSION INTRAVENOUS at 08:10

## 2018-10-05 RX ADMIN — PROPOFOL 50 MG: 10 INJECTION, EMULSION INTRAVENOUS at 08:10

## 2018-10-05 RX ADMIN — PROPOFOL 40 MG: 10 INJECTION, EMULSION INTRAVENOUS at 08:10

## 2018-10-05 RX ADMIN — SODIUM CHLORIDE, SODIUM LACTATE, POTASSIUM CHLORIDE, AND CALCIUM CHLORIDE: 600; 310; 30; 20 INJECTION, SOLUTION INTRAVENOUS at 08:10

## 2018-10-05 RX ADMIN — PROPOFOL 60 MG: 10 INJECTION, EMULSION INTRAVENOUS at 08:10

## 2018-10-05 NOTE — ANESTHESIA PREPROCEDURE EVALUATION
10/05/2018  Renzo Salgado is a 48 y.o., male.    Pre-op Assessment    I have reviewed the Patient Summary Reports.     I have reviewed the Nursing Notes.      Review of Systems  Anesthesia Hx:  No problems with previous Anesthesia Pt is noted to have gone into afibb with RVR (130-180s) with a prior urological procedure per Dr Baker.  This procedure was at the [facility].   History of prior surgery of interest to airway management or planning: Previous anesthesia: General, MAC Denies Family Hx of Anesthesia complications.   Denies Personal Hx of Anesthesia complications.   Social:  Non-Smoker, No Alcohol Use    Hematology/Oncology:  Hematology Normal   Oncology Normal     EENT/Dental:EENT/Dental Normal   Cardiovascular:   Hypertension Dysrhythmias atrial fibrillation ECG has been reviewed. CONCLUSIONS     1 - Concentric remodeling.     2 - Normal left ventricular systolic function (EF 55-60%).     3 - Normal left ventricular diastolic function.     4 - Normal right ventricular systolic function .     5 - The estimated PA systolic pressure is 31 mmHg.     6 - Mild tricuspid regurgitation.   Pulmonary:   Sleep Apnea    Renal/:   Chronic Renal Disease renal calculi    Musculoskeletal:  Musculoskeletal Normal    Neurological:  Neurology Normal    Endocrine:  Endocrine Normal    Dermatological:  Skin Normal    Psych:  Psychiatric Normal           Physical Exam  General:  Obesity    Airway/Jaw/Neck:  Airway Findings: Mouth Opening: Normal Mallampati: II  Jaw/Neck Findings:  Neck ROM: Normal ROM      Dental:  Dental Findings: In tact   Chest/Lungs:  Chest/Lungs Findings: Normal Respiratory Rate, Clear to auscultation     Heart/Vascular:  Heart Findings: Rate: Normal  Rhythm: Regular Rhythm        Mental Status:  Mental Status Findings:  Alert and Oriented, Cooperative         Anesthesia Plan  Type of  Anesthesia, risks & benefits discussed:  Anesthesia Type:  MAC  Patient's Preference:   Intra-op Monitoring Plan: standard ASA monitors  Intra-op Monitoring Plan Comments:   Post Op Pain Control Plan: per primary service following discharge from PACU  Post Op Pain Control Plan Comments:   Induction:   IV  Beta Blocker:  Patient is on a Beta-Blocker and has received one dose within the past 24 hours (No further documentation required).       Informed Consent: Patient understands risks and agrees with Anesthesia plan.  Questions answered. Anesthesia consent signed with patient.  ASA Score: 2     Day of Surgery Review of History & Physical:  There are no significant changes.

## 2018-10-05 NOTE — INTERVAL H&P NOTE
The patient has been examined and the H&P has been reviewed:    I concur with the findings and no changes have occurred since H&P was written.    Anesthesia/Surgery risks, benefits and alternative options discussed and understood by patient/family.    RICARDO/DCCV today      Active Hospital Problems    Diagnosis  POA    PAF (paroxysmal atrial fibrillation) [I48.0]  Yes     Priority: High      Resolved Hospital Problems   No resolved problems to display.

## 2018-10-05 NOTE — ANESTHESIA POSTPROCEDURE EVALUATION
"Anesthesia Post Evaluation    Patient: Renzo Salgado    Procedure(s) Performed: Procedure(s) (LRB):  ECHOCARDIOGRAM,TRANSESOPHAGEAL (N/A)    Final Anesthesia Type: MAC  Patient participation: Yes- Able to Participate  Level of consciousness: awake and alert  Post-procedure vital signs: reviewed and stable  Pain management: adequate  Airway patency: patent  PONV status at discharge: No PONV  Anesthetic complications: no      Cardiovascular status: blood pressure returned to baseline and hemodynamically stable  Respiratory status: unassisted and spontaneous ventilation  Hydration status: euvolemic  Follow-up not needed.        Visit Vitals  /89 (BP Location: Left arm, Patient Position: Lying)   Pulse 78   Temp 36.5 °C (97.7 °F) (Oral)   Resp 20   Ht 5' 7" (1.702 m)   Wt 80.7 kg (178 lb)   SpO2 99%   BMI 27.88 kg/m²       Pain/Deisi Score: Pain Assessment Performed: Yes (10/5/2018  8:00 AM)  Presence of Pain: denies (10/5/2018  8:00 AM)  Pain Rating Prior to Med Admin: 0 (10/5/2018  8:00 AM)  Pain Rating Post Med Admin: 0 (10/5/2018  8:00 AM)        "

## 2018-10-05 NOTE — BRIEF OP NOTE
[facility] Hospital Medicine  Discharge Summary      Patient Name: Renzo Salgado  MRN: [id number]  Admission Date: 10/5/2018  Hospital Length of Stay: 0 days  Discharge Date and Time:  10/05/2018 9:05 AM  Attending Physician: Abundio Alvarado MD   Discharging Provider: Abundio Alvarado Md, MD  Primary Care Provider: Fadi Damico MD    Hospital Course:     RICARDO/DCCV    Surgeon/Physician: Abundio Alvarado MD     Pre Op Diagnosis: Afib r/o thrombus    Post OP Diagnosis: No RAMESH thrombus    Procedure Performed: Transesophageal echocardiogram     Procedure Description: The risks, benefits, and alternatives of the procedure expalined in detail with patient and family. The patient voices understanding and all questions have been addressed. The patient agrees to proceed as planned.   The patient was sedated by anesthesiologist. The probe was advanced via throat into esophagus smoothly. The patient tolerated the procedure well and there was no complications. The probed was withdrawal at the end of study. The patient was hemodynamically stable.     Estimated Blood Loss: none.     Findings / Operative Note:   No RAMESH thrombus noted.    Successful DCCV to NSR.     Ok to discharge to home once hemodynamically stable.    F/U with me in one to two weeks at cardiology clinic.      Procedure(s) (LRB):  ECHOCARDIOGRAM,TRANSESOPHAGEAL (N/A)        Final Active Diagnoses:    Diagnosis Date Noted POA    PAF (paroxysmal atrial fibrillation) [I48.0] 10/05/2018 Yes      Problems Resolved During this Admission:      Discharged Condition: good    Disposition:     Follow Up:  Follow-up Information     Abundio Alvarado Md, MD In 2 weeks.    Specialties:  Cardiology, Internal Medicine  Contact information:  [address]  [place] [address] [phone]                 Patient Instructions:   No discharge procedures on file.  Medications:  Reconciled Home Medications:      Medication List      CHANGE how you take these medications     apixaban 5 mg Tab  Commonly known as:  ELIQUIS  Take 1 tablet (5 mg total) by mouth 2 (two) times daily.  What changed:  additional instructions        CONTINUE taking these medications    diltiaZEM 120 MG Cp24  Commonly known as:  CARDIZEM CD  Take 1 capsule (120 mg total) by mouth once daily.     HYDROcodone-acetaminophen  mg per tablet  Commonly known as:  NORCO  TK 1 T PO Q 4 H PRN P     lisinopril 20 MG tablet  Commonly known as:  PRINIVIL,ZESTRIL  Take 1 tablet (20 mg total) by mouth once daily.     metoprolol succinate 100 MG 24 hr tablet  Commonly known as:  TOPROL-XL  Take 1 tablet (100 mg total) by mouth 2 (two) times daily.              Pending Diagnostic Studies:     Procedure Component Value Units Date/Time    EKG 12-lead [[id number]]     Order Status:  Sent Lab Status:  No result         Indwelling Lines/Drains at time of discharge:   Lines/Drains/Airways     Airway                 Airway - Non-Surgical 10/05/18 0849 Nasal Cannula less than 1 day                Time spent on the discharge of patient: 35 minutes  Patient was seen and examined on the date of discharge and determined to be suitable for discharge.         Abundio Alvarado Md, MD  Department of Hospital Medicine  [facility]

## 2018-10-05 NOTE — TRANSFER OF CARE
"Anesthesia Transfer of Care Note    Patient: Renzo Salgado    Procedure(s) Performed: Procedure(s) (LRB):  ECHOCARDIOGRAM,TRANSESOPHAGEAL (N/A)    Patient location: Other: CVRU    Anesthesia Type: MAC    Transport from OR: Transported from OR on 2-3 L/min O2 by NC with adequate spontaneous ventilation    Post pain: adequate analgesia    Post assessment: no apparent anesthetic complications and tolerated procedure well    Post vital signs: stable    Level of consciousness: responds to stimulation    Nausea/Vomiting: no nausea/vomiting    Complications: none    Transfer of care protocol was followed      Last vitals:   Visit Vitals  /89 (BP Location: Left arm, Patient Position: Lying)   Pulse 78   Temp 36.5 °C (97.7 °F) (Oral)   Resp 20   Ht 5' 7" (1.702 m)   Wt 80.7 kg (178 lb)   SpO2 99%   BMI 27.88 kg/m²     "

## 2018-10-05 NOTE — PLAN OF CARE
Discharge orders received and reviewed with patient. Follow up appointment scheduled. IV removed, monitors removed, pt assisted with dressing and wheeled to front for discharge home with family.

## 2018-10-18 ENCOUNTER — CLINICAL SUPPORT (OUTPATIENT)
Dept: CARDIOLOGY | Facility: CLINIC | Age: 48
End: 2018-10-18
Payer: COMMERCIAL

## 2018-10-18 ENCOUNTER — OFFICE VISIT (OUTPATIENT)
Dept: CARDIOLOGY | Facility: CLINIC | Age: 48
End: 2018-10-18
Payer: COMMERCIAL

## 2018-10-18 VITALS
SYSTOLIC BLOOD PRESSURE: 120 MMHG | HEIGHT: 67 IN | DIASTOLIC BLOOD PRESSURE: 82 MMHG | WEIGHT: 179.25 LBS | BODY MASS INDEX: 28.13 KG/M2 | HEART RATE: 76 BPM

## 2018-10-18 DIAGNOSIS — G47.39 OTHER SLEEP APNEA: ICD-10-CM

## 2018-10-18 DIAGNOSIS — I48.0 PAF (PAROXYSMAL ATRIAL FIBRILLATION): Primary | ICD-10-CM

## 2018-10-18 DIAGNOSIS — I48.0 PAF (PAROXYSMAL ATRIAL FIBRILLATION): ICD-10-CM

## 2018-10-18 DIAGNOSIS — I10 ESSENTIAL HYPERTENSION: ICD-10-CM

## 2018-10-18 PROBLEM — I48.91 ATRIAL FIBRILLATION WITH RAPID VENTRICULAR RESPONSE: Status: RESOLVED | Noted: 2018-09-05 | Resolved: 2018-10-18

## 2018-10-18 PROCEDURE — 93000 ELECTROCARDIOGRAM COMPLETE: CPT | Mod: S$GLB,,, | Performed by: INTERNAL MEDICINE

## 2018-10-18 PROCEDURE — 3008F BODY MASS INDEX DOCD: CPT | Mod: CPTII,S$GLB,, | Performed by: INTERNAL MEDICINE

## 2018-10-18 PROCEDURE — 3074F SYST BP LT 130 MM HG: CPT | Mod: CPTII,S$GLB,, | Performed by: INTERNAL MEDICINE

## 2018-10-18 PROCEDURE — 99999 PR PBB SHADOW E&M-EST. PATIENT-LVL III: CPT | Mod: PBBFAC,,, | Performed by: INTERNAL MEDICINE

## 2018-10-18 PROCEDURE — 3079F DIAST BP 80-89 MM HG: CPT | Mod: CPTII,S$GLB,, | Performed by: INTERNAL MEDICINE

## 2018-10-18 PROCEDURE — 99214 OFFICE O/P EST MOD 30 MIN: CPT | Mod: S$GLB,,, | Performed by: INTERNAL MEDICINE

## 2018-10-18 NOTE — PROGRESS NOTES
Subjective:   Patient ID:  Renzo Salgado is a 48 y.o. male who presents for cardiac consult of Hypertension and Atrial Fibrillation      HPI  The patient came in today for cardiac consult of Hypertension and Atrial Fibrillation    This is a 48 year old male pt with PAFib on Eliquis, HTN, renal stones presents for follow up CV evaluation.     9/10/18  He recently had left ureteroscopy with laser lithotripsy and stone basket extraction and Cystoscopy with placement of left double-J ureteral stent 9/4/18 per Dr. Baker. Prior to surgery, the pt had Afib with RVR. Cardiology was consulted. Pt received IV Cardizem and was placed on a IV Cardizem drip. He underwent surgery and was admitted for uncontrolled Afib on IV Cardizem. Cardizem 120 mg po added to his regimen.     His cardiologist is Dr. Ned Parker with [facility]. Last Friday he saw Dr. Parker. He may get a DCCV. He had Afib since 2011.  He was also on Multaq but has been in Afib. He is here for a second opinion. Pt had stress test in 2011 which was negative.   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     10/18/18  Pt had RICARDO/DCCV last week which was successful to convert to NSR. Unfortunately he is back in Afib, rate controlled at 80 bpm. Walks everyday without issues. He feels well today. Will refer to sleep apnea. Pt does snore, nocturia, fatigue at times.   ECG- AFib HR 80s    Patient feels no chest pain, no sob, no leg swelling, no PND, no palpitation, no dizziness, no syncope, no CNS symptoms.     Patient has fairly good exercise tolerance.    Patient is compliant with medications.    2D ECHO  4/2016  CONCLUSIONS     1 - Concentric remodeling.     2 - Normal left ventricular systolic function (EF 55-60%).     3 - Normal left ventricular diastolic function.     4 - Normal right ventricular systolic function .     5 - The estimated PA systolic pressure is 31 mmHg.     6 - Mild tricuspid regurgitation.       RICARDO   CONCLUSIONS     1 - No visualized  thrombus in the left atrium with spontaneous echo contrast.     2 - No visualized thrombus in the left atrial appendage.     3 - Normal left ventricular systolic function (EF 55-60%).     4 - Indeterminate LV diastolic function.     5 - Trivial to mild mitral regurgitation.     6 - Trivial tricuspid regurgitation.     This document has been electronically    SIGNED BY: Abundio Alvarado MD On: 10/05/2018 11:02            Past Medical History:   Diagnosis Date    Atrial fibrillation     Hypertension             Past Surgical History:   Procedure Laterality Date    CARDIOVERSION N/A 10/5/2018    Procedure: CARDIOVERSION;  Surgeon: Abundio Alvarado MD;  Location: [facility] CATH LAB;  Service: Cardiology;  Laterality: N/A;  Anesthesia notified 9/24 gcd    CARDIOVERSION N/A 10/5/2018    Performed by Abundio Alvarado MD at [facility] CATH LAB    CYSTOSCOPY W/ URETERAL STENT PLACEMENT Left 9/4/2018    Procedure: CYSTOSCOPY, WITH URETERAL STENT INSERTION;  Surgeon: Ricky Baker IV, MD;  Location: [facility] OR;  Service: Urology;  Laterality: Left;    CYSTOSCOPY, WITH URETERAL STENT INSERTION Left 9/4/2018    Performed by Ricky Baker IV, MD at [facility] OR    ECHOCARDIOGRAM,TRANSESOPHAGEAL N/A 10/5/2018    Performed by Abundio Alvarado MD at [facility] CATH LAB    EXTRACTION - STONE Left 9/4/2018    Performed by Ricky Baker IV, MD at [facility] OR    LASER LITHOTRIPSY Left 9/4/2018    Procedure: LITHOTRIPSY, USING LASER;  Surgeon: Ricky Baker IV, MD;  Location: [facility] OR;  Service: Urology;  Laterality: Left;    LITHOTRIPSY, USING LASER Left 9/4/2018    Performed by Ricky Baker IV, MD at [facility] OR    UPPER GASTROINTESTINAL ENDOSCOPY         Social History     Tobacco Use    Smoking status: Never Smoker    Smokeless tobacco: Never Used   Substance Use Topics    Alcohol use: No    Drug use: No       Family History   Problem Relation Age of Onset    Hypertension Mother     Melanoma Neg Hx     Psoriasis Neg Hx     Lupus Neg Hx          "  Medication List           Accurate as of 10/18/18 12:00 PM. If you have any questions, ask your nurse or doctor.               CONTINUE taking these medications    diltiaZEM 120 MG Cp24  Commonly known as:  CARDIZEM CD  Take 1 capsule (120 mg total) by mouth once daily.     ELIQUIS 5 mg Tab  Generic drug:  apixaban  Take 1 tablet (5 mg total) by mouth 2 (two) times daily.     HYDROcodone-acetaminophen  mg per tablet  Commonly known as:  NORCO     lisinopril 20 MG tablet  Commonly known as:  PRINIVIL,ZESTRIL  Take 1 tablet (20 mg total) by mouth once daily.     metoprolol succinate 100 MG 24 hr tablet  Commonly known as:  TOPROL-XL  Take 1 tablet (100 mg total) by mouth 2 (two) times daily.            Review of Systems   Constitutional: Negative.    HENT: Negative.    Eyes: Negative.    Respiratory: Negative.  Negative for shortness of breath.    Cardiovascular: Negative.  Negative for chest pain and palpitations.   Gastrointestinal: Negative.    Genitourinary: Positive for hematuria.   Musculoskeletal: Negative.    Skin: Negative.    Neurological: Negative.    Endo/Heme/Allergies: Negative.    Psychiatric/Behavioral: Negative.    All 12 systems otherwise negative.      Wt Readings from Last 3 Encounters:   10/18/18 81.3 kg (179 lb 3.7 oz)   10/05/18 80.7 kg (178 lb)   10/03/18 81.1 kg (178 lb 12.7 oz)     Temp Readings from Last 3 Encounters:   10/05/18 97.7 °F (36.5 °C) (Oral)   09/07/18 97.3 °F (36.3 °C) (Tympanic)   09/05/18 96.2 °F (35.7 °C)     BP Readings from Last 3 Encounters:   10/18/18 120/82   10/05/18 117/89   10/03/18 116/72     Pulse Readings from Last 3 Encounters:   10/18/18 76   10/05/18 78   09/10/18 76       /82 (BP Location: Left arm, Patient Position: Sitting, BP Method: Medium (Manual))   Pulse 76 Comment: Irregular  Ht 5' 7" (1.702 m)   Wt 81.3 kg (179 lb 3.7 oz)   BMI 28.07 kg/m²     Objective:   Physical Exam   Constitutional: He is oriented to person, place, and time. He " appears well-developed and well-nourished. No distress.   HENT:   Head: Normocephalic and atraumatic.   Nose: Nose normal.   Mouth/Throat: Oropharynx is clear and moist.   Eyes: Conjunctivae and EOM are normal. No scleral icterus.   Neck: Normal range of motion. Neck supple. No JVD present. No thyromegaly present.   Cardiovascular: Normal rate, S1 normal and S2 normal. An irregularly irregular rhythm present. Exam reveals no gallop, no S3, no S4 and no friction rub.   No murmur heard.  Pulmonary/Chest: Effort normal and breath sounds normal. No stridor. No respiratory distress. He has no wheezes. He has no rales. He exhibits no tenderness.   Abdominal: Soft. Bowel sounds are normal. He exhibits no distension and no mass. There is no tenderness. There is no rebound.   Genitourinary:   Genitourinary Comments: Deferred   Musculoskeletal: Normal range of motion. He exhibits no edema, tenderness or deformity.   Lymphadenopathy:     He has no cervical adenopathy.   Neurological: He is alert and oriented to person, place, and time. He exhibits normal muscle tone. Coordination normal.   Skin: Skin is warm and dry. No rash noted. He is not diaphoretic. No erythema. No pallor.   Psychiatric: He has a normal mood and affect. His behavior is normal. Judgment and thought content normal.   Nursing note and vitals reviewed.      Lab Results   Component Value Date     09/05/2018    K 4.0 09/05/2018     09/05/2018    CO2 22 (L) 09/05/2018    BUN 14 09/05/2018    CREATININE 1.0 09/05/2018     (H) 09/05/2018    AST 14 09/05/2018    ALT 20 09/05/2018    ALBUMIN 3.7 09/05/2018    PROT 6.9 09/05/2018    BILITOT 1.0 09/05/2018    WBC 7.07 09/05/2018    HGB 15.3 09/05/2018    HCT 43.6 09/05/2018    MCV 90 09/05/2018     09/05/2018    TSH 2.151 01/18/2016    CHOL 153 01/18/2016    HDL 39 (L) 01/18/2016    LDLCALC 78.0 01/18/2016    TRIG 180 (H) 01/18/2016    BNP 85 04/01/2016     Assessment:      1. PAF (paroxysmal  atrial fibrillation)    2. Essential hypertension    3. Other sleep apnea        Plan:   1.PAFib s/p RICARDO/DCCV 10/2018 - in Afib  - stress negative in past  - cont BB and CCB   - cont Eliquis for AC  - will rate control now, if symptoms occur will refer to EP for ablation vs antiarrythmic   - refer for sleep study    2. HTN  - cont meds    3. Sleep apnea symptoms  - refer to sleep study      Thank you for allowing me to participate in this patient's care. Please do not hesitate to contact me with any questions or concerns. Consult note has been forwarded to the referral physician.

## 2018-10-19 ENCOUNTER — TELEPHONE (OUTPATIENT)
Dept: PULMONOLOGY | Facility: CLINIC | Age: 48
End: 2018-10-19

## 2018-10-19 ENCOUNTER — OFFICE VISIT (OUTPATIENT)
Dept: PULMONOLOGY | Facility: CLINIC | Age: 48
End: 2018-10-19
Payer: COMMERCIAL

## 2018-10-19 VITALS
SYSTOLIC BLOOD PRESSURE: 122 MMHG | OXYGEN SATURATION: 99 % | RESPIRATION RATE: 17 BRPM | DIASTOLIC BLOOD PRESSURE: 70 MMHG | HEIGHT: 67 IN | HEART RATE: 63 BPM | BODY MASS INDEX: 28.02 KG/M2 | WEIGHT: 178.56 LBS

## 2018-10-19 DIAGNOSIS — R29.818 SUSPECTED SLEEP APNEA: Primary | ICD-10-CM

## 2018-10-19 DIAGNOSIS — R40.0 DAYTIME SOMNOLENCE: ICD-10-CM

## 2018-10-19 DIAGNOSIS — I48.20 CHRONIC A-FIB: ICD-10-CM

## 2018-10-19 DIAGNOSIS — Z92.89 HISTORY OF CARDIOVERSION: ICD-10-CM

## 2018-10-19 DIAGNOSIS — R06.83 SNORING: ICD-10-CM

## 2018-10-19 DIAGNOSIS — R53.82 CHRONIC FATIGUE: ICD-10-CM

## 2018-10-19 DIAGNOSIS — R51.9 MORNING HEADACHE: ICD-10-CM

## 2018-10-19 PROCEDURE — 99204 OFFICE O/P NEW MOD 45 MIN: CPT | Mod: S$GLB,,, | Performed by: INTERNAL MEDICINE

## 2018-10-19 PROCEDURE — 3078F DIAST BP <80 MM HG: CPT | Mod: CPTII,S$GLB,, | Performed by: INTERNAL MEDICINE

## 2018-10-19 PROCEDURE — 3008F BODY MASS INDEX DOCD: CPT | Mod: CPTII,S$GLB,, | Performed by: INTERNAL MEDICINE

## 2018-10-19 PROCEDURE — 99999 PR PBB SHADOW E&M-EST. PATIENT-LVL III: CPT | Mod: PBBFAC,,, | Performed by: INTERNAL MEDICINE

## 2018-10-19 PROCEDURE — 3074F SYST BP LT 130 MM HG: CPT | Mod: CPTII,S$GLB,, | Performed by: INTERNAL MEDICINE

## 2018-10-19 NOTE — PROGRESS NOTES
Initial Outpatient Pulmonary Evaluation       SUBJECTIVE:     History of Present Illness:  Patient is a 48 y.o. male referred for evaluation of suspected sleep apnea.  Patient had AFib since 2011, he had recently had cardioversion but it was found again during his outpatient visit with Cardiology in AFib again.  During his cardioversion snoring was noticed.  Patient complain of snoring, daytime somnolence, chronic fatigue.Complains of morning headaches.    EES 3     He works as a pipe a .    Never smoker.      Chief Complaint   Patient presents with    Sleep Apnea       Review of patient's allergies indicates:   Allergen Reactions    Shellfish containing products Hives       Current Outpatient Medications   Medication Sig Dispense Refill    apixaban (ELIQUIS) 5 mg Tab Take 1 tablet (5 mg total) by mouth 2 (two) times daily. 60 tablet 3    diltiaZEM (CARDIZEM CD) 120 MG Cp24 Take 1 capsule (120 mg total) by mouth once daily. 90 capsule 3    lisinopril (PRINIVIL,ZESTRIL) 20 MG tablet Take 1 tablet (20 mg total) by mouth once daily. 30 tablet 6    metoprolol succinate (TOPROL-XL) 100 MG 24 hr tablet Take 1 tablet (100 mg total) by mouth 2 (two) times daily. 90 tablet 3    HYDROcodone-acetaminophen (NORCO)  mg per tablet TK 1 T PO Q 4 H PRN P  0     No current facility-administered medications for this visit.        Past Medical History:   Diagnosis Date    Atrial fibrillation     Hypertension     Sleep apnea      Past Surgical History:   Procedure Laterality Date    CARDIOVERSION N/A 10/5/2018    Procedure: CARDIOVERSION;  Surgeon: Abundio Alvarado MD;  Location: [facility] CATH LAB;  Service: Cardiology;  Laterality: N/A;  Anesthesia notified 9/24 gcd    CARDIOVERSION N/A 10/5/2018    Performed by Abundio Alvarado MD at [facility] CATH LAB    CYSTOSCOPY W/ URETERAL STENT PLACEMENT Left 9/4/2018    Procedure: CYSTOSCOPY, WITH URETERAL STENT INSERTION;  Surgeon: Ricky  "KATARZYNA Baker IV, MD;  Location: [facility] OR;  Service: Urology;  Laterality: Left;    CYSTOSCOPY, WITH URETERAL STENT INSERTION Left 9/4/2018    Performed by Ricky Baker IV, MD at [facility] OR    ECHOCARDIOGRAM,TRANSESOPHAGEAL N/A 10/5/2018    Performed by Abundio Alvarado MD at [facility] CATH LAB    EXTRACTION - STONE Left 9/4/2018    Performed by Ricky Baker IV, MD at [facility] OR    LASER LITHOTRIPSY Left 9/4/2018    Procedure: LITHOTRIPSY, USING LASER;  Surgeon: Ricky Baker IV, MD;  Location: [facility] OR;  Service: Urology;  Laterality: Left;    LITHOTRIPSY, USING LASER Left 9/4/2018    Performed by Ricky Baker IV, MD at [facility] OR    UPPER GASTROINTESTINAL ENDOSCOPY       Family History   Problem Relation Age of Onset    Hypertension Mother     Melanoma Neg Hx     Psoriasis Neg Hx     Lupus Neg Hx      Social History     Tobacco Use    Smoking status: Never Smoker    Smokeless tobacco: Never Used   Substance Use Topics    Alcohol use: No    Drug use: No        Review of Systems:  Review of Systems   Constitutional: Positive for fatigue.   HENT: Negative for nosebleeds.         SNORING   Eyes: Negative for visual disturbance.   Respiratory: Negative for shortness of breath.    Cardiovascular: Negative for chest pain.        CHRONIC AFIB   Gastrointestinal: Negative for abdominal pain.   Endocrine: Negative for polyphagia.   Genitourinary: Negative for hematuria.   Musculoskeletal: Negative for gait problem.   Skin: Negative for wound.   Neurological: Positive for headaches.        MORNING HEADACHES   Hematological: Negative for adenopathy.   Psychiatric/Behavioral: Negative for behavioral problems.       OBJECTIVE:     Vital Signs (Most Recent)  Pulse: 63 (10/19/18 0918)  Resp: 17 (10/19/18 0918)  BP: 122/70 (10/19/18 0918)  SpO2: 99 % (10/19/18 0918)  5' 7" (1.702 m)  81 kg (178 lb 9.2 oz)     Physical Exam:  Physical Exam   Constitutional: He is oriented to person, place, and time. He appears " well-developed and well-nourished.   HENT:   Head: Atraumatic.   Mallampati 3   Eyes: EOM are normal.   Neck: Neck supple.   Broad neck  18 in neck circumference   Cardiovascular: Normal rate.   Pulmonary/Chest: Effort normal.   Abdominal: Soft.   Musculoskeletal: He exhibits no edema or deformity.   Neurological: He is alert and oriented to person, place, and time.   Skin: Skin is warm.   Psychiatric: He has a normal mood and affect.       Laboratory    Lab Results   Component Value Date    WBC 7.07 09/05/2018    HGB 15.3 09/05/2018    HCT 43.6 09/05/2018    MCV 90 09/05/2018     09/05/2018     BMP  Lab Results   Component Value Date     09/05/2018    K 4.0 09/05/2018     09/05/2018    CO2 22 (L) 09/05/2018    BUN 14 09/05/2018    CREATININE 1.0 09/05/2018    CALCIUM 9.2 09/05/2018    ANIONGAP 12 09/05/2018    ESTGFRAFRICA >60 09/05/2018    EGFRNONAA >60 09/05/2018     BNP  @LABRCNTIP(BNP,BNPTRIAGEBLO)@  Lab Results   Component Value Date    TSH 2.151 01/18/2016     ABG  @LABRCNTIP(PH,PO2,PCO2,HCO3,BE)@    Diagnostic Results:    Chest x-ray August 2018 personally reviewed showed clear lungs.    RICARDO October 5, 2018      1 - No visualized thrombus in the left atrium with spontaneous echo contrast.     2 - No visualized thrombus in the left atrial appendage.     3 - Normal left ventricular systolic function (EF 55-60%).     4 - Indeterminate LV diastolic function.     5 - Trivial to mild mitral regurgitation.     6 - Trivial tricuspid regurgitation.   ASSESSMENT/PLAN:     1. Suspected sleep apnea    2. Snoring    3. Daytime somnolence    4. Chronic fatigue    5. Morning headache    6. Chronic a-fib    7. History of cardioversion      Home sleep study.    Further recommendation to follow home sleep study results.    Follow-up in about 2 months (around 12/19/2018).    This note was prepared using voice recognition system and is likely to have sound alike errors that may have been overlooked even after  proof reading.  Please call me with any questions    Discussed diagnosis, its evaluation, treatment and usual course. All questions answered.    Thank you for the courtesy of participating in the care of this patient    Pankaj Pat MD

## 2018-10-19 NOTE — LETTER
October 19, 2018      Abundio Alvarado MD  [address] [facility] [address]           [facility]  [address] [place] [id number]  Phone: [phone]  Fax: [phone]          Patient: Renzo Salgado   MR Number: [id number]   YOB: 1970   Date of Visit: 10/19/2018       Dear Dr. Abundio Alvarado:    Thank you for referring Renzo Salgado to me for evaluation. Attached you will find relevant portions of my assessment and plan of care.    If you have questions, please do not hesitate to call me. I look forward to following Renzo Salgado along with you.    Sincerely,    Pankaj Pat MD    Enclosure  CC:  No Recipients    If you would like to receive this communication electronically, please contact [email or web address] or [phone] to request more information on [employer] Link access.    For providers and/or their staff who would like to refer a patient to [facility], please contact us through our one-stop-shop provider referral line, [facility], at [phone].    If you feel you have received this communication in error or would no longer like to receive these types of communications, please e-mail [email or web address]

## 2018-10-26 ENCOUNTER — PROCEDURE VISIT (OUTPATIENT)
Dept: SLEEP MEDICINE | Facility: CLINIC | Age: 48
End: 2018-10-26
Payer: COMMERCIAL

## 2018-10-26 DIAGNOSIS — G47.33 OSA (OBSTRUCTIVE SLEEP APNEA): Primary | ICD-10-CM

## 2018-10-26 DIAGNOSIS — R51.9 MORNING HEADACHE: ICD-10-CM

## 2018-10-26 DIAGNOSIS — R40.0 DAYTIME SOMNOLENCE: ICD-10-CM

## 2018-10-26 DIAGNOSIS — R53.82 CHRONIC FATIGUE: ICD-10-CM

## 2018-10-26 DIAGNOSIS — R06.83 SNORING: ICD-10-CM

## 2018-10-26 PROCEDURE — 95806 SLEEP STUDY UNATT&RESP EFFT: CPT | Mod: S$GLB,,, | Performed by: INTERNAL MEDICINE

## 2018-10-26 PROCEDURE — 99499 UNLISTED E&M SERVICE: CPT | Mod: S$GLB,,, | Performed by: INTERNAL MEDICINE

## 2018-10-26 NOTE — PROGRESS NOTES
Assessment and Recommendations  Adequate study. Duration 7 hr 17 minutes. SpO2 britany was 91%  Heart rate variability present.  Snoring was 100%.  Apnea Hypopnea Index: 12.7/hr ( 93 events)  Mild obstructive sleep apnea  Recommendations  Consider Obstructive sleep apnea treatment as appropriate for this patient  Treatment options may include positive airway pressure (PAP) devices such as continuous PAP (CPAP), auto adjusted PAP  (APAP) (and bilevel PAP (BIPAP). Oral appliance implementation ( mandibular advancement device) during sleep with upper  airway surgery may be considered in selected patient's as deemed suitable by the appropriate specialists. Hypoglossal nerve  pacemaker ( INSPIRE) has been indicated in appropriate candidates..  If APAP is utilized for titration and or treatment an initial ranged setting of 5-20 cm water pressure may be considered if there  are no contraindications.  If CPAP is utilized without a PAP titration and in-lab CPAP facility based Pap titration is recommended.  With any PAP device monitoring patient compliance monitoring based on Medicare requirements during the 1st 90 days of use  is required.  Weight loss and smoking cessation if indicated recommended with interventions that may supplement primary treatment of  obstructive sleep apnea.  Alcohol and sedative use may worsen sleep apnea and daytime sleepiness. Patient is counseling is recommended as appropriate.  Where applicable utilized PAP device efficacy reports, additional testing and face-to-face clinical evaluation subsequent to any  treatment, changes in treatment, major behavior modifications.  If symptomatic relief resolution of sleep apnea is not achieved or suspicion for continued sleep disturbance persists consider  referral to a sleep specialist.  Recommend further evaluation for suspected hypoxemia via nocturnal oximetry once obstructive sleep apnea is adequately  controlled. If indicated supplementary oxygen may be  considered in conjunction with the selected sleep apnea therapy.  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

## 2018-10-26 NOTE — Clinical Note
Assessment and RecommendationsAdequate study. Duration 7 hr 17 minutes. SpO2 britany was 91%Heart rate variability present.Snoring was 100%.Apnea Hypopnea Index: 12.7/hr ( 93 events)Mild obstructive sleep apneaRecommendationsConsider Obstructive sleep apnea treatment as appropriate for this patientTreatment options may include positive airway pressure (PAP) devices such as continuous PAP (CPAP), auto adjusted PAP(APAP) (and bilevel PAP (BIPAP). Oral appliance implementation ( mandibular advancement device) during sleep with upperairway surgery may be considered in selected patient's as deemed suitable by the appropriate specialists. Hypoglossal nervepacemaker ( INSPIRE) has been indicated in appropriate candidates..If APAP is utilized for titration and or treatment an initial ranged setting of 5-20 cm water pressure may be considered if thereare no contraindications.If CPAP is utilized without a PAP titration and in-lab CPAP facility based Pap titration is recommended.With any PAP

## 2018-10-26 NOTE — PROCEDURES
Home Sleep Studies  Date/Time: 10/26/2018 4:58 PM  Performed by: Chris Pang MD  Authorized by: Pankaj Pat MD       Assessment and Recommendations  Adequate study. Duration 7 hr 17 minutes. SpO2 britany was 91%  Heart rate variability present.  Snoring was 100%.  Apnea Hypopnea Index: 12.7/hr ( 93 events)  Mild obstructive sleep apnea  Recommendations  Consider Obstructive sleep apnea treatment as appropriate for this patient  Treatment options may include positive airway pressure (PAP) devices such as continuous PAP (CPAP), auto adjusted PAP  (APAP) (and bilevel PAP (BIPAP). Oral appliance implementation ( mandibular advancement device) during sleep with upper  airway surgery may be considered in selected patient's as deemed suitable by the appropriate specialists. Hypoglossal nerve  pacemaker ( INSPIRE) has been indicated in appropriate candidates..  If APAP is utilized for titration and or treatment an initial ranged setting of 5-20 cm water pressure may be considered if there  are no contraindications.  If CPAP is utilized without a PAP titration and in-lab CPAP facility based Pap titration is recommended.  With any PAP device monitoring patient compliance monitoring based on Medicare requirements during the 1st 90 days of use  is required.  Weight loss and smoking cessation if indicated recommended with interventions that may supplement primary treatment of  obstructive sleep apnea.  Alcohol and sedative use may worsen sleep apnea and daytime sleepiness. Patient is counseling is recommended as appropriate.  Where applicable utilized PAP device efficacy reports, additional testing and face-to-face clinical evaluation subsequent to any  treatment, changes in treatment, major behavior modifications.  If symptomatic relief resolution of sleep apnea is not achieved or suspicion for continued sleep disturbance persists consider  referral to a sleep specialist.  Recommend further evaluation for suspected  hypoxemia via nocturnal oximetry once obstructive sleep apnea is adequately  controlled. If indicated supplementary oxygen may be considered in conjunction with the selected sleep apnea therapy.  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

## 2018-10-27 DIAGNOSIS — G47.33 OSA (OBSTRUCTIVE SLEEP APNEA): Primary | ICD-10-CM

## 2019-02-08 ENCOUNTER — OFFICE VISIT (OUTPATIENT)
Dept: PULMONOLOGY | Facility: CLINIC | Age: 49
End: 2019-02-08
Payer: COMMERCIAL

## 2019-02-08 VITALS
WEIGHT: 185.63 LBS | RESPIRATION RATE: 16 BRPM | SYSTOLIC BLOOD PRESSURE: 125 MMHG | HEIGHT: 67 IN | BODY MASS INDEX: 29.13 KG/M2 | HEART RATE: 92 BPM | OXYGEN SATURATION: 98 % | DIASTOLIC BLOOD PRESSURE: 60 MMHG

## 2019-02-08 DIAGNOSIS — Z92.89 HISTORY OF CARDIOVERSION: ICD-10-CM

## 2019-02-08 DIAGNOSIS — I48.20 CHRONIC A-FIB: ICD-10-CM

## 2019-02-08 DIAGNOSIS — R51.9 MORNING HEADACHE: ICD-10-CM

## 2019-02-08 DIAGNOSIS — R06.83 SNORING: ICD-10-CM

## 2019-02-08 DIAGNOSIS — G47.33 OSA (OBSTRUCTIVE SLEEP APNEA): Primary | ICD-10-CM

## 2019-02-08 PROCEDURE — 3078F DIAST BP <80 MM HG: CPT | Mod: CPTII,S$GLB,, | Performed by: INTERNAL MEDICINE

## 2019-02-08 PROCEDURE — 3078F PR MOST RECENT DIASTOLIC BLOOD PRESSURE < 80 MM HG: ICD-10-PCS | Mod: CPTII,S$GLB,, | Performed by: INTERNAL MEDICINE

## 2019-02-08 PROCEDURE — 99999 PR PBB SHADOW E&M-EST. PATIENT-LVL III: CPT | Mod: PBBFAC,,, | Performed by: INTERNAL MEDICINE

## 2019-02-08 PROCEDURE — 3074F PR MOST RECENT SYSTOLIC BLOOD PRESSURE < 130 MM HG: ICD-10-PCS | Mod: CPTII,S$GLB,, | Performed by: INTERNAL MEDICINE

## 2019-02-08 PROCEDURE — 3074F SYST BP LT 130 MM HG: CPT | Mod: CPTII,S$GLB,, | Performed by: INTERNAL MEDICINE

## 2019-02-08 PROCEDURE — 99214 PR OFFICE/OUTPT VISIT, EST, LEVL IV, 30-39 MIN: ICD-10-PCS | Mod: S$GLB,,, | Performed by: INTERNAL MEDICINE

## 2019-02-08 PROCEDURE — 3008F BODY MASS INDEX DOCD: CPT | Mod: CPTII,S$GLB,, | Performed by: INTERNAL MEDICINE

## 2019-02-08 PROCEDURE — 99214 OFFICE O/P EST MOD 30 MIN: CPT | Mod: S$GLB,,, | Performed by: INTERNAL MEDICINE

## 2019-02-08 PROCEDURE — 99999 PR PBB SHADOW E&M-EST. PATIENT-LVL III: ICD-10-PCS | Mod: PBBFAC,,, | Performed by: INTERNAL MEDICINE

## 2019-02-08 PROCEDURE — 3008F PR BODY MASS INDEX (BMI) DOCUMENTED: ICD-10-PCS | Mod: CPTII,S$GLB,, | Performed by: INTERNAL MEDICINE

## 2019-02-08 NOTE — PROGRESS NOTES
Pulmonary Outpatient Follow Up Visit     Subjective:       Patient ID: Renzo Salgado is a 48 y.o. male.    Chief Complaint: Sleep Apnea      HPI    48-year-old male presenting for follow-up.    He was initially evaluated for suspected obstructive sleep apnea.  Home sleep study showed mild obstructive sleep apnea with AHI of 12 events per hour.    He is compliant with his machine.  Feeling better more energetic and morning headaches has significantly improved.      Patient has AFib since 2011.  He had recent cardioversion.  During his outpatient visit with Cardiology was found in AFib again.    He is rate controlled with Cardizem , metoprolol.  He is on p.o. apixaban.    He works as a pipe .    Never smoker.    Review of Systems   Constitutional: Negative for fever and chills.   HENT: Negative for nosebleeds.    Eyes: Negative for redness.   Respiratory: Positive for apnea and snoring. Negative for choking.    Genitourinary: Negative for hematuria.   Endocrine: Negative for cold intolerance.    Musculoskeletal: Positive for arthralgias.   Gastrointestinal: Negative for vomiting.   Neurological: Positive for headaches. Negative for syncope.   Hematological: Negative for adenopathy. Bleeds easily and excessive bruising.   Psychiatric/Behavioral: Negative for confusion.     Outpatient Encounter Medications as of 2/8/2019   Medication Sig Dispense Refill    apixaban (ELIQUIS) 5 mg Tab Take 1 tablet (5 mg total) by mouth 2 (two) times daily. 60 tablet 3    diltiaZEM (CARDIZEM CD) 120 MG Cp24 Take 1 capsule (120 mg total) by mouth once daily. 90 capsule 3    lisinopril (PRINIVIL,ZESTRIL) 20 MG tablet Take 1 tablet (20 mg total) by mouth once daily. 30 tablet 6    metoprolol succinate (TOPROL-XL) 100 MG 24 hr tablet Take 1 tablet (100 mg total) by mouth 2 (two) times daily. 90 tablet 3    [DISCONTINUED] HYDROcodone-acetaminophen (NORCO)  mg per tablet TK 1 T  "PO Q 4 H PRN P  0     No facility-administered encounter medications on file as of 2/8/2019.        Objective:     Vital Signs (Most Recent)  Vital Signs  Pulse: 92  Resp: 16  SpO2: 98 %  BP: 125/60  Height and Weight  Height: 5' 7" (170.2 cm)  Weight: 84.2 kg (185 lb 10 oz)  BSA (Calculated - sq m): 1.99 sq meters  BMI (Calculated): 29.1  Weight in (lb) to have BMI = 25: 159.3]  Wt Readings from Last 2 Encounters:   02/08/19 84.2 kg (185 lb 10 oz)   10/19/18 81 kg (178 lb 9.2 oz)       Physical Exam   Constitutional: He is oriented to person, place, and time. He appears well-developed.   HENT:   Head: Normocephalic.   Mouth/Throat: Mallampati Score: III.   Neck: Neck supple.   Cardiovascular: Normal rate.   Pulmonary/Chest: Normal expansion and effort normal. No stridor. No respiratory distress. He exhibits no tenderness.   Abdominal: Soft.   Musculoskeletal: He exhibits no tenderness.   Lymphadenopathy:     He has no cervical adenopathy.   Neurological: He is alert and oriented to person, place, and time. Gait normal.   Skin: Skin is warm. No cyanosis. Nails show no clubbing.   Psychiatric: He has a normal mood and affect. His behavior is normal. Judgment and thought content normal.   Nursing note and vitals reviewed.      Laboratory  Lab Results   Component Value Date    WBC 7.07 09/05/2018    RBC 4.85 09/05/2018    HGB 15.3 09/05/2018    HCT 43.6 09/05/2018    MCV 90 09/05/2018    MCH 31.5 (H) 09/05/2018    MCHC 35.1 09/05/2018    RDW 13.2 09/05/2018     09/05/2018    MPV 10.2 09/05/2018    GRAN 5.8 09/05/2018    GRAN 82.0 (H) 09/05/2018    LYMPH 1.1 09/05/2018    LYMPH 14.9 (L) 09/05/2018    MONO 0.2 (L) 09/05/2018    MONO 2.7 (L) 09/05/2018    EOS 0.0 09/05/2018    BASO 0.02 09/05/2018    EOSINOPHIL 0.1 09/05/2018    BASOPHIL 0.3 09/05/2018       BMP  Lab Results   Component Value Date     09/05/2018    K 4.0 09/05/2018     09/05/2018    CO2 22 (L) 09/05/2018    BUN 14 09/05/2018    " CREATININE 1.0 09/05/2018    CALCIUM 9.2 09/05/2018    ANIONGAP 12 09/05/2018    ESTGFRAFRICA >60 09/05/2018    EGFRNONAA >60 09/05/2018    AST 14 09/05/2018    ALT 20 09/05/2018    PROT 6.9 09/05/2018       Lab Results   Component Value Date    BNP 85 04/01/2016       Lab Results   Component Value Date    TSH 2.151 01/18/2016       No results found for: SEDRATE    No results found for: CRP      Diagnostic Results:  I have personally reviewed today the following studies:    Chest x-ray August 2018 personally reviewed showed clear lungs.     RICARDO October 5, 2018      1 - No visualized thrombus in the left atrium with spontaneous echo contrast.     2 - No visualized thrombus in the left atrial appendage.     3 - Normal left ventricular systolic function (EF 55-60%).     4 - Indeterminate LV diastolic function.     5 - Trivial to mild mitral regurgitation.     6 - Trivial tricuspid regurgitation.      Assessment and Recommendations  Adequate study. Duration 7 hr 17 minutes. SpO2 britany was 91%  Heart rate variability present.  Snoring was 100%.  Apnea Hypopnea Index: 12.7/hr ( 93 events)  Mild obstructive sleep apnea  Recommendations  Consider Obstructive sleep apnea treatment as appropriate for this patient  Treatment options may include positive airway pressure (PAP) devices such as continuous PAP (CPAP), auto adjusted PAP  (APAP) (and bilevel PAP (BIPAP).        Compliance Summary  1/9/2019 - 2/7/2019 (30 days)  Days with Device Usage 30 days  Days without Device Usage 0 days  Percent Days with Device Usage 100.0%  Cumulative Usage 10 days 1 hrs. 2 mins. 38 secs.  Maximum Usage (1 Day) 9 hrs. 49 mins. 8 secs.  Average Usage (All Days) 8 hrs. 2 mins. 5 secs.  Average Usage (Days Used) 8 hrs. 2 mins. 5 secs.  Minimum Usage (1 Day) 4 hrs. 21 mins.  Percent of Days with Usage >= 4 Hours 100.0%  Percent of Days with Usage < 4 Hours 0.0%  Date Range  Total Blower Time 10 days 5 hrs. 28 mins. 24 secs.  Average AHI  3.2  Auto-CPAP Summary  Auto-CPAP Mean Pressure 5.7 cmH2O  Auto-CPAP Peak Average Pressure 6.7 cmH2O  Average Device Pressure <= 90% of Time 6.9 cmH2O  Average Time in Large Leak Per Day 3 mins. 14  Assessment/Plan:   KIKO (obstructive sleep apnea)    Snoring    Morning headache    Chronic a-fib    History of cardioversion      Continue auto CPAP during sleep.    Continue rate control and apixaban p.o..    Patient has requested supplies for CPAP recently.    Advised to remain Up-to-date on influenza vaccination.      Follow-up in about 6 months (around 8/8/2019).    This note was prepared using voice recognition system and is likely to have sound alike errors that may have been overlooked even after proof reading.  Please call me with any questions    Discussed diagnosis, its evaluation, treatment and usual course. All questions answered.      Pankaj Pat MD

## 2019-04-02 ENCOUNTER — TELEPHONE (OUTPATIENT)
Dept: RADIOLOGY | Facility: HOSPITAL | Age: 49
End: 2019-04-02

## 2019-04-03 ENCOUNTER — HOSPITAL ENCOUNTER (OUTPATIENT)
Dept: RADIOLOGY | Facility: HOSPITAL | Age: 49
Discharge: HOME | End: 2019-04-03
Attending: UROLOGY
Payer: COMMERCIAL

## 2019-04-03 DIAGNOSIS — N20.1 URETERAL STONE: ICD-10-CM

## 2019-04-03 PROCEDURE — 76770 US EXAM ABDO BACK WALL COMP: CPT | Mod: TC

## 2019-04-03 PROCEDURE — 74018 RADEX ABDOMEN 1 VIEW: CPT | Mod: 26,,, | Performed by: RADIOLOGY

## 2019-04-03 PROCEDURE — 74018 RADEX ABDOMEN 1 VIEW: CPT | Mod: TC

## 2019-04-03 PROCEDURE — 76770 US RETROPERITONEAL COMPLETE: ICD-10-PCS | Mod: 26,,, | Performed by: RADIOLOGY

## 2019-04-03 PROCEDURE — 76770 US EXAM ABDO BACK WALL COMP: CPT | Mod: 26,,, | Performed by: RADIOLOGY

## 2019-04-03 PROCEDURE — 74018 XR ABDOMEN AP 1 VIEW: ICD-10-PCS | Mod: 26,,, | Performed by: RADIOLOGY

## 2019-04-05 DIAGNOSIS — Z12.11 COLON CANCER SCREENING: ICD-10-CM

## 2019-04-29 DIAGNOSIS — I48.0 PAROXYSMAL ATRIAL FIBRILLATION: Primary | ICD-10-CM

## 2019-04-30 ENCOUNTER — CLINICAL SUPPORT (OUTPATIENT)
Dept: CARDIOLOGY | Facility: CLINIC | Age: 49
End: 2019-04-30
Payer: COMMERCIAL

## 2019-04-30 ENCOUNTER — OFFICE VISIT (OUTPATIENT)
Dept: CARDIOLOGY | Facility: CLINIC | Age: 49
End: 2019-04-30
Payer: COMMERCIAL

## 2019-04-30 VITALS
HEIGHT: 67 IN | DIASTOLIC BLOOD PRESSURE: 90 MMHG | SYSTOLIC BLOOD PRESSURE: 126 MMHG | BODY MASS INDEX: 29.1 KG/M2 | WEIGHT: 185.44 LBS | HEART RATE: 87 BPM

## 2019-04-30 DIAGNOSIS — I10 ESSENTIAL HYPERTENSION: ICD-10-CM

## 2019-04-30 DIAGNOSIS — I48.0 PAROXYSMAL ATRIAL FIBRILLATION: ICD-10-CM

## 2019-04-30 DIAGNOSIS — G47.33 OSA (OBSTRUCTIVE SLEEP APNEA): ICD-10-CM

## 2019-04-30 DIAGNOSIS — I48.0 PAF (PAROXYSMAL ATRIAL FIBRILLATION): Primary | ICD-10-CM

## 2019-04-30 PROCEDURE — 3080F PR MOST RECENT DIASTOLIC BLOOD PRESSURE >= 90 MM HG: ICD-10-PCS | Mod: CPTII,S$GLB,, | Performed by: INTERNAL MEDICINE

## 2019-04-30 PROCEDURE — 93010 EKG 12-LEAD: ICD-10-PCS | Mod: S$GLB,,, | Performed by: INTERNAL MEDICINE

## 2019-04-30 PROCEDURE — 99999 PR PBB SHADOW E&M-EST. PATIENT-LVL III: ICD-10-PCS | Mod: PBBFAC,,, | Performed by: INTERNAL MEDICINE

## 2019-04-30 PROCEDURE — 3074F SYST BP LT 130 MM HG: CPT | Mod: CPTII,S$GLB,, | Performed by: INTERNAL MEDICINE

## 2019-04-30 PROCEDURE — 3074F PR MOST RECENT SYSTOLIC BLOOD PRESSURE < 130 MM HG: ICD-10-PCS | Mod: CPTII,S$GLB,, | Performed by: INTERNAL MEDICINE

## 2019-04-30 PROCEDURE — 3008F BODY MASS INDEX DOCD: CPT | Mod: CPTII,S$GLB,, | Performed by: INTERNAL MEDICINE

## 2019-04-30 PROCEDURE — 99214 PR OFFICE/OUTPT VISIT, EST, LEVL IV, 30-39 MIN: ICD-10-PCS | Mod: S$GLB,,, | Performed by: INTERNAL MEDICINE

## 2019-04-30 PROCEDURE — 3008F PR BODY MASS INDEX (BMI) DOCUMENTED: ICD-10-PCS | Mod: CPTII,S$GLB,, | Performed by: INTERNAL MEDICINE

## 2019-04-30 PROCEDURE — 99214 OFFICE O/P EST MOD 30 MIN: CPT | Mod: S$GLB,,, | Performed by: INTERNAL MEDICINE

## 2019-04-30 PROCEDURE — 93005 EKG 12-LEAD: ICD-10-PCS | Mod: S$GLB,,, | Performed by: INTERNAL MEDICINE

## 2019-04-30 PROCEDURE — 93005 ELECTROCARDIOGRAM TRACING: CPT | Mod: S$GLB,,, | Performed by: INTERNAL MEDICINE

## 2019-04-30 PROCEDURE — 93010 ELECTROCARDIOGRAM REPORT: CPT | Mod: S$GLB,,, | Performed by: INTERNAL MEDICINE

## 2019-04-30 PROCEDURE — 3080F DIAST BP >= 90 MM HG: CPT | Mod: CPTII,S$GLB,, | Performed by: INTERNAL MEDICINE

## 2019-04-30 PROCEDURE — 99999 PR PBB SHADOW E&M-EST. PATIENT-LVL III: CPT | Mod: PBBFAC,,, | Performed by: INTERNAL MEDICINE

## 2019-04-30 NOTE — PROGRESS NOTES
Subjective:   Patient ID:  Renzo Salgado is a 48 y.o. male who presents for cardiac consult of Atrial Fibrillation (6 mo f/u) and Hypertension      HPI  The patient came in today for cardiac consult of Atrial Fibrillation (6 mo f/u) and Hypertension    Renzo Salgado is a 48 y.o. male  pt with PAFib on Eliquis, HTN, renal stones presents for follow up CV evaluation.     9/10/18  He recently had left ureteroscopy with laser lithotripsy and stone basket extraction and Cystoscopy with placement of left double-J ureteral stent 9/4/18 per Dr. Baker. Prior to surgery, the pt had Afib with RVR. Cardiology was consulted. Pt received IV Cardizem and was placed on a IV Cardizem drip. He underwent surgery and was admitted for uncontrolled Afib on IV Cardizem. Cardizem 120 mg po added to his regimen.     His cardiologist is Dr. Ned Parker with [facility]. Last Friday he saw Dr. Parker. He may get a DCCV. He had Afib since 2011.  He was also on Multaq but has been in Afib. He is here for a second opinion. Pt had stress test in 2011 which was negative.   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     10/18/18  Pt had RICARDO/DCCV last week which was successful to convert to NSR. Unfortunately he is back in Afib, rate controlled at 80 bpm. Walks everyday without issues. He feels well today. Will refer to sleep apnea. Pt does snore, nocturia, fatigue at times. ECG- AFib HR 80s    4/30/19  Pt had sleep study which was abnormal, needs CPAP. He has started CPAP since Jan, breathing improved, less apneic episodes. No bleeding issues with Eliquis. BP mildly elevated, pt rushed getting here and mildly stressed. Discussed will do repeat DCCV.     Patient feels no chest pain, no sob, no leg swelling, no PND, no palpitation, no dizziness, no syncope, no CNS symptoms.     Patient has fairly good exercise tolerance.    Patient is compliant with medications.    2D ECHO  4/2016  CONCLUSIONS     1 - Concentric remodeling.     2 - Normal  left ventricular systolic function (EF 55-60%).     3 - Normal left ventricular diastolic function.     4 - Normal right ventricular systolic function .     5 - The estimated PA systolic pressure is 31 mmHg.     6 - Mild tricuspid regurgitation.       RICARDO   CONCLUSIONS     1 - No visualized thrombus in the left atrium with spontaneous echo contrast.     2 - No visualized thrombus in the left atrial appendage.     3 - Normal left ventricular systolic function (EF 55-60%).     4 - Indeterminate LV diastolic function.     5 - Trivial to mild mitral regurgitation.     6 - Trivial tricuspid regurgitation.     This document has been electronically    SIGNED BY: Abundio Alvarado MD On: 10/05/2018 11:02            Past Medical History:   Diagnosis Date    Atrial fibrillation     Hypertension             Past Surgical History:   Procedure Laterality Date    CARDIOVERSION N/A 10/5/2018    Performed by Abundio Alvarado MD at [facility] CATH LAB    CYSTOSCOPY, WITH URETERAL STENT INSERTION Left 9/4/2018    Performed by Ricky Baker IV, MD at [facility] OR    ECHOCARDIOGRAM,TRANSESOPHAGEAL N/A 10/5/2018    Performed by Abundio Alvarado MD at [facility] CATH [facility]    EXTRACTION - STONE Left 9/4/2018    Performed by Ricky Baker IV, MD at [facility]    LITHOTRIPSY, USING LASER Left 9/4/2018    Performed by Ricky Baker IV, MD at [facility] OR    UPPER GASTROINTESTINAL ENDOSCOPY         Social History     Tobacco Use    Smoking status: Never Smoker    Smokeless tobacco: Never Used   Substance Use Topics    Alcohol use: No    Drug use: No       Family History   Problem Relation Age of Onset    Hypertension Mother     Melanoma Neg Hx     Psoriasis Neg Hx     Lupus Neg Hx        Patient's Medications   New Prescriptions    No medications on file   Previous Medications    APIXABAN (ELIQUIS) 5 MG TAB    Take 1 tablet (5 mg total) by mouth 2 (two) times daily.    DILTIAZEM (CARDIZEM CD) 120 MG CP24    Take 1 capsule (120 mg total) by mouth once  "daily.    LISINOPRIL (PRINIVIL,ZESTRIL) 20 MG TABLET    Take 1 tablet (20 mg total) by mouth once daily.    METOPROLOL SUCCINATE (TOPROL-XL) 100 MG 24 HR TABLET    Take 1 tablet (100 mg total) by mouth 2 (two) times daily.   Modified Medications    No medications on file   Discontinued Medications    No medications on file       Review of Systems   Constitutional: Negative.    HENT: Negative.    Eyes: Negative.    Respiratory: Negative.  Negative for shortness of breath.    Cardiovascular: Negative.  Negative for chest pain and palpitations.   Gastrointestinal: Negative.    Genitourinary: Positive for hematuria.   Musculoskeletal: Negative.    Skin: Negative.    Neurological: Negative.    Endo/Heme/Allergies: Negative.    Psychiatric/Behavioral: Negative.    All 12 systems otherwise negative.      Wt Readings from Last 3 Encounters:   04/30/19 84.1 kg (185 lb 6.5 oz)   02/08/19 84.2 kg (185 lb 10 oz)   10/19/18 81 kg (178 lb 9.2 oz)     Temp Readings from Last 3 Encounters:   10/05/18 97.7 °F (36.5 °C) (Oral)   09/07/18 97.3 °F (36.3 °C) (Tympanic)   09/05/18 96.2 °F (35.7 °C)     BP Readings from Last 3 Encounters:   04/30/19 (!) 126/90   02/08/19 125/60   10/19/18 122/70     Pulse Readings from Last 3 Encounters:   04/30/19 87   02/08/19 92   10/19/18 63       BP (!) 126/90 (BP Method: Large (Manual))   Pulse 87   Ht 5' 7" (1.702 m)   Wt 84.1 kg (185 lb 6.5 oz)   BMI 29.04 kg/m²     Objective:   Physical Exam   Constitutional: He is oriented to person, place, and time. He appears well-developed and well-nourished. No distress.   HENT:   Head: Normocephalic and atraumatic.   Nose: Nose normal.   Mouth/Throat: Oropharynx is clear and moist.   Eyes: Conjunctivae and EOM are normal. No scleral icterus.   Neck: Normal range of motion. Neck supple. No JVD present. No thyromegaly present.   Cardiovascular: Normal rate, S1 normal and S2 normal. An irregularly irregular rhythm present. Exam reveals no gallop, no S3, no " S4 and no friction rub.   No murmur heard.  Pulmonary/Chest: Effort normal and breath sounds normal. No stridor. No respiratory distress. He has no wheezes. He has no rales. He exhibits no tenderness.   Abdominal: Soft. Bowel sounds are normal. He exhibits no distension and no mass. There is no tenderness. There is no rebound.   Genitourinary:   Genitourinary Comments: Deferred   Musculoskeletal: Normal range of motion. He exhibits no edema, tenderness or deformity.   Lymphadenopathy:     He has no cervical adenopathy.   Neurological: He is alert and oriented to person, place, and time. He exhibits normal muscle tone. Coordination normal.   Skin: Skin is warm and dry. No rash noted. He is not diaphoretic. No erythema. No pallor.   Psychiatric: He has a normal mood and affect. His behavior is normal. Judgment and thought content normal.   Nursing note and vitals reviewed.      Lab Results   Component Value Date     09/05/2018    K 4.0 09/05/2018     09/05/2018    CO2 22 (L) 09/05/2018    BUN 14 09/05/2018    CREATININE 1.0 09/05/2018     (H) 09/05/2018    AST 14 09/05/2018    ALT 20 09/05/2018    ALBUMIN 3.7 09/05/2018    PROT 6.9 09/05/2018    BILITOT 1.0 09/05/2018    WBC 7.07 09/05/2018    HGB 15.3 09/05/2018    HCT 43.6 09/05/2018    MCV 90 09/05/2018     09/05/2018    TSH 2.151 01/18/2016    CHOL 153 01/18/2016    HDL 39 (L) 01/18/2016    LDLCALC 78.0 01/18/2016    TRIG 180 (H) 01/18/2016    BNP 85 04/01/2016     Assessment:      1. PAF (paroxysmal atrial fibrillation)    2. Essential hypertension    3. KIKO (obstructive sleep apnea)        Plan:   1.PAFib s/p RICARDO/DCCV 10/2018 - in Afib  - stress negative in past  - cont BB and CCB   - cont Eliquis for AC  - will rate control now, if symptoms occur will refer to EP for ablation vs antiarrythmic   - will set up for DCCV for Fri May 24th now has CPAP on    2. HTN  - cont meds    3. Sleep apnea   - cont CPAP     Thank you for allowing me to  participate in this patient's care. Please do not hesitate to contact me with any questions or concerns. Consult note has been forwarded to the referral physician.

## 2019-05-06 ENCOUNTER — OFFICE VISIT (OUTPATIENT)
Dept: UROLOGY | Facility: CLINIC | Age: 49
End: 2019-05-06
Payer: COMMERCIAL

## 2019-05-06 ENCOUNTER — OFFICE VISIT (OUTPATIENT)
Dept: URGENT CARE | Facility: CLINIC | Age: 49
End: 2019-05-06
Payer: COMMERCIAL

## 2019-05-06 VITALS
OXYGEN SATURATION: 97 % | BODY MASS INDEX: 29.58 KG/M2 | RESPIRATION RATE: 20 BRPM | SYSTOLIC BLOOD PRESSURE: 114 MMHG | HEIGHT: 67 IN | DIASTOLIC BLOOD PRESSURE: 76 MMHG | TEMPERATURE: 98 F | WEIGHT: 188.5 LBS | HEART RATE: 83 BPM

## 2019-05-06 VITALS — BODY MASS INDEX: 29.44 KG/M2 | WEIGHT: 188 LBS

## 2019-05-06 DIAGNOSIS — Z91.038 ALLERGIC REACTION TO INSECT BITE: Primary | ICD-10-CM

## 2019-05-06 DIAGNOSIS — N20.1 URETERAL STONE: Primary | ICD-10-CM

## 2019-05-06 LAB
BILIRUB SERPL-MCNC: NORMAL MG/DL
BLOOD URINE, POC: NORMAL
COLOR, POC UA: YELLOW
GLUCOSE UR QL STRIP: NORMAL
KETONES UR QL STRIP: NORMAL
LEUKOCYTE ESTERASE URINE, POC: NORMAL
NITRITE, POC UA: NORMAL
PH, POC UA: 6
PROTEIN, POC: NORMAL
SPECIFIC GRAVITY, POC UA: 1.01
UROBILINOGEN, POC UA: NORMAL

## 2019-05-06 PROCEDURE — 3008F PR BODY MASS INDEX (BMI) DOCUMENTED: ICD-10-PCS | Mod: CPTII,S$GLB,, | Performed by: UROLOGY

## 2019-05-06 PROCEDURE — 3008F BODY MASS INDEX DOCD: CPT | Mod: CPTII,S$GLB,, | Performed by: FAMILY MEDICINE

## 2019-05-06 PROCEDURE — 96372 THER/PROPH/DIAG INJ SC/IM: CPT | Mod: S$GLB,,, | Performed by: FAMILY MEDICINE

## 2019-05-06 PROCEDURE — 3008F BODY MASS INDEX DOCD: CPT | Mod: CPTII,S$GLB,, | Performed by: UROLOGY

## 2019-05-06 PROCEDURE — 3078F DIAST BP <80 MM HG: CPT | Mod: CPTII,S$GLB,, | Performed by: UROLOGY

## 2019-05-06 PROCEDURE — 81002 URINALYSIS NONAUTO W/O SCOPE: CPT | Mod: S$GLB,,, | Performed by: UROLOGY

## 2019-05-06 PROCEDURE — 3074F PR MOST RECENT SYSTOLIC BLOOD PRESSURE < 130 MM HG: ICD-10-PCS | Mod: CPTII,S$GLB,, | Performed by: UROLOGY

## 2019-05-06 PROCEDURE — 99214 PR OFFICE/OUTPT VISIT, EST, LEVL IV, 30-39 MIN: ICD-10-PCS | Mod: 25,S$GLB,, | Performed by: FAMILY MEDICINE

## 2019-05-06 PROCEDURE — 99999 PR PBB SHADOW E&M-EST. PATIENT-LVL IV: ICD-10-PCS | Mod: PBBFAC,,, | Performed by: FAMILY MEDICINE

## 2019-05-06 PROCEDURE — 81002 POCT URINE DIPSTICK WITHOUT MICROSCOPE: ICD-10-PCS | Mod: S$GLB,,, | Performed by: UROLOGY

## 2019-05-06 PROCEDURE — 3074F SYST BP LT 130 MM HG: CPT | Mod: CPTII,S$GLB,, | Performed by: UROLOGY

## 2019-05-06 PROCEDURE — 3074F SYST BP LT 130 MM HG: CPT | Mod: CPTII,S$GLB,, | Performed by: FAMILY MEDICINE

## 2019-05-06 PROCEDURE — 3074F PR MOST RECENT SYSTOLIC BLOOD PRESSURE < 130 MM HG: ICD-10-PCS | Mod: CPTII,S$GLB,, | Performed by: FAMILY MEDICINE

## 2019-05-06 PROCEDURE — 3078F PR MOST RECENT DIASTOLIC BLOOD PRESSURE < 80 MM HG: ICD-10-PCS | Mod: CPTII,S$GLB,, | Performed by: FAMILY MEDICINE

## 2019-05-06 PROCEDURE — 3078F PR MOST RECENT DIASTOLIC BLOOD PRESSURE < 80 MM HG: ICD-10-PCS | Mod: CPTII,S$GLB,, | Performed by: UROLOGY

## 2019-05-06 PROCEDURE — 3078F DIAST BP <80 MM HG: CPT | Mod: CPTII,S$GLB,, | Performed by: FAMILY MEDICINE

## 2019-05-06 PROCEDURE — 99214 OFFICE O/P EST MOD 30 MIN: CPT | Mod: 25,S$GLB,, | Performed by: FAMILY MEDICINE

## 2019-05-06 PROCEDURE — 99999 PR PBB SHADOW E&M-EST. PATIENT-LVL III: ICD-10-PCS | Mod: PBBFAC,,, | Performed by: UROLOGY

## 2019-05-06 PROCEDURE — 99214 OFFICE O/P EST MOD 30 MIN: CPT | Mod: 25,S$GLB,, | Performed by: UROLOGY

## 2019-05-06 PROCEDURE — 99999 PR PBB SHADOW E&M-EST. PATIENT-LVL IV: CPT | Mod: PBBFAC,,, | Performed by: FAMILY MEDICINE

## 2019-05-06 PROCEDURE — 96372 PR INJECTION,THERAP/PROPH/DIAG2ST, IM OR SUBCUT: ICD-10-PCS | Mod: S$GLB,,, | Performed by: FAMILY MEDICINE

## 2019-05-06 PROCEDURE — 3008F PR BODY MASS INDEX (BMI) DOCUMENTED: ICD-10-PCS | Mod: CPTII,S$GLB,, | Performed by: FAMILY MEDICINE

## 2019-05-06 PROCEDURE — 99214 PR OFFICE/OUTPT VISIT, EST, LEVL IV, 30-39 MIN: ICD-10-PCS | Mod: 25,S$GLB,, | Performed by: UROLOGY

## 2019-05-06 PROCEDURE — 99999 PR PBB SHADOW E&M-EST. PATIENT-LVL III: CPT | Mod: PBBFAC,,, | Performed by: UROLOGY

## 2019-05-06 RX ORDER — BETAMETHASONE SODIUM PHOSPHATE AND BETAMETHASONE ACETATE 3; 3 MG/ML; MG/ML
6 INJECTION, SUSPENSION INTRA-ARTICULAR; INTRALESIONAL; INTRAMUSCULAR; SOFT TISSUE ONCE
Status: COMPLETED | OUTPATIENT
Start: 2019-05-06 | End: 2019-05-06

## 2019-05-06 RX ADMIN — BETAMETHASONE SODIUM PHOSPHATE AND BETAMETHASONE ACETATE 6 MG: 3; 3 INJECTION, SUSPENSION INTRA-ARTICULAR; INTRALESIONAL; INTRAMUSCULAR; SOFT TISSUE at 12:05

## 2019-05-06 NOTE — PROGRESS NOTES
"Subjective:       Patient ID: Renzo Salgado is a 48 y.o. male.    Chief Complaint: Edema (right arm, possible insect bite)    /76   Pulse 83   Temp 97.7 °F (36.5 °C) (Tympanic)   Resp 20   Ht 5' 7" (1.702 m)   Wt 85.5 kg (188 lb 7.9 oz)   SpO2 97%   BMI 29.52 kg/m²     HPI  Insect bite right elbow yesterday. Very swollen now    Review of Systems   HENT: Negative for trouble swallowing.    Respiratory: Negative for shortness of breath and wheezing.        Objective:      Physical Exam   Constitutional: He is oriented to person, place, and time. He appears well-developed and well-nourished. No distress.   HENT:   Head: Normocephalic and atraumatic.   Eyes: Pupils are equal, round, and reactive to light. EOM are normal.   Neurological: He is alert and oriented to person, place, and time. No cranial nerve deficit.   Skin: Skin is warm and dry. He is not diaphoretic.   Right elbow marked erythematous swelling over entire posterior , area measures over 10 cm in diameter   Nursing note and vitals reviewed.      Assessment:       1. Allergic reaction to insect bite        Plan:     Renzo was seen today for edema.    Diagnoses and all orders for this visit:    Allergic reaction to insect bite  -     betamethasone acetate-betamethasone sodium phosphate injection 6 mg      Over the counter zyrtec 10 mg daily  Benadryl topical cream as needed    "

## 2019-05-06 NOTE — PROGRESS NOTES
Chief Complaint: Hematuria/Kidney Stones    HPI:   5/6/19: KUB/US.  Reviewed history in detail.  10/3/18: Left URS completed and stent is out.  KUB/US reassuring there is a small cyst on left kidney of no concern.  8/27/18: 47 yo man on eliquis saw gross hematuria last month, had a KUB that shows two 5-6mm left renal stones.  He went to Dr. Myers and a CT was done that shows the same stones.  Was scheduled for ESWL and his HR went to 130 during the procedure.  Was sent away for cardiology clearance that was given by cardiologist with low risk.  Happened again at 120 then 80-85 HR and Dr. Myers did not want to schedule it a third time.  Seems no anesthesia was given for the procedures.  ESWL was never started.  No abd/pelvic pain and no exac/rel factors.  No hematuria.  No urolithiasis.  No urinary bother.  No  history.  Normal sexual function.  Referred by Dr. Damico.    Allergies:  Shellfish containing products    Medications:  has a current medication list which includes the following prescription(s): apixaban, diltiazem, lisinopril, and metoprolol succinate.    Review of Systems:  General: No fever, chills, fatigability, or weight loss.  Skin: No rashes, itching, or changes in color or texture of skin.  Chest: Denies HIG[place], cyanosis, wheezing, cough, and sputum production.  Abdomen: Appetite fine. No weight loss. Denies diarrhea, abdominal pain, hematemesis, or blood in stool.  Musculoskeletal: No joint stiffness or swelling. Denies back pain.  : As above.  All other review of systems negative.    PMH:   has a past medical history of Atrial fibrillation, Hypertension, and Sleep apnea.    PSH:   has a past surgical history that includes Upper gastrointestinal endoscopy; Laser lithotripsy (Left, 9/4/2018); Cystoscopy w/ ureteral stent placement (Left, 9/4/2018); and Cardioversion (N/A, 10/5/2018).    FamHx: family history includes Hypertension in his mother.    SocHx:  reports that he has never smoked. He has  never used smokeless tobacco. He reports that he does not drink alcohol or use drugs.      Physical Exam:  There were no vitals filed for this visit.  General: A&Ox3, no apparent distress, no deformities  Neck: No masses, normal thyroid  Lungs: normal inspiration, no use of accessory muscles  Heart: normal pulse, no arrhythmias  Abdomen: Soft, NT, ND  Skin: The skin is warm and dry. No jaundice.  Ext: No c/c/e.  :   8/18: Test desc alejandro, no abnormalities of epididymus. Penis normal, with normal penile and scrotal skin. Meatus normal.     Labs/Studies: Urinalysis performed in clinic, summary: UA normal exc 50 blood  PSA    1/16: 0.77    Impression/Plan:   1. KUB/US/RTC 12 mo, doing well.

## 2019-05-07 DIAGNOSIS — Z12.11 COLON CANCER SCREENING: ICD-10-CM

## 2019-05-08 ENCOUNTER — LAB VISIT (OUTPATIENT)
Dept: LAB | Facility: HOSPITAL | Age: 49
End: 2019-05-08
Attending: INTERNAL MEDICINE
Payer: COMMERCIAL

## 2019-05-08 DIAGNOSIS — Z12.11 COLON CANCER SCREENING: ICD-10-CM

## 2019-05-08 PROCEDURE — 82274 ASSAY TEST FOR BLOOD FECAL: CPT

## 2019-05-09 LAB — HEMOCCULT STL QL IA: NEGATIVE

## 2019-05-16 ENCOUNTER — TELEPHONE (OUTPATIENT)
Dept: CARDIOLOGY | Facility: CLINIC | Age: 49
End: 2019-05-16

## 2019-05-16 NOTE — TELEPHONE ENCOUNTER
Returned call to patient and he requested that his Cardioversion be rescheduled to December 27.  Advised would notify Dr. Alvarado and that a December schedule is really not available yet, also asked about any symptoms and discussed delaying procedure

## 2019-05-16 NOTE — TELEPHONE ENCOUNTER
----- Message from Verónica Kerr LPN sent at 5/16/2019  2:08 PM CDT -----  Contact: pt      ----- Message -----  From: Gabriela Adair  Sent: 5/16/2019   1:57 PM  To: Jenny Del Castillo Staff    Please give pt a call at .[phone] (home) he needs to have his procedure rescheduled

## 2019-05-19 ENCOUNTER — HOSPITAL ENCOUNTER (EMERGENCY)
Facility: HOSPITAL | Age: 49
Discharge: HOME | End: 2019-05-19
Attending: EMERGENCY MEDICINE
Payer: COMMERCIAL

## 2019-05-19 VITALS
WEIGHT: 186.63 LBS | SYSTOLIC BLOOD PRESSURE: 148 MMHG | RESPIRATION RATE: 20 BRPM | HEART RATE: 87 BPM | HEIGHT: 67 IN | DIASTOLIC BLOOD PRESSURE: 86 MMHG | BODY MASS INDEX: 29.29 KG/M2 | TEMPERATURE: 98 F | OXYGEN SATURATION: 98 %

## 2019-05-19 DIAGNOSIS — L50.9 HIVES: Primary | ICD-10-CM

## 2019-05-19 DIAGNOSIS — T78.40XA ALLERGIC REACTION, INITIAL ENCOUNTER: ICD-10-CM

## 2019-05-19 PROCEDURE — 25000003 PHARM REV CODE 250: Performed by: REGISTERED NURSE

## 2019-05-19 PROCEDURE — 96372 THER/PROPH/DIAG INJ SC/IM: CPT

## 2019-05-19 PROCEDURE — 99284 EMERGENCY DEPT VISIT MOD MDM: CPT | Mod: 25

## 2019-05-19 PROCEDURE — 63600175 PHARM REV CODE 636 W HCPCS: Performed by: REGISTERED NURSE

## 2019-05-19 RX ORDER — DIPHENHYDRAMINE HYDROCHLORIDE 50 MG/ML
25 INJECTION INTRAMUSCULAR; INTRAVENOUS
Status: COMPLETED | OUTPATIENT
Start: 2019-05-19 | End: 2019-05-19

## 2019-05-19 RX ORDER — FAMOTIDINE 20 MG/1
20 TABLET, FILM COATED ORAL
Status: COMPLETED | OUTPATIENT
Start: 2019-05-19 | End: 2019-05-19

## 2019-05-19 RX ORDER — PREDNISONE 20 MG/1
40 TABLET ORAL DAILY
Qty: 10 TABLET | Refills: 0 | Status: SHIPPED | OUTPATIENT
Start: 2019-05-19 | End: 2019-05-24

## 2019-05-19 RX ORDER — DIPHENHYDRAMINE HCL 25 MG
25 CAPSULE ORAL
Status: DISCONTINUED | OUTPATIENT
Start: 2019-05-19 | End: 2019-05-19

## 2019-05-19 RX ORDER — PREDNISONE 20 MG/1
40 TABLET ORAL
Status: DISCONTINUED | OUTPATIENT
Start: 2019-05-19 | End: 2019-05-19

## 2019-05-19 RX ORDER — METHYLPREDNISOLONE SOD SUCC 125 MG
125 VIAL (EA) INJECTION
Status: COMPLETED | OUTPATIENT
Start: 2019-05-19 | End: 2019-05-19

## 2019-05-19 RX ADMIN — METHYLPREDNISOLONE SODIUM SUCCINATE 125 MG: 125 INJECTION, POWDER, FOR SOLUTION INTRAMUSCULAR; INTRAVENOUS at 05:05

## 2019-05-19 RX ADMIN — FAMOTIDINE 20 MG: 20 TABLET, FILM COATED ORAL at 05:05

## 2019-05-19 RX ADMIN — DIPHENHYDRAMINE HYDROCHLORIDE 25 MG: 50 INJECTION INTRAMUSCULAR; INTRAVENOUS at 05:05

## 2019-05-19 NOTE — ED PROVIDER NOTES
"SCRIBE #1 NOTE: I, Janette Mcgee, am scribing for, and in the presence of, Jac Watkins NP. I have scribed the entire note.         History     Chief Complaint   Patient presents with    Allergic Reaction     Pt states, "I think I ate something that I am allergic to because I have hives now."       Review of patient's allergies indicates:   Allergen Reactions    Shellfish containing products Hives         History of Present Illness   HPI    5/19/2019, 5:02 PM  History obtained from the patient      History of Present Illness: Renzo Salgado is a 48 y.o. male patient with PMHx of Afib and HTN who presents to the Emergency Department for hives which onset gradually at 4am. Patient reports eating fish and noodles at home for dinner last night. Patient states he woke at 4am with hives. Patient states he took benadryl and sxs improved. Patient states he took a second dose of benadryl at noon and hives resolved, but still had itching. Patient states he ate a burrito for lunch and then hives returned. Patient reports known allergy to shellfish. Patient reports hives to RUE, abdomen, and back. Symptoms are constant and moderate in severity. No mitigating or exacerbating factors reported. Associated sxs include pruritus. Patient denies any fever, chills, voice change, difficulty swallowing, tongue swelling, N/V, CP, stridor, wheezing, SOB, and all other sxs at this time. No further complaints or concerns at this time.     Arrival mode: Personal vehicle      PCP: Fadi Damico MD        Past Medical History:  Past Medical History:   Diagnosis Date    Atrial fibrillation     Hypertension     Sleep apnea        Past Surgical History:  Past Surgical History:   Procedure Laterality Date    CARDIOVERSION N/A 10/5/2018    Performed [facility] at [facility] C[facility] LAB    CYSTOSCOPY, WITH URETERAL STENT INSERTION Left 9/4/2018    Performed by Ricky Baker IV, MD at [facility] OR    ECHOCARDIOGRAM,TRANSESOPHAGEAL N/A " 10/5/2018    Performed by Abundio Alvarado MD at [facility] CATH LAB    EXTRACTION - STONE Left 9/4/2018    Performed by Ricky Baker IV, MD at [facility] OR    LITHOTRIPSY, USING LASER Left 9/4/2018    Performed by Ricky [facility] IV, MD at [facility] OR    UPPER GASTROINTESTINAL ENDOSCOPY           Family History:  Family History   Problem Relation Age of Onset    Hypertension Mother     Melanoma Neg Hx     Psoriasis Neg Hx     Lupus Neg Hx        Social History:  Social History     Tobacco Use    Smoking status: Never Smoker    Smokeless tobacco: Never Used   Substance and Sexual Activity    Alcohol use: No    Drug use: No    Sexual activity: Unknown        Review of Systems   Review of Systems   Constitutional: Negative for chills and fever.   HENT: Negative for sore throat, trouble swallowing and voice change.         (-) tongue swelling   Respiratory: Negative for shortness of breath, wheezing and stridor.    Cardiovascular: Negative for chest pain.   Gastrointestinal: Negative for nausea and vomiting.   Genitourinary: Negative for dysuria.   Musculoskeletal: Negative for back pain.   Skin: Positive for rash (hives to RUE, abdomen, and back).   Neurological: Negative for weakness.   Hematological: Does not bruise/bleed easily.   All other systems reviewed and are negative.       Physical Exam     Initial Vitals [05/19/19 1652]   BP Pulse Resp Temp SpO2   (!) 150/92 91 20 98.4 °F (36.9 °C) 98 %      MAP       --          Physical Exam  Nursing Notes and Vital Signs Reviewed.  Constitutional: Patient is in no acute distress. Well-developed and well-nourished.  Head: Atraumatic. Normocephalic.  Eyes: PERRL. EOM intact. Conjunctivae are not pale. No scleral icterus.  ENT: Mucous membranes are moist. Oropharynx is clear and symmetric. Airway patent.  Neck: Supple. Full ROM. No lymphadenopathy.  Cardiovascular: Regular rate. Regular rhythm. No murmurs, rubs, or gallops. Distal pulses are 2+ and  "symmetric.  Pulmonary/Chest: No respiratory distress. Clear to auscultation bilaterally. No wheezing or rales.  Abdominal: Soft and non-distended.  There is no tenderness.  No rebound, guarding, or rigidity.   Musculoskeletal: Moves all extremities. No obvious deformities. No edema.  Skin: Warm and dry. Diffuse urticarial rash to RUE from AC to wrist, trunk, and lower abdomen.  Neurological:  Alert, awake, and appropriate.  Normal speech.  No acute focal neurological deficits are appreciated.  Psychiatric: Normal affect. Good eye contact. Appropriate in content.     ED Course   Procedures  ED Vital Signs:  Vitals:    05/19/19 1652   BP: (!) 150/92   Pulse: 91   Resp: 20   Temp: 98.4 °F (36.9 °C)   TempSrc: Oral   SpO2: 98%   Weight: 84.6 kg (186 lb 9.9 oz)   Height: 5' 7" (1.702 m)             The Emergency Provider reviewed the vital signs and test results, which are outlined above.     ED Discussion     Patient states he has cardioversion scheduled for 5/24 with Dr. Alvarado (Cardiology). Advised patient to call cardiologist to let them know that he is having allergic reaction and had to be evaluated in ED and was given steroids.     6:14 PM: Reassessed pt at this time.  Pt states his condition has improved at this time. On re-eval, hives have improved. Discussed with pt all pertinent ED information. Discussed pt dx and plan of tx. Gave pt all f/u and return to the ED instructions. All questions and concerns were addressed at this time. Pt expresses understanding of information and instructions, and is comfortable with plan to discharge. Pt is stable for discharge.    Patient is safe for discharge. There is no suggestion of airway or ENT emergency. Patient is hemodynamically stable and there is no suggestion of active anaphylaxis or progressive worsening of current symptoms.    I discussed with patient and/or family/caretaker that evaluation in the ED does not suggest any emergent or life threatening medical " conditions requiring immediate intervention beyond what was provided in the ED, and I believe patient is safe for discharge.  Regardless, an unremarkable evaluation in the ED does not preclude the development or presence of a serious of life threatening condition. As such, patient was instructed to return immediately for any worsening or change in current symptoms.          ED Medication(s):  Medications   famotidine tablet 20 mg (20 mg Oral Given 5/19/19 1712)   methylPREDNISolone sodium succinate injection 125 mg (125 mg Intramuscular Given 5/19/19 1712)   diphenhydrAMINE injection 25 mg (25 mg Intramuscular Given 5/19/19 1712)     New Prescriptions    PREDNISONE (DELTASONE) 20 MG TABLET    Take 2 tablets (40 mg total) by mouth once daily. for 5 days       Follow-up Information     Fadi Damico MD In 3 days.    Specialty:  Internal Medicine  Contact information:  [address]  [place] [address]  [phone]             [facility]    Specialty:  Emergency Medicine  Why:  If symptoms worsen  Contact information:  [address] [facility] [address] [phone]                      Medical Decision Making                 Scribe Attestation:   Scribe #1: I performed the above scribed service and the documentation accurately describes the services I performed. I attest to the accuracy of the note.     Attending:   Physician Attestation Statement for Scribe #1: I, Jac Watkins, NP3, personally performed the services described in this documentation, as scribed by Janette Mcgee, in my presence, and it is both accurate and complete.           Clinical Impression       ICD-10-CM ICD-9-CM   1. Hives L50.9 708.9   2. Allergic reaction, initial encounter T78.40XA 995.3       Disposition:   Disposition: Discharged  Condition: Stable         Jac Watkins Jr., [facility]  05/20/19 1114

## 2019-05-19 NOTE — ED NOTES
"Anesthesia Transfer of Care Note    Patient: Val Guzman    Procedure(s) Performed: Procedure(s) (LRB):   SECTION (N/A)    Patient location: PACU    Anesthesia Type: epidural    Transport from OR: Transported from OR on room air with adequate spontaneous ventilation    Post pain: adequate analgesia    Post assessment: no apparent anesthetic complications    Post vital signs: stable    Level of consciousness: awake, alert and oriented    Nausea/Vomiting: no nausea/vomiting    Complications: none    Transfer of care protocol was followed      Last vitals:   Visit Vitals  /72   Pulse 110   Temp 35.8 °C (96.4 °F) (Temporal)   Resp 18   Ht 5' 1" (1.549 m)   Wt 103.4 kg (228 lb)   LMP 2018   SpO2 100%   Breastfeeding? No   BMI 43.08 kg/m²     " Hives are receeding

## 2019-05-20 ENCOUNTER — OFFICE VISIT (OUTPATIENT)
Dept: URGENT CARE | Facility: CLINIC | Age: 49
End: 2019-05-20
Payer: COMMERCIAL

## 2019-05-20 ENCOUNTER — PATIENT MESSAGE (OUTPATIENT)
Dept: INTERNAL MEDICINE | Facility: CLINIC | Age: 49
End: 2019-05-20

## 2019-05-20 ENCOUNTER — HOSPITAL ENCOUNTER (EMERGENCY)
Facility: HOSPITAL | Age: 49
Discharge: HOME | End: 2019-05-20
Attending: EMERGENCY MEDICINE
Payer: COMMERCIAL

## 2019-05-20 VITALS
OXYGEN SATURATION: 96 % | TEMPERATURE: 98 F | SYSTOLIC BLOOD PRESSURE: 120 MMHG | DIASTOLIC BLOOD PRESSURE: 78 MMHG | BODY MASS INDEX: 29.42 KG/M2 | HEIGHT: 67 IN | HEART RATE: 99 BPM | WEIGHT: 187.44 LBS

## 2019-05-20 VITALS
HEIGHT: 66 IN | RESPIRATION RATE: 18 BRPM | HEART RATE: 90 BPM | OXYGEN SATURATION: 96 % | WEIGHT: 186.31 LBS | TEMPERATURE: 98 F | DIASTOLIC BLOOD PRESSURE: 74 MMHG | SYSTOLIC BLOOD PRESSURE: 130 MMHG | BODY MASS INDEX: 29.94 KG/M2

## 2019-05-20 DIAGNOSIS — L50.9 URTICARIA OF UNKNOWN ORIGIN: Primary | ICD-10-CM

## 2019-05-20 DIAGNOSIS — L50.9 HIVES: Primary | ICD-10-CM

## 2019-05-20 PROCEDURE — 3008F PR BODY MASS INDEX (BMI) DOCUMENTED: ICD-10-PCS | Mod: CPTII,S$GLB,, | Performed by: FAMILY MEDICINE

## 2019-05-20 PROCEDURE — 99214 OFFICE O/P EST MOD 30 MIN: CPT | Mod: 25,S$GLB,, | Performed by: FAMILY MEDICINE

## 2019-05-20 PROCEDURE — 99214 PR OFFICE/OUTPT VISIT, EST, LEVL IV, 30-39 MIN: ICD-10-PCS | Mod: 25,S$GLB,, | Performed by: FAMILY MEDICINE

## 2019-05-20 PROCEDURE — 3078F PR MOST RECENT DIASTOLIC BLOOD PRESSURE < 80 MM HG: ICD-10-PCS | Mod: CPTII,S$GLB,, | Performed by: FAMILY MEDICINE

## 2019-05-20 PROCEDURE — 3008F BODY MASS INDEX DOCD: CPT | Mod: CPTII,S$GLB,, | Performed by: FAMILY MEDICINE

## 2019-05-20 PROCEDURE — 3078F DIAST BP <80 MM HG: CPT | Mod: CPTII,S$GLB,, | Performed by: FAMILY MEDICINE

## 2019-05-20 PROCEDURE — 99283 EMERGENCY DEPT VISIT LOW MDM: CPT

## 2019-05-20 PROCEDURE — 96372 THER/PROPH/DIAG INJ SC/IM: CPT | Mod: S$GLB,,, | Performed by: FAMILY MEDICINE

## 2019-05-20 PROCEDURE — 99999 PR PBB SHADOW E&M-EST. PATIENT-LVL IV: ICD-10-PCS | Mod: PBBFAC,,, | Performed by: FAMILY MEDICINE

## 2019-05-20 PROCEDURE — 25000003 PHARM REV CODE 250: Performed by: REGISTERED NURSE

## 2019-05-20 PROCEDURE — 3074F SYST BP LT 130 MM HG: CPT | Mod: CPTII,S$GLB,, | Performed by: FAMILY MEDICINE

## 2019-05-20 PROCEDURE — 96372 PR INJECTION,THERAP/PROPH/DIAG2ST, IM OR SUBCUT: ICD-10-PCS | Mod: S$GLB,,, | Performed by: FAMILY MEDICINE

## 2019-05-20 PROCEDURE — 3074F PR MOST RECENT SYSTOLIC BLOOD PRESSURE < 130 MM HG: ICD-10-PCS | Mod: CPTII,S$GLB,, | Performed by: FAMILY MEDICINE

## 2019-05-20 PROCEDURE — 99999 PR PBB SHADOW E&M-EST. PATIENT-LVL IV: CPT | Mod: PBBFAC,,, | Performed by: FAMILY MEDICINE

## 2019-05-20 RX ORDER — PREDNISONE 20 MG/1
40 TABLET ORAL
Status: DISCONTINUED | OUTPATIENT
Start: 2019-05-20 | End: 2019-05-20

## 2019-05-20 RX ORDER — EPINEPHRINE 0.3 MG/.3ML
1 INJECTION SUBCUTANEOUS
Qty: 1 DEVICE | Refills: 1 | Status: SHIPPED | OUTPATIENT
Start: 2019-05-20 | End: 2023-03-10

## 2019-05-20 RX ORDER — FAMOTIDINE 20 MG/1
20 TABLET, FILM COATED ORAL
Status: COMPLETED | OUTPATIENT
Start: 2019-05-20 | End: 2019-05-20

## 2019-05-20 RX ORDER — METHYLPREDNISOLONE ACETATE 80 MG/ML
80 INJECTION, SUSPENSION INTRA-ARTICULAR; INTRALESIONAL; INTRAMUSCULAR; SOFT TISSUE
Status: DISCONTINUED | OUTPATIENT
Start: 2019-05-20 | End: 2019-05-20 | Stop reason: ALTCHOICE

## 2019-05-20 RX ADMIN — FAMOTIDINE 20 MG: 20 TABLET, FILM COATED ORAL at 09:05

## 2019-05-20 RX ADMIN — METHYLPREDNISOLONE ACETATE 80 MG: 80 INJECTION, SUSPENSION INTRA-ARTICULAR; INTRALESIONAL; INTRAMUSCULAR; SOFT TISSUE at 02:05

## 2019-05-20 NOTE — TELEPHONE ENCOUNTER
"Received phone call from patient stating he had [facility] ER yesterday and received "steroids" and would now like to schedule RICARDO in 3 months versus December.  Advised he would need to see Dr. Alvarado for re-evaluation and then scheduling, confirmed contact number and will notify MD  "

## 2019-05-20 NOTE — PROGRESS NOTES
"Subjective:       Patient ID: Renzo Salgado is a 48 y.o. male.    Chief Complaint: Urticaria    /78   Pulse 99   Temp 97.8 °F (36.6 °C)   Ht 5' 7" (1.702 m)   Wt 85 kg (187 lb 6.6 oz)   SpO2 96%   BMI 29.35 kg/m²     HPI  See on 5/6 for urticaria here, yesterday in ER, and occurred again today after a lunch of rice and asparagus, on bilateral LE . Each time condition resolves after standard treatment    Review of Systems   HENT: Negative for trouble swallowing.    Respiratory: Negative for shortness of breath and wheezing.    Gastrointestinal: Negative.  Negative for abdominal pain.       Objective:      Physical Exam   Constitutional: He is oriented to person, place, and time. He appears well-developed and well-nourished. No distress.   HENT:   Head: Normocephalic and atraumatic.   Eyes: Pupils are equal, round, and reactive to light. EOM are normal.   Cardiovascular: Normal rate.   Pulmonary/Chest: He has no wheezes. He has no rales.   Abdominal: Soft.   Neurological: He is alert and oriented to person, place, and time. No cranial nerve deficit.   Skin: Skin is warm and dry. He is not diaphoretic.   Urticaria on ankles thighs   Nursing note and vitals reviewed.      Assessment:       1. Urticaria of unknown origin        Plan:     Renzo was seen today for urticaria.    Diagnoses and all orders for this visit:    Urticaria of unknown origin  -     methylPREDNISolone acetate injection 80 mg  -     Ambulatory referral to Allergy      1. Continue oral pepcid and benedryl  2. Resume prednisone tomorrow  3. Refer to allergy clinic  4. Go to ER if condition worsens or trouble breathing occurs    "

## 2019-05-20 NOTE — PATIENT INSTRUCTIONS
1. Continue oral pepcid and benedryl  2. Resume prednisone tomorrow  3. Refer to allergy clinic  4. Go to ER if condition worsens or trouble breathing occurs

## 2019-05-21 ENCOUNTER — OFFICE VISIT (OUTPATIENT)
Dept: INTERNAL MEDICINE | Facility: CLINIC | Age: 49
End: 2019-05-21
Payer: COMMERCIAL

## 2019-05-21 VITALS
SYSTOLIC BLOOD PRESSURE: 124 MMHG | WEIGHT: 186.31 LBS | TEMPERATURE: 98 F | HEART RATE: 95 BPM | OXYGEN SATURATION: 98 % | BODY MASS INDEX: 30.07 KG/M2 | DIASTOLIC BLOOD PRESSURE: 87 MMHG

## 2019-05-21 DIAGNOSIS — L50.9 HIVES: ICD-10-CM

## 2019-05-21 DIAGNOSIS — R22.0 LIP SWELLING: Primary | ICD-10-CM

## 2019-05-21 DIAGNOSIS — I10 ESSENTIAL HYPERTENSION: ICD-10-CM

## 2019-05-21 PROCEDURE — 3008F BODY MASS INDEX DOCD: CPT | Mod: CPTII,S$GLB,, | Performed by: INTERNAL MEDICINE

## 2019-05-21 PROCEDURE — 3074F SYST BP LT 130 MM HG: CPT | Mod: CPTII,S$GLB,, | Performed by: INTERNAL MEDICINE

## 2019-05-21 PROCEDURE — 3008F PR BODY MASS INDEX (BMI) DOCUMENTED: ICD-10-PCS | Mod: CPTII,S$GLB,, | Performed by: INTERNAL MEDICINE

## 2019-05-21 PROCEDURE — 99999 PR PBB SHADOW E&M-EST. PATIENT-LVL III: CPT | Mod: PBBFAC,,, | Performed by: INTERNAL MEDICINE

## 2019-05-21 PROCEDURE — 99999 PR PBB SHADOW E&M-EST. PATIENT-LVL III: ICD-10-PCS | Mod: PBBFAC,,, | Performed by: INTERNAL MEDICINE

## 2019-05-21 PROCEDURE — 3079F DIAST BP 80-89 MM HG: CPT | Mod: CPTII,S$GLB,, | Performed by: INTERNAL MEDICINE

## 2019-05-21 PROCEDURE — 3074F PR MOST RECENT SYSTOLIC BLOOD PRESSURE < 130 MM HG: ICD-10-PCS | Mod: CPTII,S$GLB,, | Performed by: INTERNAL MEDICINE

## 2019-05-21 PROCEDURE — 99214 PR OFFICE/OUTPT VISIT, EST, LEVL IV, 30-39 MIN: ICD-10-PCS | Mod: S$GLB,,, | Performed by: INTERNAL MEDICINE

## 2019-05-21 PROCEDURE — 99214 OFFICE O/P EST MOD 30 MIN: CPT | Mod: S$GLB,,, | Performed by: INTERNAL MEDICINE

## 2019-05-21 PROCEDURE — 3079F PR MOST RECENT DIASTOLIC BLOOD PRESSURE 80-89 MM HG: ICD-10-PCS | Mod: CPTII,S$GLB,, | Performed by: INTERNAL MEDICINE

## 2019-05-21 RX ORDER — VALSARTAN 80 MG/1
80 TABLET ORAL DAILY
Qty: 90 TABLET | Refills: 3 | Status: SHIPPED | OUTPATIENT
Start: 2019-05-21 | End: 2020-04-24 | Stop reason: SDUPTHER

## 2019-05-21 NOTE — PROGRESS NOTES
Subjective:      Patient ID: Rnezo Salgado is a 48 y.o. male.    Chief Complaint: Allergic Reaction    Rash   This is a new problem. Episode onset: 3 days. The problem has been gradually improving since onset. The affected locations include the abdomen, right lower leg, right upper leg, left upper leg, left lower leg, right arm and left arm. The rash is characterized by itchiness and redness (hives). He was exposed to nothing. Pertinent negatives include no cough, facial edema, fever, rhinorrhea, shortness of breath or sore throat. Past treatments include nothing. The treatment provided no relief. There is no history of asthma or eczema.      Reports feeling sensation of lip swelling and chin swelling prior to rash. Did not have visible swelling. No tongue or facial swelling. Thinks this may be related to eating fish. Reports allergy to shell fish.   Benadryl is making him drowsy.   This morning he took pepcid and 40mg prednisone.   Review of Systems   Constitutional: Negative for chills and fever.   HENT: Negative for ear pain, rhinorrhea and sore throat.    Respiratory: Negative for cough, choking, chest tightness, shortness of breath and wheezing.    Cardiovascular: Negative for chest pain, palpitations and leg swelling.   Gastrointestinal: Negative for abdominal pain and blood in stool.   Genitourinary: Negative for dysuria and hematuria.   Skin: Positive for rash. Negative for wound.   Neurological: Negative for seizures and syncope.     Objective:   /87   Pulse 95   Temp 97.5 °F (36.4 °C) (Tympanic)   Wt 84.5 kg (186 lb 4.6 oz)   SpO2 98%   BMI 30.07 kg/m²     Physical Exam   Constitutional: He is oriented to person, place, and time. He appears well-developed and well-nourished. No distress.   HENT:   Head: Normocephalic and atraumatic.   Mouth/Throat: Oropharynx is clear and moist. No oropharyngeal exudate.   No edema to lips, face, or tongue   Eyes: Pupils are equal, round, and reactive to  light. EOM are normal.   Cardiovascular: Normal rate and regular rhythm.   Pulmonary/Chest: Breath sounds normal. No respiratory distress. He has no wheezes. He has no rales.   Musculoskeletal: He exhibits no edema.   Lymphadenopathy:     He has no cervical adenopathy.   Neurological: He is alert and oriented to person, place, and time.   Skin: Skin is warm and dry.   Faint rash consistent with urticaria to abdomen arms and legs.    Psychiatric: He has a normal mood and affect. His behavior is normal.       Assessment:     1. Lip swelling    2. Essential hypertension    3. Hives      Plan:   Lip swelling  Dc lisinopril   Has appt with allergy tomorrow    Essential hypertension  -     valsartan (DIOVAN) 80 MG tablet; Take 1 tablet (80 mg total) by mouth once daily.  Dispense: 90 tablet; Refill: 3    Hives    Zyrtec 10mg in morning    pepcid twice a day    Benadryl 25mg to 50mg every 6 hours as needed for hives. Remember benadryl can make you drowsy.         Lab Frequency Next Occurrence   X-Ray Abdomen AP 1 View Once 05/06/2019   US Retroperitoneal Complete (Kidney and Once 05/06/2019       Problem List Items Addressed This Visit        ENT    Lip swelling - Primary    Overview     2019, felt swelling of lower lip(could not see swelling)(no tongue or facial swelling), had assoc hives. Lisinopril dc'd.               Cardiac/Vascular    Essential hypertension    Relevant Medications    valsartan (DIOVAN) 80 MG tablet      Other Visit Diagnoses     Hives              Follow up in about 1 month (around 6/18/2019), or if symptoms worsen or fail to improve.

## 2019-05-21 NOTE — PATIENT INSTRUCTIONS
Zyrtec 10mg in morning    pepcid twice a day    Benadryl 25mg to 50mg every 6 hours as needed for hives. Remember benadryl can make you drowsy.   Hives (Adult)  Hives are pink or red bumps on the skin. These bumps are also known as wheals. The bumps can itch, burn, or sting. Hives can occur anywhere on the body. They vary in size and shape and can form in clusters. Individual hives can appear and go away quickly. New hives may develop as old ones fade. Hives are common and usually harmless. Occasionally hives are a sign of a serious allergy.  Hives are often caused by an allergic reaction. It may be an allergic reaction to foods such as fruit, shellfish, chocolate, nuts, or tomatoes. It may be a reaction to pollens, animal fur, or mold spores. Medicines, chemicals, and insect bites can also cause hives. And hives can be caused by hot sun or cold air. The cause of hives can be difficult to find.  You may be given medicines to relieve swelling and itching. Follow all instructions when using these medicines. The hives will usually fade in a few days, but can last up to 2 weeks.  Home care  Follow these tips:  · Try to find the cause of the hives and eliminate it. Discuss possible causes with your healthcare provider. Future reactions to the same allergen may be worse.  · Dont scratch the hives. Scratching will delay healing. To reduce itching, apply cool, wet compresses to the skin.  · Dress in soft, loose cotton clothing.  · Dont bathe in hot water. This can make the itching worse.  · Apply an ice pack or cool pack wrapped in a thin towel to your skin. This will help reduce redness and itching. But if your hives were caused by exposure to cold, then do not apply more cold to them.  · You may use over-the counter antihistamines to reduce itching. Some older antihistamines, such as diphenhydramine and chlorpheniramine, are inexpensive. But they need to be taken often and may make you sleepy. They are best used at  bedtime. Dont use diphenhydramine if you have glaucoma or have trouble urinating because of an enlarged prostate. Newer antihistamines, such as loratadine, cetirizine, and fexofenadine, are generally more expensive. But they tend to have fewer side effects, such as drowsiness. They can be taken less often.  · Another type of antihistamine is used to treat heartburn. This type includes ranitidine, nizatidine, famotidine, and cimetidine. These are sometimes used along with the above antihistamines if a single medicine is not working.  Follow-up care  Follow up with your healthcare provider if your symptoms don't get better in 2 days. Ask your provider about allergy testing if you have had a severe reaction, or have had several episodes of hives. He or she can use the allergy testing to find out what you are allergic to.  When to seek medical advice  Call your healthcare provider right away if any of these occur:  · Fever of 100.4°F (38.0°C) or higher, or as directed by your healthcare provider  · Redness, swelling, or pain  · Foul-smelling fluid coming from the rash  Call 911  Call 911 if any of the following occur:  · Swelling of the face, throat, or tongue  · Trouble breathing or swallowing  · Dizziness, weakness, or fainting  Date Last Reviewed: 9/1/2016  © 4579-1861 [employer]. [address] [place] [address]. All rights reserved. This information is not intended as a substitute for professional medical care. Always follow your healthcare professional's instructions.

## 2019-05-21 NOTE — ED PROVIDER NOTES
SCRIBE #1 NOTE: I, Tory Hannon, am scribing for, and in the presence of, Jac Watkins NP. I have scribed the entire note.      History      Chief Complaint   Patient presents with    Allergic Reaction     seen yesterday for hives and itching after eating fish and noodles. was discharged, reports symptoms started again around 1830.        Review of patient's allergies indicates:   Allergen Reactions    Shellfish containing products Hives        HPI   HPI    5/20/2019, 8:36 PM   History obtained from the patient      History of Present Illness: Renzo Salgado is a 48 y.o. male patient with PMHx of A-fib and HTN who presents to the Emergency Department for evaluation of recurrent hives to BUE and abdomen. Pt was seen yesterday in ED for hives to RUE, abdomen, and back and was discharged with prednisone. Pt reports sxs started again today after eating rice and asparagus for lunch, was seen at urgent care, and was given steroid shot. Pt reports sxs improved until around 5:30PM when he woke up from a nap. Pt reports his last dose of Benadryl was around 6PM. Pt reports having an appointment with his PCP tomorrow and appointment with Cardiology on 5/24. No mitigating or exacerbating factors reported. Patient denies any rash, sore throat, trouble swallowing, voice changes, chest pain or tightness, shortness of breath, abdominal pain, nausea, or vomiting, and all other sxs at this time. No further complaints or concerns at this time.     Arrival mode: Personal vehicle     PCP: Fadi Damico MD       Past Medical History:  Past Medical History:   Diagnosis Date    Atrial fibrillation     Hypertension     Sleep apnea        Past Surgical History:  Past Surgical History:   Procedure Laterality Date    CARDIOVERSION N/A 10/5/2018    Per[facility] by [facility] at [facility] CATH LAB    CYSTOSCOPY, WITH URETERAL STENT INSERTION Left 9/4/2018    Performed by Ricky [facility] IV, MD at [facility] OR     ECHOCARDIOGRAM,TRANSESOPHAGEAL N/A 10/5/2018    Performed by [facility]io [facility]ado MD at [facility] CATH LAB    EXTRACTION - STONE Left 9/4/2018    Performed by Ricky Baker IV, MD at [facility] OR    LITHOTRIPSY, USING LASER Left 9/4/2018    Performed by Ricky [facility] IV, MD at [facility] OR    UPPER GASTROINTESTINAL ENDOSCOPY           Family History:  Family History   Problem Relation Age of Onset    Hypertension Mother     Melanoma Neg Hx     Psoriasis Neg Hx     Lupus Neg Hx        Social History:  Social History     Tobacco Use    Smoking status: Never Smoker    Smokeless tobacco: Never Used   Substance and Sexual Activity    Alcohol use: No    Drug use: No    Sexual activity: Not given       ROS   Review of Systems   Constitutional: Negative for chills, diaphoresis and fever.   HENT: Negative for congestion and sore throat.    Respiratory: Negative for cough and shortness of breath.    Cardiovascular: Negative for chest pain, palpitations and leg swelling.   Gastrointestinal: Negative for abdominal pain, diarrhea, nausea and vomiting.   Genitourinary: Negative for dysuria and hematuria.   Musculoskeletal: Negative for back pain and neck pain.   Skin: Positive for rash (Hives to BUE and abdomen).   Neurological: Negative for dizziness, syncope, weakness, numbness and headaches.   All other systems reviewed and are negative.      Physical Exam      Initial Vitals [05/20/19 1959]   BP Pulse Resp Temp SpO2   130/74 90 18 98.2 °F (36.8 °C) 96 %      MAP       --          Physical Exam  Nursing Notes and Vital Signs Reviewed.  Constitutional: Patient is in no acute distress. Well-developed and well-nourished.  Head: Atraumatic. Normocephalic.  Eyes: PERRL. EOM intact. Conjunctivae are not pale. No scleral icterus.  ENT: Mucous membranes are moist. Oropharynx is clear and symmetric.    Neck: Supple. Full ROM. No lymphadenopathy.  Cardiovascular: Regular rate. Regular rhythm. No murmurs, rubs, or gallops. Distal pulses are  "2+ and symmetric.  Pulmonary/Chest: No respiratory distress. Clear to auscultation bilaterally. No wheezing or rales.  Abdominal: Soft and non-distended.  There is  tenderness.  No rebound, guarding, or rigidity. Good bowel sounds.  Genitourinary: No CVA tenderness  Musculoskeletal: Moves all extremities. No obvious deformities. No edema. No calf tenderness.  Skin: Warm and dry. Diffuse urticaria to BUE and abdomen. No angioedema. No lip swelling  Neurological:  Alert, awake, and appropriate.  Normal speech.  No acute focal neurological deficits are appreciated.  Psychiatric: Normal affect. Good eye contact. Appropriate in content.    ED Course    Procedures  ED Vital Signs:  Vitals:    05/20/19 1959   BP: 130/74   Pulse: 90   Resp: 18   Temp: 98.2 °F (36.8 °C)   TempSrc: Oral   SpO2: 96%   Weight: 84.5 kg (186 lb 4.6 oz)   Height: 5' 6" (1.676 m)              The Emergency Provider reviewed the vital signs and test results, which are outlined above.    ED Discussion     9:38 PM Advised pt to keep appointment with PCP tomorrow for f/u. Pt is awake, alert, and in NAD at this time. Discussed with pt all pertinent ED information and results. Discussed pt dx and plan of tx. Gave pt all f/u and return to the ED instructions. All questions and concerns were addressed at this time. Pt expresses understanding of information and instructions, and is comfortable with plan to discharge. Pt is stable for discharge.      Patient is safe for discharge. There is no suggestion of airway or ENT emergency. Patient is hemodynamically stable and there is no suggestion of active anaphylaxis or progressive worsening of current symptoms.      ED Medication(s):  Medications   famotidine tablet 20 mg (20 mg Oral Given 5/20/19 2112)       Discharge Medication List as of 5/20/2019  9:19 PM      START taking these medications    Details   EPINEPHrine (EPIPEN) 0.3 mg/0.3 mL AtIn Inject 0.3 mLs (0.3 mg total) into the muscle as needed., Starting " Mon 5/20/2019, Until Tue 5/19/2020, Normal             Follow-up Information     [facility] - .    Specialty:  Emergency Medicine  Why:  If symptoms worsen  Contact information:  [address]  [facility] [address] [phone]                   Medical Decision Making              Scribe Attestation:   Scribe #1: I performed the above scribed service and the documentation accurately describes the services I performed. I attest to the accuracy of the note.    Attending:   Physician Attestation Statement for Scribe #1: I, Jac Watkins, CHIQUITA, personally performed the services described in this documentation, as scribed by Tory Hannon, in my presence, and it is both accurate and complete.          Clinical Impression       ICD-10-CM ICD-9-CM   1. Hives L50.9 708.9       Disposition:   Disposition: Discharged  Condition: Stable           Jac Watkins Jr., [facility]  05/20/19 6782

## 2019-05-22 ENCOUNTER — OFFICE VISIT (OUTPATIENT)
Dept: ALLERGY | Facility: CLINIC | Age: 49
End: 2019-05-22
Payer: COMMERCIAL

## 2019-05-22 ENCOUNTER — LAB VISIT (OUTPATIENT)
Dept: LAB | Facility: HOSPITAL | Age: 49
End: 2019-05-22
Attending: ALLERGY & IMMUNOLOGY
Payer: COMMERCIAL

## 2019-05-22 VITALS
HEART RATE: 78 BPM | DIASTOLIC BLOOD PRESSURE: 70 MMHG | RESPIRATION RATE: 15 BRPM | TEMPERATURE: 98 F | BODY MASS INDEX: 28.93 KG/M2 | SYSTOLIC BLOOD PRESSURE: 110 MMHG | WEIGHT: 184.31 LBS | HEIGHT: 67 IN

## 2019-05-22 DIAGNOSIS — Z91.018 FOOD ALLERGY: ICD-10-CM

## 2019-05-22 DIAGNOSIS — L50.9 URTICARIA, UNSPECIFIED: ICD-10-CM

## 2019-05-22 DIAGNOSIS — G47.33 OSA (OBSTRUCTIVE SLEEP APNEA): ICD-10-CM

## 2019-05-22 DIAGNOSIS — R60.9 SWELLING: ICD-10-CM

## 2019-05-22 DIAGNOSIS — Z91.018 FOOD ALLERGY: Primary | ICD-10-CM

## 2019-05-22 DIAGNOSIS — I48.0 PAF (PAROXYSMAL ATRIAL FIBRILLATION): ICD-10-CM

## 2019-05-22 DIAGNOSIS — L29.9 ITCHING: ICD-10-CM

## 2019-05-22 LAB
THYROGLOB AB SERPL IA-ACNC: <4 IU/ML (ref 0–3.9)
THYROPEROXIDASE IGG SERPL-ACNC: <6 IU/ML
TSH SERPL DL<=0.005 MIU/L-ACNC: 1.81 UIU/ML (ref 0.4–4)

## 2019-05-22 PROCEDURE — 84165 PROTEIN E-PHORESIS SERUM: CPT

## 2019-05-22 PROCEDURE — 86003 ALLG SPEC IGE CRUDE XTRC EA: CPT

## 2019-05-22 PROCEDURE — 99999 PR PBB SHADOW E&M-EST. PATIENT-LVL III: ICD-10-PCS | Mod: PBBFAC,,, | Performed by: ALLERGY & IMMUNOLOGY

## 2019-05-22 PROCEDURE — 3008F BODY MASS INDEX DOCD: CPT | Mod: CPTII,S$GLB,, | Performed by: ALLERGY & IMMUNOLOGY

## 2019-05-22 PROCEDURE — 86038 ANTINUCLEAR ANTIBODIES: CPT

## 2019-05-22 PROCEDURE — 86800 THYROGLOBULIN ANTIBODY: CPT

## 2019-05-22 PROCEDURE — 3074F PR MOST RECENT SYSTOLIC BLOOD PRESSURE < 130 MM HG: ICD-10-PCS | Mod: CPTII,S$GLB,, | Performed by: ALLERGY & IMMUNOLOGY

## 2019-05-22 PROCEDURE — 86162 COMPLEMENT TOTAL (CH50): CPT

## 2019-05-22 PROCEDURE — 36415 COLL VENOUS BLD VENIPUNCTURE: CPT

## 2019-05-22 PROCEDURE — 99205 PR OFFICE/OUTPT VISIT, NEW, LEVL V, 60-74 MIN: ICD-10-PCS | Mod: S$GLB,,, | Performed by: ALLERGY & IMMUNOLOGY

## 2019-05-22 PROCEDURE — 86376 MICROSOMAL ANTIBODY EACH: CPT

## 2019-05-22 PROCEDURE — 84443 ASSAY THYROID STIM HORMONE: CPT

## 2019-05-22 PROCEDURE — 99205 OFFICE O/P NEW HI 60 MIN: CPT | Mod: S$GLB,,, | Performed by: ALLERGY & IMMUNOLOGY

## 2019-05-22 PROCEDURE — 3074F SYST BP LT 130 MM HG: CPT | Mod: CPTII,S$GLB,, | Performed by: ALLERGY & IMMUNOLOGY

## 2019-05-22 PROCEDURE — 99999 PR PBB SHADOW E&M-EST. PATIENT-LVL III: CPT | Mod: PBBFAC,,, | Performed by: ALLERGY & IMMUNOLOGY

## 2019-05-22 PROCEDURE — 3078F DIAST BP <80 MM HG: CPT | Mod: CPTII,S$GLB,, | Performed by: ALLERGY & IMMUNOLOGY

## 2019-05-22 PROCEDURE — 3078F PR MOST RECENT DIASTOLIC BLOOD PRESSURE < 80 MM HG: ICD-10-PCS | Mod: CPTII,S$GLB,, | Performed by: ALLERGY & IMMUNOLOGY

## 2019-05-22 PROCEDURE — 84165 PROTEIN E-PHORESIS SERUM: CPT | Mod: 26,,, | Performed by: PATHOLOGY

## 2019-05-22 PROCEDURE — 3008F PR BODY MASS INDEX (BMI) DOCUMENTED: ICD-10-PCS | Mod: CPTII,S$GLB,, | Performed by: ALLERGY & IMMUNOLOGY

## 2019-05-22 PROCEDURE — 84165 PATHOLOGIST INTERPRETATION SPE: ICD-10-PCS | Mod: 26,,, | Performed by: PATHOLOGY

## 2019-05-22 PROCEDURE — 83520 IMMUNOASSAY QUANT NOS NONAB: CPT

## 2019-05-22 PROCEDURE — 86003 ALLG SPEC IGE CRUDE XTRC EA: CPT | Mod: 59

## 2019-05-22 RX ORDER — FAMOTIDINE 10 MG/1
10 TABLET ORAL 2 TIMES DAILY
COMMUNITY
End: 2020-07-24

## 2019-05-22 RX ORDER — CETIRIZINE HYDROCHLORIDE 10 MG/1
10 TABLET ORAL DAILY
COMMUNITY
End: 2020-07-24

## 2019-05-22 NOTE — LETTER
May 22, 2019      Collin Cha MD  [address] [place] [address]           [facility] Allergy/ Immunology  [address]  [place] [address]  Phone: [phone]  Fax: [phone]          Patient: Renzo Salgado   MR Number: [id number]   YOB: 1970   Date of Visit: 5/22/2019       Dear Dr. Collin Cha:    Thank you for referring Renzo Salgado to me for evaluation. Attached you will find relevant portions of my assessment and plan of care.    If you have questions, please do not hesitate to call me. I look forward to following Renzo Salgado along with you.    Sincerely,    Walker Meredith MD    Enclosure  CC:  No Recipients    If you would like to receive this communication electronically, please contact [email or web address] or [phone] to request more information on [employer] access.    For providers and/or their staff who would like to refer a patient to [facility], please contact us through our one-stop-shop provider referral line, [facility], at [phone].    If you feel you have received this communication in error or would no longer like to receive these types of communications, please e-mail [email or web address]

## 2019-05-22 NOTE — PROGRESS NOTES
Chief complaint:  Allergic reaction    This note was dictated using voice recognition software and may contain errors.    History:    He had 1:00 p.m. appointment on Wednesday May 22, 2019.  Information in his medical record regarding his past medical history, family history, and social history, was reviewed and updated today.  Significant additions, if any are as noted below.    He is a native of the [facility].  He is a .  He is working as a pipe .  He has lived in [place] the past 4 years.  Prior to that he had lived in [place] for 9 years.    He stated 9 years ago after eating crab he developed hives.    Please refer to the node in his medical record of Ena 10, 2018.  He was evaluated in the emergency room because of hives.  At that time it was thought that perhaps the hives had developed subsequent to eating shrimp and he stated also fish.    On Saturday evening May 18, 2019, he ate fish at his home.  The type of fish he ate was milkfish.  This fish was frozen.  He stated after eating the fish on Saturday, later in the evening he became aware of the development of coughing.  About 4:00 a.m. on Robinson May 19 he awakened due to itching of his thighs.  About 7:00 a.m. on Robinson May 19, he experienced swelling of the chin.  Please refer to notes in his medical record dated May 19 and May 20, 2019.  He went to the emergency room on May 19 and again on May 20.  On May 21 he had an appointment with Dr. Damico.  Please refer to his note.    He stated as a young child he did have bronchial asthma.  He stated asthma stopped  being a problem for him when he was in 3rd grade.  During the years that he has been in the [place] he has not experienced asthma.  He has no history of hay fever.  He stated when he mows grass, if the grass contacts, his skin he may experience itching.    Social history:  He has no animals at his residence.    Family history is negative for lupus and thyroid  disease.  He is aware of a cousin who was allergic to shellfish.    Past medical history:  He has no history of nasal polyps.  He does not believe he is allergic to any animals.  He has sleep apnea.  He has atrial fibrillation.  He has a history of hypertension.  He had been receiving treatment with Lisinopril.  The treatment with lisinopril was discontinued yesterday.    Review of systems:  At the time of his appointment this afternoon he is feeling well in general.  He is not itching at this time.  He has been taking Zyrtec and Pepcid.  He has not been taking Prednisone.    Exam:    In general he is in no distress.  He is alert oriented well-developed in good mood and attentive  Gait steady  Skin no rash noted no urticaria noted  Head no swelling noted  Eyes scleral white conjunctiva pink  Nose patent no polyp seen  Mouth no swelling of the lips tongue or in the throat noted  Ears not inflamed tympanic membranes not inflamed  Neck no masses or thyromegaly noted  Lymph nodes no significant cervical or epitrochlear lymphadenopathy noted  Lungs clear to auscultation  Heart no murmurs heard rhythm which is irregular consistent with his history of atrial fibrillation  Extremities no swelling or inflammation of the hands legs noted    Impression:    1.  Urticaria, possibly a manifestation of food allergy  2.  Swelling consistent with angioedema possibly a manifestation of food allergy  3.  Atrial fibrillation  4.  Hypertension  5.  Obstructive sleep apnea  6.  Other health concerns as noted in his medical record    Assessment Plan:    His appointment was 60 min in duration spent entirely in face-to-face contact.  More than 50% of the visit was spent in counseling and coordination of care.    I recommended laboratory tests be performed.  Arrangements were made to have blood drawn after his appointment with me today.      While was here today, I called the [facility] reference laboratory in [place].  I was  informed that it was not possible to order an IgE assay directed against milkfish.    We had a very lengthy discussion regarding causes of urticaria and angioedema.  I told him I am concerned that he may have experienced an allergic reaction due to food allergy to the fish he ate.  In the past he had experienced urticaria after eating crab 9 years ago.  In June of 2018 he experienced urticaria after eating fish and shellfish.    I reviewed with him the treatment of choice for food allergy is avoidance.  I reviewed with him that exposure to food could occur not only as result of ingestion of food, but also due to contact with food and airborne exposure to food protein.    When the results of his laboratory tests are available to review with him I will contact him and do so.  Additional recommendations may be made at that time.    I have recommended that he continue to take H1 and H2 antihistamines as directed.    I discussed with him potential side effects associated with Ace inhibitor therapy, including swelling of the tongue and in the throat.  At any time should he ever experience swelling of the tongue or in the throat, I recommended that he be transported immediately to the nearest emergency facility for additional evaluation and treatment.  I reviewed with him that swelling of the tongue or in the throat could cause airway obstruction and that a fatality could occur as result.    He was given the office phone number.  Should he have additional questions or concerns he was instructed to call.

## 2019-05-23 LAB
ALBUMIN SERPL ELPH-MCNC: 4.27 G/DL (ref 3.35–5.55)
ALPHA1 GLOB SERPL ELPH-MCNC: 0.31 G/DL (ref 0.17–0.41)
ALPHA2 GLOB SERPL ELPH-MCNC: 0.97 G/DL (ref 0.43–0.99)
ANA SER QL IF: NORMAL
B-GLOBULIN SERPL ELPH-MCNC: 1.08 G/DL (ref 0.5–1.1)
GAMMA GLOB SERPL ELPH-MCNC: 1.17 G/DL (ref 0.67–1.58)
PATHOLOGIST INTERPRETATION SPE: NORMAL
PROT SERPL-MCNC: 7.8 G/DL (ref 6–8.4)

## 2019-05-24 ENCOUNTER — TELEPHONE (OUTPATIENT)
Dept: ALLERGY | Facility: CLINIC | Age: 49
End: 2019-05-24

## 2019-05-24 LAB — TRYPTASE LEVEL: 5.7 NG/ML

## 2019-05-24 NOTE — TELEPHONE ENCOUNTER
I finished my telephone conversation with him on Friday May 24, 2019 at 3:41 p.m..  I reviewed with him the results of some of the laboratory tests which I had ordered at the time of his appointment with me this week.  He is not experiencing any more urticaria or swelling.  I told him I will contact him next week when the results of other tests are available to review.    This note was dictated using voice recognition software and may contain errors.

## 2019-05-28 LAB
CRAB IGE QN: <0.35 KU/L
CRAWFISH IGE QN: <0.35 KU/L
DEPRECATED CRAB IGE RAST QL: NORMAL
DEPRECATED CRAWFISH IGE RAST QL: NORMAL
DEPRECATED LOBSTER IGE RAST QL: NORMAL
DEPRECATED SHRIMP IGE RAST QL: NORMAL
LOBSTER IGE QN: <0.35 KU/L
SHRIMP IGE QN: <0.35 KU/L

## 2019-05-29 LAB — CH50 SERPL-ACNC: 95 U/ML (ref 42–95)

## 2019-06-03 ENCOUNTER — PATIENT MESSAGE (OUTPATIENT)
Dept: ALLERGY | Facility: CLINIC | Age: 49
End: 2019-06-03

## 2019-06-04 ENCOUNTER — TELEPHONE (OUTPATIENT)
Dept: ALLERGY | Facility: CLINIC | Age: 49
End: 2019-06-04

## 2019-06-04 NOTE — TELEPHONE ENCOUNTER
I spoke with him on the telephone about 1:00 p.m. on Tuesday June 4, 2019.  We reviewed the results of the laboratory tests which I had ordered after his appointment with me in May.  He stated when he ate crab 9 years ago he was in the [facility].    He stated he has eaten other fish and has tolerated the fish.  He will continue to avoid milkfish.    Should he experience more urticaria and swelling I suggested he take inventory of the time a day, recent activity, recent food or beverage ingested in any medication which she may have taken.  Should he continue to be symptomatic I suggested he call so that we could discuss these symptomatic events.    This note was dictated using voice recognition software and may contain errors.

## 2019-08-01 ENCOUNTER — OFFICE VISIT (OUTPATIENT)
Dept: CARDIOLOGY | Facility: CLINIC | Age: 49
End: 2019-08-01
Payer: COMMERCIAL

## 2019-08-01 VITALS
DIASTOLIC BLOOD PRESSURE: 84 MMHG | HEIGHT: 66 IN | SYSTOLIC BLOOD PRESSURE: 126 MMHG | HEART RATE: 81 BPM | BODY MASS INDEX: 28.61 KG/M2 | WEIGHT: 178 LBS

## 2019-08-01 DIAGNOSIS — I10 ESSENTIAL HYPERTENSION: ICD-10-CM

## 2019-08-01 DIAGNOSIS — G47.33 OSA (OBSTRUCTIVE SLEEP APNEA): ICD-10-CM

## 2019-08-01 DIAGNOSIS — I48.0 PAF (PAROXYSMAL ATRIAL FIBRILLATION): Primary | ICD-10-CM

## 2019-08-01 PROCEDURE — 99999 PR PBB SHADOW E&M-EST. PATIENT-LVL III: CPT | Mod: PBBFAC,,, | Performed by: INTERNAL MEDICINE

## 2019-08-01 PROCEDURE — 3008F PR BODY MASS INDEX (BMI) DOCUMENTED: ICD-10-PCS | Mod: CPTII,S$GLB,, | Performed by: INTERNAL MEDICINE

## 2019-08-01 PROCEDURE — 99214 PR OFFICE/OUTPT VISIT, EST, LEVL IV, 30-39 MIN: ICD-10-PCS | Mod: S$GLB,,, | Performed by: INTERNAL MEDICINE

## 2019-08-01 PROCEDURE — 3079F DIAST BP 80-89 MM HG: CPT | Mod: CPTII,S$GLB,, | Performed by: INTERNAL MEDICINE

## 2019-08-01 PROCEDURE — 3074F SYST BP LT 130 MM HG: CPT | Mod: CPTII,S$GLB,, | Performed by: INTERNAL MEDICINE

## 2019-08-01 PROCEDURE — 99999 PR PBB SHADOW E&M-EST. PATIENT-LVL III: ICD-10-PCS | Mod: PBBFAC,,, | Performed by: INTERNAL MEDICINE

## 2019-08-01 PROCEDURE — 3074F PR MOST RECENT SYSTOLIC BLOOD PRESSURE < 130 MM HG: ICD-10-PCS | Mod: CPTII,S$GLB,, | Performed by: INTERNAL MEDICINE

## 2019-08-01 PROCEDURE — 3079F PR MOST RECENT DIASTOLIC BLOOD PRESSURE 80-89 MM HG: ICD-10-PCS | Mod: CPTII,S$GLB,, | Performed by: INTERNAL MEDICINE

## 2019-08-01 PROCEDURE — 99214 OFFICE O/P EST MOD 30 MIN: CPT | Mod: S$GLB,,, | Performed by: INTERNAL MEDICINE

## 2019-08-01 PROCEDURE — 3008F BODY MASS INDEX DOCD: CPT | Mod: CPTII,S$GLB,, | Performed by: INTERNAL MEDICINE

## 2019-08-01 NOTE — PROGRESS NOTES
Subjective:   Patient ID:  Renzo Salgado is a 48 y.o. male who presents for cardiac consult of Atrial Fibrillation and Hypertension      Atrial Fibrillation   Symptoms are negative for chest pain, palpitations and shortness of breath. Past medical history includes atrial fibrillation.   Hypertension   Pertinent negatives include no chest pain, palpitations or shortness of breath.     The patient came in today for cardiac consult of Atrial Fibrillation and Hypertension    Renzo Salgado is a 48 y.o. male  pt with PAFib on Eliquis, HTN, KIKO, renal stones presents for follow up CV evaluation.     9/10/18  He recently had left ureteroscopy with laser lithotripsy and stone basket extraction and Cystoscopy with placement of left double-J ureteral stent 9/4/18 per Dr. Baker. Prior to surgery, the pt had Afib with RVR. Cardiology was consulted. Pt received IV Cardizem and was placed on a IV Cardizem drip. He underwent surgery and was admitted for uncontrolled Afib on IV Cardizem. Cardizem 120 mg po added to his regimen.     His cardiologist is Dr. Ned Parker with [facility]. Last Friday he saw Dr. Parker. He may get a DCCV. He had Afib since 2011.  He was also on Multaq but has been in Afib. He is here for a second opinion. Pt had stress test in 2011 which was negative.   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     10/18/18  Pt had RICARDO/DCCV last week which was successful to convert to NSR. Unfortunately he is back in Afib, rate controlled at 80 bpm. Walks everyday without issues. He feels well today. Will refer to sleep apnea. Pt does snore, nocturia, fatigue at times. ECG- AFib HR 80s    4/30/19  Pt had sleep study which was abnormal, needs CPAP. He has started CPAP since Jan, breathing improved, less apneic episodes. No bleeding issues with Eliquis. BP mildly elevated, pt rushed getting here and mildly stressed. Discussed will do repeat DCCV.     8/1/19  Pt feels overall well, is walking well. No CP. Does  not feel palpitations. No further hematuria, no bleeding on Eliquis. He had an allergy in July to lisinopril and changed to Diovan.Is compliant with CPAP, doing well. Discussed DCCV if symptoms are worse.      Patient feels no chest pain, no sob, no leg swelling, no PND, no palpitation, no dizziness, no syncope, no CNS symptoms.     Patient has fairly good exercise tolerance.    Patient is compliant with medications.    2D ECHO  4/2016  CONCLUSIONS     1 - Concentric remodeling.     2 - Normal left ventricular systolic function (EF 55-60%).     3 - Normal left ventricular diastolic function.     4 - Normal right ventricular systolic function .     5 - The estimated PA systolic pressure is 31 mmHg.     6 - Mild tricuspid regurgitation.       RICARDO   CONCLUSIONS     1 - No visualized thrombus in the left atrium with spontaneous echo contrast.     2 - No visualized thrombus in the left atrial appendage.     3 - Normal left ventricular systolic function (EF 55-60%).     4 - Indeterminate LV diastolic function.     5 - Trivial to mild mitral regurgitation.     6 - Trivial tricuspid regurgitation.     This document has been electronically    SIGNED BY: Abundio Alvarado MD On: 10/05/2018 11:02            Past Medical History:   Diagnosis Date    Atrial fibrillation     Hypertension             Past Surgical History:   Procedure Laterality Date    CARDIOVERSION N/A 10/5/2018    Per[facility] at [facility] CATH LAB    CYSTOSCOPY, WITH URETERAL STENT INSERTION Left 9/4/2018    Performed by Ricky Baker IV, MD at [facility] OR    ECHOCARDIOGRAM,TRANSESOPHAGEAL N/A 10/5/2018    Performed by [facility]ado MD at [facility] CATH LAB    EXTRACTION - STONE Left 9/4/2018    Performed by Ricky Baker IV, MD at [facility] OR    LITHOTRIPSY, USING LASER Left 9/4/2018    Performed by Ricky [facility] IV, [facility] at [facility] OR    UPPER GASTROINTESTINAL ENDOSCOPY         Social History     Tobacco Use    Smoking status: Never Smoker     Smokeless tobacco: Never Used   Substance Use Topics    Alcohol use: No    Drug use: No       Family History   Problem Relation Age of Onset    Hypertension Mother     Melanoma Neg Hx     Psoriasis Neg Hx     Lupus Neg Hx        Patient's Medications   New Prescriptions    No medications on file   Previous Medications    APIXABAN (ELIQUIS) 5 MG TAB    Take 1 tablet (5 mg total) by mouth 2 (two) times daily.    CETIRIZINE (ZYRTEC) 10 MG TABLET    Take 10 mg by mouth once daily.    DILTIAZEM (CARDIZEM CD) 120 MG CP24    Take 1 capsule (120 mg total) by mouth once daily.    DIPHENHYDRAMINE HCL (BENADRYL ORAL)    Take by mouth as needed.    EPINEPHRINE (EPIPEN) 0.3 MG/0.3 ML ATIN    Inject 0.3 mLs (0.3 mg total) into the muscle as needed.    FAMOTIDINE (PEPCID) 10 MG TABLET    Take 10 mg by mouth 2 (two) times daily.    METOPROLOL SUCCINATE (TOPROL-XL) 100 MG 24 HR TABLET    Take 1 tablet (100 mg total) by mouth 2 (two) times daily.    VALSARTAN (DIOVAN) 80 MG TABLET    Take 1 tablet (80 mg total) by mouth once daily.   Modified Medications    No medications on file   Discontinued Medications    No medications on file       Review of Systems   Constitutional: Negative.    HENT: Negative.    Eyes: Negative.    Respiratory: Negative.  Negative for shortness of breath.    Cardiovascular: Negative.  Negative for chest pain and palpitations.   Gastrointestinal: Negative.    Genitourinary: Negative for hematuria.   Musculoskeletal: Negative.    Skin: Negative.    Neurological: Negative.    Endo/Heme/Allergies: Negative.    Psychiatric/Behavioral: Negative.    All 12 systems otherwise negative.      Wt Readings from Last 3 Encounters:   08/01/19 80.7 kg (178 lb)   05/22/19 83.6 kg (184 lb 4.9 oz)   05/21/19 84.5 kg (186 lb 4.6 oz)     Temp Readings from Last 3 Encounters:   05/22/19 97.9 °F (36.6 °C)   05/21/19 97.5 °F (36.4 °C) (Tympanic)   05/20/19 98.2 °F (36.8 °C) (Oral)     BP Readings from Last 3 Encounters:  "  08/01/19 126/84   05/22/19 110/70   05/21/19 124/87     Pulse Readings from Last 3 Encounters:   08/01/19 81   05/22/19 78   05/21/19 95       /84   Pulse 81   Ht 5' 6" (1.676 m)   Wt 80.7 kg (178 lb)   BMI 28.73 kg/m²     Objective:   Physical Exam   Constitutional: He is oriented to person, place, and time. He appears well-developed and well-nourished. No distress.   HENT:   Head: Normocephalic and atraumatic.   Nose: Nose normal.   Mouth/Throat: Oropharynx is clear and moist.   Eyes: Conjunctivae and EOM are normal. No scleral icterus.   Neck: Normal range of motion. Neck supple. No JVD present. No thyromegaly present.   Cardiovascular: Normal rate, S1 normal and S2 normal. An irregularly irregular rhythm present. Exam reveals no gallop, no S3, no S4 and no friction rub.   No murmur heard.  Pulmonary/Chest: Effort normal and breath sounds normal. No stridor. No respiratory distress. He has no wheezes. He has no rales. He exhibits no tenderness.   Abdominal: Soft. Bowel sounds are normal. He exhibits no distension and no mass. There is no tenderness. There is no rebound.   Genitourinary:   Genitourinary Comments: Deferred   Musculoskeletal: Normal range of motion. He exhibits no edema, tenderness or deformity.   Lymphadenopathy:     He has no cervical adenopathy.   Neurological: He is alert and oriented to person, place, and time. He exhibits normal muscle tone. Coordination normal.   Skin: Skin is warm and dry. No rash noted. He is not diaphoretic. No erythema. No pallor.   Psychiatric: He has a normal mood and affect. His behavior is normal. Judgment and thought content normal.   Nursing note and vitals reviewed.      Lab Results   Component Value Date     09/05/2018    K 4.0 09/05/2018     09/05/2018    CO2 22 (L) 09/05/2018    BUN 14 09/05/2018    CREATININE 1.0 09/05/2018     (H) 09/05/2018    AST 14 09/05/2018    ALT 20 09/05/2018    ALBUMIN 3.7 09/05/2018    PROT 6.9 " 09/05/2018    BILITOT 1.0 09/05/2018    WBC 7.07 09/05/2018    HGB 15.3 09/05/2018    HCT 43.6 09/05/2018    MCV 90 09/05/2018     09/05/2018    TSH 1.807 05/22/2019    CHOL 153 01/18/2016    HDL 39 (L) 01/18/2016    LDLCALC 78.0 01/18/2016    TRIG 180 (H) 01/18/2016    BNP 85 04/01/2016     Assessment:      1. PAF (paroxysmal atrial fibrillation)    2. Essential hypertension    3. KIKO (obstructive sleep apnea)        Plan:   1.PAFib s/p RICARDO/DCCV 10/2018 - in Afib rate controlled   - stress negative in past  - cont BB and CCB   - cont Eliquis for AC  - will rate control now, if symptoms occur will refer to EP for ablation vs antiarrythmic vs repeat DCCV    2. HTN  - cont meds    3. Sleep apnea   - cont CPAP     Thank you for allowing me to participate in this patient's care. Please do not hesitate to contact me with any questions or concerns. Consult note has been forwarded to the referral physician.

## 2019-08-13 ENCOUNTER — PATIENT MESSAGE (OUTPATIENT)
Dept: CARDIOLOGY | Facility: CLINIC | Age: 49
End: 2019-08-13

## 2019-08-14 DIAGNOSIS — I48.0 PAROXYSMAL ATRIAL FIBRILLATION: Primary | ICD-10-CM

## 2019-08-14 RX ORDER — METOPROLOL SUCCINATE 100 MG/1
100 TABLET, EXTENDED RELEASE ORAL 2 TIMES DAILY
Qty: 90 TABLET | Refills: 3 | Status: SHIPPED | OUTPATIENT
Start: 2019-08-14 | End: 2020-01-30 | Stop reason: SDUPTHER

## 2019-08-14 NOTE — TELEPHONE ENCOUNTER
----- Message from Karla Urbina sent at 8/14/2019 11:51 AM CDT -----  Contact: Patient   Type:  RX Refill Request    Who Called: Renzo Souza  Refill or New Rx:refill  RX Name and Strength:metoprolol succinate (TOPROL-XL) 100 MG 24 hr tablet  How is the patient currently taking it? (ex. 1XDay):2xday  Is this a 30 day or 90 day RX:n/a  Preferred Pharmacy with phone number:  [id number] - [place], [place] - [address] AT [facility]  [address] [address]  Phone: [phone] Fax: [phone]      Local or Mail Order:local  Ordering Provider:Dr. Alvarado  Would the patient rather a call back or a response via [facility]? call  Best Call Back Number:[phone]  Additional Information: his pharmacy faxed request for refill last Friday--pt is running out of medication

## 2019-08-14 NOTE — TELEPHONE ENCOUNTER
Returned call to patient and informed that he may check with pharmacy later today to see if refill for Toprol is ready for --patient verbalizes understanding

## 2019-09-12 DIAGNOSIS — I48.0 PAF (PAROXYSMAL ATRIAL FIBRILLATION): Primary | ICD-10-CM

## 2019-09-12 RX ORDER — DILTIAZEM HYDROCHLORIDE 120 MG/1
120 CAPSULE, COATED, EXTENDED RELEASE ORAL DAILY
Qty: 90 CAPSULE | Refills: 3 | Status: SHIPPED | OUTPATIENT
Start: 2019-09-12 | End: 2020-08-26 | Stop reason: SDUPTHER

## 2019-09-23 ENCOUNTER — PATIENT MESSAGE (OUTPATIENT)
Dept: CARDIOLOGY | Facility: CLINIC | Age: 49
End: 2019-09-23

## 2019-09-24 ENCOUNTER — PATIENT MESSAGE (OUTPATIENT)
Dept: INTERNAL MEDICINE | Facility: CLINIC | Age: 49
End: 2019-09-24

## 2020-01-06 ENCOUNTER — PATIENT MESSAGE (OUTPATIENT)
Dept: CARDIOLOGY | Facility: CLINIC | Age: 50
End: 2020-01-06

## 2020-01-06 DIAGNOSIS — I48.0 PAF (PAROXYSMAL ATRIAL FIBRILLATION): Primary | ICD-10-CM

## 2020-01-30 DIAGNOSIS — I48.0 PAROXYSMAL ATRIAL FIBRILLATION: ICD-10-CM

## 2020-01-30 RX ORDER — METOPROLOL SUCCINATE 100 MG/1
100 TABLET, EXTENDED RELEASE ORAL 2 TIMES DAILY
Qty: 90 TABLET | Refills: 1 | Status: SHIPPED | OUTPATIENT
Start: 2020-01-30 | End: 2020-04-29 | Stop reason: SDUPTHER

## 2020-03-09 ENCOUNTER — IMMUNIZATION (OUTPATIENT)
Dept: PHARMACY | Facility: CLINIC | Age: 50
End: 2020-03-09
Payer: COMMERCIAL

## 2020-04-23 DIAGNOSIS — I48.0 PAF (PAROXYSMAL ATRIAL FIBRILLATION): ICD-10-CM

## 2020-04-24 DIAGNOSIS — I10 ESSENTIAL HYPERTENSION: ICD-10-CM

## 2020-04-24 RX ORDER — VALSARTAN 80 MG/1
80 TABLET ORAL DAILY
Qty: 90 TABLET | Refills: 0 | Status: SHIPPED | OUTPATIENT
Start: 2020-04-24 | End: 2020-07-22

## 2020-04-29 DIAGNOSIS — I48.0 PAROXYSMAL ATRIAL FIBRILLATION: ICD-10-CM

## 2020-04-29 RX ORDER — METOPROLOL SUCCINATE 100 MG/1
100 TABLET, EXTENDED RELEASE ORAL 2 TIMES DAILY
Qty: 90 TABLET | Refills: 1 | Status: SHIPPED | OUTPATIENT
Start: 2020-04-29 | End: 2020-07-23 | Stop reason: SDUPTHER

## 2020-05-01 DIAGNOSIS — I48.0 PAF (PAROXYSMAL ATRIAL FIBRILLATION): ICD-10-CM

## 2020-05-03 ENCOUNTER — PATIENT MESSAGE (OUTPATIENT)
Dept: INTERNAL MEDICINE | Facility: CLINIC | Age: 50
End: 2020-05-03

## 2020-05-03 DIAGNOSIS — I10 ESSENTIAL HYPERTENSION: ICD-10-CM

## 2020-05-03 RX ORDER — VALSARTAN 80 MG/1
80 TABLET ORAL DAILY
Qty: 90 TABLET | Refills: 0 | Status: CANCELLED | OUTPATIENT
Start: 2020-05-03 | End: 2021-05-03

## 2020-05-07 DIAGNOSIS — I48.0 PAF (PAROXYSMAL ATRIAL FIBRILLATION): ICD-10-CM

## 2020-06-25 ENCOUNTER — HOSPITAL ENCOUNTER (EMERGENCY)
Facility: HOSPITAL | Age: 50
Discharge: HOME | End: 2020-06-25
Attending: EMERGENCY MEDICINE
Payer: COMMERCIAL

## 2020-06-25 VITALS
OXYGEN SATURATION: 100 % | DIASTOLIC BLOOD PRESSURE: 89 MMHG | HEIGHT: 67 IN | WEIGHT: 195.31 LBS | SYSTOLIC BLOOD PRESSURE: 137 MMHG | TEMPERATURE: 98 F | RESPIRATION RATE: 18 BRPM | BODY MASS INDEX: 30.65 KG/M2 | HEART RATE: 85 BPM

## 2020-06-25 DIAGNOSIS — K21.9 GASTROESOPHAGEAL REFLUX DISEASE, ESOPHAGITIS PRESENCE NOT SPECIFIED: ICD-10-CM

## 2020-06-25 DIAGNOSIS — R14.0 ABDOMINAL BLOATING: Primary | ICD-10-CM

## 2020-06-25 DIAGNOSIS — Z20.822 COVID-19 VIRUS NOT DETECTED: ICD-10-CM

## 2020-06-25 LAB — SARS-COV-2 RDRP RESP QL NAA+PROBE: NEGATIVE

## 2020-06-25 PROCEDURE — U0002 COVID-19 LAB TEST NON-CDC: HCPCS

## 2020-06-25 PROCEDURE — 25000003 PHARM REV CODE 250: Performed by: REGISTERED NURSE

## 2020-06-25 PROCEDURE — 99283 EMERGENCY DEPT VISIT LOW MDM: CPT

## 2020-06-25 RX ORDER — OMEPRAZOLE 20 MG/1
20 CAPSULE, DELAYED RELEASE ORAL DAILY
Qty: 30 CAPSULE | Refills: 0 | Status: SHIPPED | OUTPATIENT
Start: 2020-06-25 | End: 2020-07-24

## 2020-06-25 RX ORDER — MAG HYDROX/ALUMINUM HYD/SIMETH 200-200-20
30 SUSPENSION, ORAL (FINAL DOSE FORM) ORAL
Status: COMPLETED | OUTPATIENT
Start: 2020-06-25 | End: 2020-06-25

## 2020-06-25 RX ADMIN — ALUMINUM HYDROXIDE, MAGNESIUM HYDROXIDE, AND SIMETHICONE 30 ML: 200; 200; 20 SUSPENSION ORAL at 08:06

## 2020-06-26 NOTE — ED PROVIDER NOTES
"Encounter Date: 6/25/2020       History     Chief Complaint   Patient presents with    Headache     pt reports headache since this morning. Pt reports abd pain and gas. Pt states " I would like to be tested for covid".      The history is provided by the patient.   Pt reports waking up today feeling bloated and weak (which has since resolved). He reports to ED requesting covid screen, pt has hx of acid reflux and has been off Prilosec for last 3 years using home remedy of alkaline water. He states that symptoms feel similar to his episodes in the past. He denies any weakness fever, cough, NV, CP or SOB at this time    Review of patient's allergies indicates:   Allergen Reactions    Shellfish containing products Hives     Past Medical History:   Diagnosis Date    Atrial fibrillation     Hypertension     Sleep apnea      Past Surgical History:   Procedure Laterality Date    CARDIOVERSION N/A 10/5/2018    Procedure: CARDIOVERSION;  Surgeon: Abundio Alvarado MD;  Location: [facility] CATH LAB;  Service: Cardiology;  Laterality: N/A;  Anesthesia notified 9/24 gcd    CYSTOSCOPY W/ URETERAL STENT PLACEMENT Left 9/4/2018    Procedure: CYSTOSCOPY, WITH URETERAL STENT INSERTION;  Surgeon: Ricky Baker IV, MD;  Location: [facility] OR;  Service: Urology;  Laterality: Left;    LASER LITHOTRIPSY Left 9/4/2018    Procedure: LITHOTRIPSY, USING LASER;  Surgeon: Ricky Baker IV, MD;  Location: [facility] OR;  Service: Urology;  Laterality: Left;    UPPER GASTROINTESTINAL ENDOSCOPY       Family History   Problem Relation Age of Onset    Hypertension Mother     Melanoma Neg Hx     Psoriasis Neg Hx     Lupus Neg Hx      Social History     Tobacco Use    Smoking status: Never Smoker    Smokeless tobacco: Never Used   Substance Use Topics    Alcohol use: No    Drug use: No     Review of Systems   Constitutional: Negative for fever.   HENT: Negative for sore throat.    Respiratory: Negative for shortness of breath.    Cardiovascular: " Negative for chest pain.   Gastrointestinal: Negative for nausea.        + Abdominal bloating    Genitourinary: Negative for dysuria.   Musculoskeletal: Negative for back pain.   Skin: Negative for rash.   Neurological: Positive for weakness and headaches.   Hematological: Does not bruise/bleed easily.   All other systems reviewed and are negative.      Physical Exam     Initial Vitals   BP Pulse Resp Temp SpO2   06/25/20 1731 06/25/20 1731 06/25/20 1731 06/25/20 1731 06/25/20 1732   (!) 144/78 78 18 98.3 °F (36.8 °C) 98 %      MAP       --                Physical Exam    Constitutional: He appears well-developed and well-nourished. No distress.   HENT:   Head: Normocephalic and atraumatic.   Nose: Nose normal.   Mouth/Throat: Uvula is midline and oropharynx is clear and moist.   Eyes: Conjunctivae and EOM are normal. Pupils are equal, round, and reactive to light.   Neck: Normal range of motion. Neck supple.   Cardiovascular: Normal rate and regular rhythm.   Pulmonary/Chest: Effort normal and breath sounds normal. No respiratory distress. He has no decreased breath sounds. He has no wheezes. He has no rales.   Abdominal: Soft. Normal appearance and bowel sounds are normal. There is no abdominal tenderness.   Musculoskeletal: Normal range of motion.   Neurological: He is alert and oriented to person, place, and time. He has normal strength. GCS eye subscore is 4. GCS verbal subscore is 5. GCS motor subscore is 6.   Skin: Skin is warm and dry. Capillary refill takes less than 2 seconds. No rash noted.   Psychiatric: He has a normal mood and affect. His speech is normal and behavior is normal.         ED Course   Procedures  Labs Reviewed   SARS-COV-2 RNA AMPLIFICATION, QUAL          Imaging Results    None           7:10 PM: Pt declined any imaging of abdomen, requesting medication for acid reflux here and another Rx for prilosec    I discussed with patient and/or family/caretaker that evaluation in the ED does not  suggest any emergent or life threatening medical conditions requiring immediate intervention beyond what was provided in the ED, and I believe patient is safe for discharge.  Regardless, an unremarkable evaluation in the ED does not preclude the development or presence of a serious of life threatening condition. As such, patient was instructed to return immediately for any worsening or change in current symptoms.                                 Clinical Impression:       ICD-10-CM ICD-9-CM   1. Abdominal bloating  R14.0 787.3   2. Gastroesophageal reflux disease, esophagitis presence not specified  K21.9 530.81   3. Covid-19 Virus not Detected  [id number]                                 Jac Watkins Jr., [facility]  06/25/20 1913

## 2020-06-26 NOTE — ED NOTES
Patient identifiers verified and correct for Renzo Salgado.    LOC: The patient is awake, alert and aware of environment with an appropriate affect, the patient is oriented x 3 and speaking appropriately. Pt reports a headache since this a.m. and requested a COVID test upon triage.   APPEARANCE: Patient resting comfortably and in no acute distress, patient is clean and well groomed, patient's clothing is properly fastened.  SKIN: The skin is warm and dry, color consistent with ethnicity, patient has normal skin turgor and moist mucus membranes, skin intact, no breakdown or bruising noted.  MUSCULOSKELETAL: Patient moving all extremities spontaneously.  RESPIRATORY: Airway is open and patent, respirations are spontaneous.  CARDIAC: Patient has a normal rate, no periphreal edema noted, capillary refill < 3 seconds.  ABDOMEN: Soft and non tender to palpation. Pt reports generalized abd pain with gas.

## 2020-07-22 DIAGNOSIS — I10 ESSENTIAL HYPERTENSION: ICD-10-CM

## 2020-07-22 RX ORDER — VALSARTAN 80 MG/1
TABLET ORAL
Qty: 30 TABLET | Refills: 0 | Status: SHIPPED | OUTPATIENT
Start: 2020-07-22 | End: 2020-08-26

## 2020-07-22 NOTE — TELEPHONE ENCOUNTER
Informed pt that his medication was renewed but he is due for appointment. He verbalized understanding

## 2020-07-23 DIAGNOSIS — I48.0 PAROXYSMAL ATRIAL FIBRILLATION: ICD-10-CM

## 2020-07-23 RX ORDER — METOPROLOL SUCCINATE 100 MG/1
100 TABLET, EXTENDED RELEASE ORAL 2 TIMES DAILY
Qty: 60 TABLET | Refills: 2 | Status: SHIPPED | OUTPATIENT
Start: 2020-07-23 | End: 2020-10-20 | Stop reason: SDUPTHER

## 2020-07-24 ENCOUNTER — OFFICE VISIT (OUTPATIENT)
Dept: INTERNAL MEDICINE | Facility: CLINIC | Age: 50
End: 2020-07-24
Payer: COMMERCIAL

## 2020-07-24 VITALS
WEIGHT: 189.38 LBS | OXYGEN SATURATION: 97 % | BODY MASS INDEX: 29.66 KG/M2 | TEMPERATURE: 98 F | HEART RATE: 73 BPM | DIASTOLIC BLOOD PRESSURE: 80 MMHG | SYSTOLIC BLOOD PRESSURE: 112 MMHG

## 2020-07-24 DIAGNOSIS — I10 ESSENTIAL HYPERTENSION: ICD-10-CM

## 2020-07-24 DIAGNOSIS — Z23 NEED FOR DIPHTHERIA-TETANUS-PERTUSSIS (TDAP) VACCINE: ICD-10-CM

## 2020-07-24 DIAGNOSIS — Z00.00 ROUTINE GENERAL MEDICAL EXAMINATION AT A HEALTH CARE FACILITY: Primary | ICD-10-CM

## 2020-07-24 DIAGNOSIS — R68.82 DECREASED LIBIDO: ICD-10-CM

## 2020-07-24 DIAGNOSIS — I48.0 PAF (PAROXYSMAL ATRIAL FIBRILLATION): ICD-10-CM

## 2020-07-24 PROCEDURE — 3079F DIAST BP 80-89 MM HG: CPT | Mod: CPTII,S$GLB,, | Performed by: INTERNAL MEDICINE

## 2020-07-24 PROCEDURE — 99999 PR PBB SHADOW E&M-EST. PATIENT-LVL IV: ICD-10-PCS | Mod: PBBFAC,,, | Performed by: INTERNAL MEDICINE

## 2020-07-24 PROCEDURE — 3079F PR MOST RECENT DIASTOLIC BLOOD PRESSURE 80-89 MM HG: ICD-10-PCS | Mod: CPTII,S$GLB,, | Performed by: INTERNAL MEDICINE

## 2020-07-24 PROCEDURE — 3008F PR BODY MASS INDEX (BMI) DOCUMENTED: ICD-10-PCS | Mod: CPTII,S$GLB,, | Performed by: INTERNAL MEDICINE

## 2020-07-24 PROCEDURE — 99396 PREV VISIT EST AGE 40-64: CPT | Mod: 25,S$GLB,, | Performed by: INTERNAL MEDICINE

## 2020-07-24 PROCEDURE — 3008F BODY MASS INDEX DOCD: CPT | Mod: CPTII,S$GLB,, | Performed by: INTERNAL MEDICINE

## 2020-07-24 PROCEDURE — 99999 PR PBB SHADOW E&M-EST. PATIENT-LVL IV: CPT | Mod: PBBFAC,,, | Performed by: INTERNAL MEDICINE

## 2020-07-24 PROCEDURE — 90715 TDAP VACCINE GREATER THAN OR EQUAL TO 7YO IM: ICD-10-PCS | Mod: S$GLB,,, | Performed by: INTERNAL MEDICINE

## 2020-07-24 PROCEDURE — 90471 IMMUNIZATION ADMIN: CPT | Mod: S$GLB,,, | Performed by: INTERNAL MEDICINE

## 2020-07-24 PROCEDURE — 3074F SYST BP LT 130 MM HG: CPT | Mod: CPTII,S$GLB,, | Performed by: INTERNAL MEDICINE

## 2020-07-24 PROCEDURE — 99396 PR PREVENTIVE VISIT,EST,40-64: ICD-10-PCS | Mod: 25,S$GLB,, | Performed by: INTERNAL MEDICINE

## 2020-07-24 PROCEDURE — 3074F PR MOST RECENT SYSTOLIC BLOOD PRESSURE < 130 MM HG: ICD-10-PCS | Mod: CPTII,S$GLB,, | Performed by: INTERNAL MEDICINE

## 2020-07-24 PROCEDURE — 90715 TDAP VACCINE 7 YRS/> IM: CPT | Mod: S$GLB,,, | Performed by: INTERNAL MEDICINE

## 2020-07-24 PROCEDURE — 90471 TDAP VACCINE GREATER THAN OR EQUAL TO 7YO IM: ICD-10-PCS | Mod: S$GLB,,, | Performed by: INTERNAL MEDICINE

## 2020-07-24 NOTE — PROGRESS NOTES
Subjective:      Patient ID: Renzo Salgado is a 49 y.o. male.    Chief Complaint: Annual Exam    HPI   50 yo with   Patient Active Problem List   Diagnosis    Essential hypertension    Renal stone    Ureteral stone    KIKO (obstructive sleep apnea)    PAF (paroxysmal atrial fibrillation)    Lip swelling     Past Medical History:   Diagnosis Date    Atrial fibrillation     Hypertension     Sleep apnea        Here today for annual prev exam.  Compliant with meds without significant side effects. Energy and appetite are good.     Decreased libido for several month.     Past Surgical History:   Procedure Laterality Date    CARDIOVERSION N/A 10/5/2018    Procedure: CARDIOVERSION;  Surgeon: Abundio Alvarado MD;  Location: [facility] CATH LAB;  Service: Cardiology;  Laterality: N/A;  Anesthesia notified 9/24 gcd    CYSTOSCOPY W/ URETERAL STENT PLACEMENT Left 9/4/2018    Procedure: CYSTOSCOPY, WITH URETERAL STENT INSERTION;  Surgeon: Ricky Baker IV, MD;  Location: [facility] OR;  Service: Urology;  Laterality: Left;    LASER LITHOTRIPSY Left 9/4/2018    Procedure: LITHOTRIPSY, USING LASER;  Surgeon: Ricky Baker IV, MD;  Location: [facility] OR;  Service: Urology;  Laterality: Left;    UPPER GASTROINTESTINAL ENDOSCOPY       Social History     Socioeconomic History    Marital status:      Spouse name: Not on file    Number of children: Not on file    Years of education: Not on file    Highest education level: Not on file   Occupational History    Not on file   Social Needs    Financial resource strain: Not on file    Food insecurity     Worry: Not on file     Inability: Not on file    Transportation needs     Medical: Not on file     Non-medical: Not on file   Tobacco Use    Smoking status: Never Smoker    Smokeless tobacco: Never Used   Substance and Sexual Activity    Alcohol use: No    Drug use: No    Sexual activity: Not on file   Lifestyle    Physical activity     Days per week: Not on file      Minutes per session: Not on file    Stress: Not on file   Relationships    Social connections     Talks on phone: Not on file     Gets together: Not on file     Attends Latter day service: Not on file     Active member of club or organization: Not on file     Attends meetings of clubs or organizations: Not on file     Relationship status: Not on file   Other Topics Concern    Not on file   Social History Narrative    Not on file       Review of Systems   Constitutional: Negative for chills and fever.   HENT: Negative for ear pain and sore throat.    Respiratory: Negative for cough.    Cardiovascular: Negative for chest pain.   Gastrointestinal: Negative for abdominal pain and blood in stool.   Genitourinary: Negative for dysuria and hematuria.   Neurological: Negative for seizures and syncope.     Objective:   /80 (BP Location: Left arm, Patient Position: Sitting, BP Method: Large (Manual))   Pulse 73   Temp 97.7 °F (36.5 °C) (Tympanic)   Wt 85.9 kg (189 lb 6 oz)   SpO2 97%   BMI 29.66 kg/m²     Physical Exam  Constitutional:       General: He is not in acute distress.     Appearance: He is well-developed.   HENT:      Head: Normocephalic and atraumatic.   Eyes:      Pupils: Pupils are equal, round, and reactive to light.   Neck:      Musculoskeletal: Neck supple.      Thyroid: No thyromegaly.      Vascular: No carotid bruit.   Cardiovascular:      Rate and Rhythm: Normal rate and regular rhythm.   Pulmonary:      Breath sounds: Normal breath sounds. No wheezing or rales.   Abdominal:      General: Bowel sounds are normal.      Palpations: Abdomen is soft.      Tenderness: There is no abdominal tenderness.   Lymphadenopathy:      Cervical: No cervical adenopathy.   Skin:     General: Skin is warm and dry.   Neurological:      Mental Status: He is alert and oriented to person, place, and time.   Psychiatric:         Behavior: Behavior normal.         Assessment:     1. Routine general medical  examination at a health care facility    2. Essential hypertension    3. PAF (paroxysmal atrial fibrillation)    4. Need for diphtheria-tetanus-pertussis (Tdap) vaccine      Plan:   Routine general medical examination at a health care facility  Heart healthy diet and reg exercise  HM reviewed  -     Comprehensive metabolic panel; Future; Expected date: 07/24/2020  -     CBC auto differential; Future; Expected date: 07/24/2020  -     TSH; Future; Expected date: 07/24/2020  -     HIV 1/2 Ag/Ab (4th Gen); Future; Expected date: 07/24/2020  -     Hepatitis C Antibody; Future; Expected date: 07/24/2020  -     Fecal Immunochemical Test (iFOBT); Future; Expected date: 07/24/2020    Essential hypertension    PAF (paroxysmal atrial fibrillation)    Need for diphtheria-tetanus-pertussis (Tdap) vaccine  -     (In Office Administered) Tdap Vaccine        Lab Frequency Next Occurrence   X-Ray Abdomen AP 1 View Once 10/05/2020   US Retroperitoneal Complete (Kidney and Once 10/05/2020       Problem List Items Addressed This Visit        Cardiac/Vascular    Essential hypertension    PAF (paroxysmal atrial fibrillation)      Other Visit Diagnoses     Routine general medical examination at a health care facility    -  Primary    Relevant Orders    Comprehensive metabolic panel    CBC auto differential    TSH    HIV 1/2 Ag/Ab (4th Gen)    Hepatitis C Antibody    Fecal Immunochemical Test (iFOBT)    Need for diphtheria-tetanus-pertussis (Tdap) vaccine        Relevant Orders    (In Office Administered) Tdap Vaccine          Follow up in about 1 year (around 7/24/2021), or if symptoms worsen or fail to improve.

## 2020-08-21 ENCOUNTER — LAB VISIT (OUTPATIENT)
Dept: LAB | Facility: HOSPITAL | Age: 50
End: 2020-08-21
Attending: INTERNAL MEDICINE
Payer: COMMERCIAL

## 2020-08-21 DIAGNOSIS — R68.82 DECREASED LIBIDO: ICD-10-CM

## 2020-08-21 DIAGNOSIS — Z00.00 ROUTINE GENERAL MEDICAL EXAMINATION AT A HEALTH CARE FACILITY: ICD-10-CM

## 2020-08-21 LAB
ALBUMIN SERPL BCP-MCNC: 4.1 G/DL (ref 3.5–5.2)
ALP SERPL-CCNC: 86 U/L (ref 55–135)
ALT SERPL W/O P-5'-P-CCNC: 33 U/L (ref 10–44)
ANION GAP SERPL CALC-SCNC: 10 MMOL/L (ref 8–16)
AST SERPL-CCNC: 20 U/L (ref 10–40)
BASOPHILS # BLD AUTO: 0.05 K/UL (ref 0–0.2)
BASOPHILS NFR BLD: 0.7 % (ref 0–1.9)
BILIRUB SERPL-MCNC: 0.5 MG/DL (ref 0.1–1)
BUN SERPL-MCNC: 11 MG/DL (ref 6–20)
CALCIUM SERPL-MCNC: 9.8 MG/DL (ref 8.7–10.5)
CHLORIDE SERPL-SCNC: 103 MMOL/L (ref 95–110)
CO2 SERPL-SCNC: 27 MMOL/L (ref 23–29)
CREAT SERPL-MCNC: 1 MG/DL (ref 0.5–1.4)
DIFFERENTIAL METHOD: NORMAL
EOSINOPHIL # BLD AUTO: 0.2 K/UL (ref 0–0.5)
EOSINOPHIL NFR BLD: 2.8 % (ref 0–8)
ERYTHROCYTE [DISTWIDTH] IN BLOOD BY AUTOMATED COUNT: 13.9 % (ref 11.5–14.5)
EST. GFR  (AFRICAN AMERICAN): >60 ML/MIN/1.73 M^2
EST. GFR  (NON AFRICAN AMERICAN): >60 ML/MIN/1.73 M^2
GLUCOSE SERPL-MCNC: 105 MG/DL (ref 70–110)
HCT VFR BLD AUTO: 48.7 % (ref 40–54)
HGB BLD-MCNC: 15.9 G/DL (ref 14–18)
IMM GRANULOCYTES # BLD AUTO: 0.02 K/UL (ref 0–0.04)
IMM GRANULOCYTES NFR BLD AUTO: 0.3 % (ref 0–0.5)
LYMPHOCYTES # BLD AUTO: 2.7 K/UL (ref 1–4.8)
LYMPHOCYTES NFR BLD: 38.3 % (ref 18–48)
MCH RBC QN AUTO: 30.3 PG (ref 27–31)
MCHC RBC AUTO-ENTMCNC: 32.6 G/DL (ref 32–36)
MCV RBC AUTO: 93 FL (ref 82–98)
MONOCYTES # BLD AUTO: 0.5 K/UL (ref 0.3–1)
MONOCYTES NFR BLD: 7.6 % (ref 4–15)
NEUTROPHILS # BLD AUTO: 3.6 K/UL (ref 1.8–7.7)
NEUTROPHILS NFR BLD: 50.3 % (ref 38–73)
NRBC BLD-RTO: 0 /100 WBC
PLATELET # BLD AUTO: 209 K/UL (ref 150–350)
PMV BLD AUTO: 10.9 FL (ref 9.2–12.9)
POTASSIUM SERPL-SCNC: 4.6 MMOL/L (ref 3.5–5.1)
PROT SERPL-MCNC: 7.8 G/DL (ref 6–8.4)
RBC # BLD AUTO: 5.25 M/UL (ref 4.6–6.2)
SODIUM SERPL-SCNC: 140 MMOL/L (ref 136–145)
TESTOST SERPL-MCNC: 241 NG/DL (ref 304–1227)
TSH SERPL DL<=0.005 MIU/L-ACNC: 1.85 UIU/ML (ref 0.4–4)
WBC # BLD AUTO: 7.1 K/UL (ref 3.9–12.7)

## 2020-08-21 PROCEDURE — 36415 COLL VENOUS BLD VENIPUNCTURE: CPT

## 2020-08-21 PROCEDURE — 80053 COMPREHEN METABOLIC PANEL: CPT

## 2020-08-21 PROCEDURE — 84443 ASSAY THYROID STIM HORMONE: CPT

## 2020-08-21 PROCEDURE — 85025 COMPLETE CBC W/AUTO DIFF WBC: CPT

## 2020-08-21 PROCEDURE — 86803 HEPATITIS C AB TEST: CPT

## 2020-08-21 PROCEDURE — 86703 HIV-1/HIV-2 1 RESULT ANTBDY: CPT

## 2020-08-21 PROCEDURE — 84403 ASSAY OF TOTAL TESTOSTERONE: CPT

## 2020-08-24 LAB
HCV AB SERPL QL IA: NEGATIVE
HIV 1+2 AB+HIV1 P24 AG SERPL QL IA: NEGATIVE

## 2020-08-26 DIAGNOSIS — I10 ESSENTIAL HYPERTENSION: ICD-10-CM

## 2020-08-26 DIAGNOSIS — R79.89 LOW TESTOSTERONE: Primary | ICD-10-CM

## 2020-08-26 DIAGNOSIS — I48.0 PAF (PAROXYSMAL ATRIAL FIBRILLATION): ICD-10-CM

## 2020-08-26 RX ORDER — VALSARTAN 80 MG/1
TABLET ORAL
Qty: 30 TABLET | Refills: 0 | Status: SHIPPED | OUTPATIENT
Start: 2020-08-26 | End: 2020-12-03

## 2020-08-26 RX ORDER — VALSARTAN 80 MG/1
TABLET ORAL
Qty: 30 TABLET | Refills: 0 | Status: SHIPPED | OUTPATIENT
Start: 2020-08-26 | End: 2020-09-19 | Stop reason: SDUPTHER

## 2020-08-27 ENCOUNTER — OFFICE VISIT (OUTPATIENT)
Dept: ENDOCRINOLOGY | Facility: CLINIC | Age: 50
End: 2020-08-27
Payer: COMMERCIAL

## 2020-08-27 VITALS
HEART RATE: 84 BPM | SYSTOLIC BLOOD PRESSURE: 114 MMHG | DIASTOLIC BLOOD PRESSURE: 78 MMHG | BODY MASS INDEX: 29.72 KG/M2 | HEIGHT: 67 IN | WEIGHT: 189.38 LBS

## 2020-08-27 DIAGNOSIS — R79.89 LOW TESTOSTERONE: Primary | ICD-10-CM

## 2020-08-27 DIAGNOSIS — I10 HYPERTENSION, UNSPECIFIED TYPE: ICD-10-CM

## 2020-08-27 DIAGNOSIS — R53.83 FATIGUE, UNSPECIFIED TYPE: ICD-10-CM

## 2020-08-27 PROCEDURE — 3008F PR BODY MASS INDEX (BMI) DOCUMENTED: ICD-10-PCS | Mod: CPTII,S$GLB,, | Performed by: INTERNAL MEDICINE

## 2020-08-27 PROCEDURE — 3008F BODY MASS INDEX DOCD: CPT | Mod: CPTII,S$GLB,, | Performed by: INTERNAL MEDICINE

## 2020-08-27 PROCEDURE — 99204 PR OFFICE/OUTPT VISIT, NEW, LEVL IV, 45-59 MIN: ICD-10-PCS | Mod: S$GLB,,, | Performed by: INTERNAL MEDICINE

## 2020-08-27 PROCEDURE — 3074F SYST BP LT 130 MM HG: CPT | Mod: CPTII,S$GLB,, | Performed by: INTERNAL MEDICINE

## 2020-08-27 PROCEDURE — 99204 OFFICE O/P NEW MOD 45 MIN: CPT | Mod: S$GLB,,, | Performed by: INTERNAL MEDICINE

## 2020-08-27 PROCEDURE — 99999 PR PBB SHADOW E&M-EST. PATIENT-LVL III: CPT | Mod: PBBFAC,,, | Performed by: INTERNAL MEDICINE

## 2020-08-27 PROCEDURE — 99999 PR PBB SHADOW E&M-EST. PATIENT-LVL III: ICD-10-PCS | Mod: PBBFAC,,, | Performed by: INTERNAL MEDICINE

## 2020-08-27 PROCEDURE — 3074F PR MOST RECENT SYSTOLIC BLOOD PRESSURE < 130 MM HG: ICD-10-PCS | Mod: CPTII,S$GLB,, | Performed by: INTERNAL MEDICINE

## 2020-08-27 PROCEDURE — 3078F PR MOST RECENT DIASTOLIC BLOOD PRESSURE < 80 MM HG: ICD-10-PCS | Mod: CPTII,S$GLB,, | Performed by: INTERNAL MEDICINE

## 2020-08-27 PROCEDURE — 3078F DIAST BP <80 MM HG: CPT | Mod: CPTII,S$GLB,, | Performed by: INTERNAL MEDICINE

## 2020-08-27 RX ORDER — DILTIAZEM HYDROCHLORIDE 120 MG/1
120 CAPSULE, COATED, EXTENDED RELEASE ORAL DAILY
Qty: 30 CAPSULE | Refills: 1 | Status: SHIPPED | OUTPATIENT
Start: 2020-08-27 | End: 2020-10-20 | Stop reason: SDUPTHER

## 2020-08-27 NOTE — PROGRESS NOTES
Referring Provider and  PCP:  Fadi Damico MD    Reason for referral:   Low testosterone  CC:  Low testosterone    HPI:  Renzo Salgado 50 y.o. male  Patient said his testosterone is low.  He said he was feeling weak and sometimes feeling warm,  and he has been having some erection problem.  He has normal sex drive.  History of a trial fibrillation and hypertension.  On 3 blood pressure medications.  History of cardioversion.  History of sleep apnea treated for 2 years with CPAP.    No history of pituitary gland problem, thyroid gland problem, or adrenal gland problem.    Patient is .  He has 14-year-old child.  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    Past Medical History:   Diagnosis Date    Atrial fibrillation     Hypertension     Sleep apnea        Past Surgical History:   Procedure Laterality Date    CARDIOVERSION N/A 10/5/2018    Procedure: CARDIOVERSION;  Surgeon: Abundio Alvarado MD;  Location: [facility] CATH LAB;  Service: Cardiology;  Laterality: N/A;  Anesthesia notified 9/24 gcd    CYSTOSCOPY W/ URETERAL STENT PLACEMENT Left 9/4/2018    Procedure: CYSTOSCOPY, WITH URETERAL STENT INSERTION;  Surgeon: Ricky Baker IV, MD;  Location: [facility] OR;  Service: Urology;  Laterality: Left;    LASER LITHOTRIPSY Left 9/4/2018    Procedure: LITHOTRIPSY, USING LASER;  Surgeon: Ricky Baker IV, MD;  Location: [facility] OR;  Service: Urology;  Laterality: Left;    UPPER GASTROINTESTINAL ENDOSCOPY         Social History     Socioeconomic History    Marital status:      Spouse name: Not on file    Number of children: Not on file    Years of education: Not on file    Highest education level: Not on file   Occupational History    Not on file   Social Needs    Financial resource strain: Not on file    Food insecurity     Worry: Not on file     Inability: Not on file    Transportation needs     Medical: Not on file     Non-medical: Not on file   Tobacco Use    Smoking status: Never Smoker     Smokeless tobacco: Never Used   Substance and Sexual Activity    Alcohol use: No    Drug use: No    Sexual activity: Not on file   Lifestyle    Physical activity     Days per week: Not on file     Minutes per session: Not on file    Stress: Not on file   Relationships    Social connections     Talks on phone: Not on file     Gets together: Not on file     Attends Bahai service: Not on file     Active member of club or organization: Not on file     Attends meetings of clubs or organizations: Not on file     Relationship status: Not on file   Other Topics Concern    Not on file   Social History Narrative    Not on file         ROS:   Included in HPI  ROS otherwise neg except for what is mentioned in the PMH, PSH and HPI    PE:  Vitals:    08/27/20 1413   BP: 114/78   Pulse: 84     Alert and oriented  No acute distress  No acne  No Proptosis or conjunctivitis  No rash on tongue, + teeth  No goitre by inspection  Thyroid gland is not palpable  No cervical lymphadenopathy  Heart reg, no gallop  Lungs cta, no wheezing  Abd soft, no tnd  No edema in lower legs  No rash  No bruises  Speech normal  Behavior normal  No tremor  Abd obesity  Body mass index is 29.66 kg/m².      Lab:    Lab Results   Component Value Date    TSH 1.852 08/21/2020       No components found for: AGBA1C  Lab Results   Component Value Date    CHOL 153 01/18/2016    TRIG 180 (H) 01/18/2016    HDL 39 (L) 01/18/2016    CHOLHDL 25.5 01/18/2016    TOTALCHOLEST 3.9 01/18/2016    NONHDLCHOL 114 01/18/2016     BMP  Lab Results   Component Value Date     08/21/2020    K 4.6 08/21/2020     08/21/2020    CO2 27 08/21/2020    BUN 11 08/21/2020    CREATININE 1.0 08/21/2020    CALCIUM 9.8 08/21/2020    ANIONGAP 10 08/21/2020    ESTGFRAFRICA >60.0 08/21/2020    EGFRNONAA >60.0 08/21/2020       A/P:  Low testosterone  Morning testosterone was 240  Hypogonadism to be ruled out  Low testosterone is in association with the obesity  Patient to  lose weight  Beta-blocker may be associated with erectile dysfunction  For now no testosterone prescribed  The following labs to be done:    -     Testosterone Panel; Future; Expected date: 08/27/2020  -     Prolactin; Future; Expected date: 08/27/2020  -     Luteinizing hormone; Future; Expected date: 08/27/2020  -     Follicle stimulating hormone; Future; Expected date: 08/27/2020  -     Cortisol, 8AM; Future; Expected date: 08/27/2020  -     IRON AND TIBC; Future; Expected date: 08/27/2020  -     FERRITIN; Future; Expected date: 08/27/2020    Fatigue, unspecified type    -     Testosterone Panel; Future; Expected date: 08/27/2020  -     Prolactin; Future; Expected date: 08/27/2020  -     Luteinizing hormone; Future; Expected date: 08/27/2020  -     Follicle stimulating hormone; Future; Expected date: 08/27/2020  -     Cortisol, 8AM; Future; Expected date: 08/27/2020  -     IRON AND TIBC; Future; Expected date: 08/27/2020  -     FERRITIN; Future; Expected date: 08/27/2020    Hypertension, unspecified type      Appt in 2 weeks      Pt understands the plan and instructions.

## 2020-09-04 ENCOUNTER — LAB VISIT (OUTPATIENT)
Dept: LAB | Facility: HOSPITAL | Age: 50
End: 2020-09-04
Attending: INTERNAL MEDICINE
Payer: COMMERCIAL

## 2020-09-04 DIAGNOSIS — R79.89 LOW TESTOSTERONE: ICD-10-CM

## 2020-09-04 DIAGNOSIS — R53.83 FATIGUE, UNSPECIFIED TYPE: ICD-10-CM

## 2020-09-04 PROCEDURE — 84146 ASSAY OF PROLACTIN: CPT

## 2020-09-04 PROCEDURE — 82728 ASSAY OF FERRITIN: CPT

## 2020-09-04 PROCEDURE — 83002 ASSAY OF GONADOTROPIN (LH): CPT

## 2020-09-04 PROCEDURE — 82040 ASSAY OF SERUM ALBUMIN: CPT

## 2020-09-04 PROCEDURE — 83540 ASSAY OF IRON: CPT

## 2020-09-04 PROCEDURE — 82533 TOTAL CORTISOL: CPT

## 2020-09-04 PROCEDURE — 83001 ASSAY OF GONADOTROPIN (FSH): CPT

## 2020-09-05 LAB
CORTIS SERPL-MCNC: 5.3 UG/DL (ref 4.3–22.4)
FERRITIN SERPL-MCNC: 279 NG/ML (ref 20–300)
FSH SERPL-ACNC: 3.4 MIU/ML (ref 0.95–11.95)
IRON SERPL-MCNC: 80 UG/DL (ref 45–160)
LH SERPL-ACNC: 1 MIU/ML (ref 0.6–12.1)
PROLACTIN SERPL IA-MCNC: 10.7 NG/ML (ref 3.5–19.4)
SATURATED IRON: 18 % (ref 20–50)
TOTAL IRON BINDING CAPACITY: 435 UG/DL (ref 250–450)
TRANSFERRIN SERPL-MCNC: 294 MG/DL (ref 200–375)

## 2020-09-10 ENCOUNTER — PATIENT OUTREACH (OUTPATIENT)
Dept: ADMINISTRATIVE | Facility: OTHER | Age: 50
End: 2020-09-10

## 2020-09-11 ENCOUNTER — OFFICE VISIT (OUTPATIENT)
Dept: ENDOCRINOLOGY | Facility: CLINIC | Age: 50
End: 2020-09-11
Payer: COMMERCIAL

## 2020-09-11 DIAGNOSIS — R53.83 FATIGUE, UNSPECIFIED TYPE: Primary | ICD-10-CM

## 2020-09-11 LAB
ALBUMIN SERPL-MCNC: 4.2 G/DL (ref 3.6–5.1)
SHBG SERPL-SCNC: 28 NMOL/L (ref 10–50)
TESTOST FREE SERPL-MCNC: 67.6 PG/ML (ref 46–224)
TESTOST SERPL-MCNC: 437 NG/DL (ref 250–1100)
TESTOSTERONE.FREE+WB SERPL-MCNC: 130.3 NG/DL (ref 110–575)

## 2020-09-11 PROCEDURE — 99213 PR OFFICE/OUTPT VISIT, EST, LEVL III, 20-29 MIN: ICD-10-PCS | Mod: 95,,, | Performed by: INTERNAL MEDICINE

## 2020-09-11 PROCEDURE — 99213 OFFICE O/P EST LOW 20 MIN: CPT | Mod: 95,,, | Performed by: INTERNAL MEDICINE

## 2020-09-11 NOTE — PROGRESS NOTES
The patient location is:  Patient Home   The chief complaint leading to consultation is:  Follow-up on labs  Visit type: Virtual visit with synchronous audio and video  Total time spent with patient:  About 8 min      Patient was evaluated by me last visit because of the low testosterone.  Blood test was done after last visit for further evaluation.  Patient has no history of adrenal gland problem or pituitary gland problem.    No complaints of dysphagia, chest pain, shortness breath, nausea, vomiting, edema, rash.    Past medical history  Atrial fibrillation  Hypertension  Sleep apnea    Review of system  As above    Physical exam  Alert and oriented  No acute distress    Labs results for iron studies and Testosterone panel and cortisol reviewed and discussed with the patient    Assessment plan  Fatigue  Testosterone panel came back normal  Cortisol 5.6 in the morning could be abnormal  Further evaluation of cortisol will be needed  Will schedule ACTH stimulation test  Follow-up after the test results come back

## 2020-10-05 ENCOUNTER — PATIENT MESSAGE (OUTPATIENT)
Dept: ADMINISTRATIVE | Facility: HOSPITAL | Age: 50
End: 2020-10-05

## 2020-10-20 DIAGNOSIS — I48.0 PAROXYSMAL ATRIAL FIBRILLATION: ICD-10-CM

## 2020-10-20 DIAGNOSIS — I48.0 PAF (PAROXYSMAL ATRIAL FIBRILLATION): ICD-10-CM

## 2020-10-20 RX ORDER — DILTIAZEM HYDROCHLORIDE 120 MG/1
120 CAPSULE, COATED, EXTENDED RELEASE ORAL DAILY
Qty: 30 CAPSULE | Refills: 0 | Status: SHIPPED | OUTPATIENT
Start: 2020-10-20 | End: 2020-11-30 | Stop reason: SDUPTHER

## 2020-10-20 RX ORDER — METOPROLOL SUCCINATE 100 MG/1
100 TABLET, EXTENDED RELEASE ORAL 2 TIMES DAILY
Qty: 60 TABLET | Refills: 0 | Status: SHIPPED | OUTPATIENT
Start: 2020-10-20 | End: 2020-12-16 | Stop reason: SDUPTHER

## 2020-11-30 DIAGNOSIS — I48.0 PAF (PAROXYSMAL ATRIAL FIBRILLATION): ICD-10-CM

## 2020-12-01 RX ORDER — DILTIAZEM HYDROCHLORIDE 120 MG/1
120 CAPSULE, COATED, EXTENDED RELEASE ORAL DAILY
Qty: 14 CAPSULE | Refills: 0 | Status: SHIPPED | OUTPATIENT
Start: 2020-12-01 | End: 2020-12-16 | Stop reason: SDUPTHER

## 2020-12-16 ENCOUNTER — PATIENT MESSAGE (OUTPATIENT)
Dept: CARDIOLOGY | Facility: CLINIC | Age: 50
End: 2020-12-16

## 2020-12-16 ENCOUNTER — OFFICE VISIT (OUTPATIENT)
Dept: CARDIOLOGY | Facility: CLINIC | Age: 50
End: 2020-12-16
Payer: COMMERCIAL

## 2020-12-16 DIAGNOSIS — I48.0 PAF (PAROXYSMAL ATRIAL FIBRILLATION): Primary | ICD-10-CM

## 2020-12-16 DIAGNOSIS — I48.0 PAF (PAROXYSMAL ATRIAL FIBRILLATION): ICD-10-CM

## 2020-12-16 DIAGNOSIS — I10 ESSENTIAL HYPERTENSION: ICD-10-CM

## 2020-12-16 DIAGNOSIS — G47.33 OSA (OBSTRUCTIVE SLEEP APNEA): ICD-10-CM

## 2020-12-16 DIAGNOSIS — I48.0 PAROXYSMAL ATRIAL FIBRILLATION: ICD-10-CM

## 2020-12-16 PROCEDURE — 99214 PR OFFICE/OUTPT VISIT, EST, LEVL IV, 30-39 MIN: ICD-10-PCS | Mod: 95,,, | Performed by: INTERNAL MEDICINE

## 2020-12-16 PROCEDURE — 99214 OFFICE O/P EST MOD 30 MIN: CPT | Mod: 95,,, | Performed by: INTERNAL MEDICINE

## 2020-12-16 RX ORDER — DILTIAZEM HYDROCHLORIDE 120 MG/1
120 CAPSULE, COATED, EXTENDED RELEASE ORAL DAILY
Qty: 90 CAPSULE | Refills: 1 | Status: SHIPPED | OUTPATIENT
Start: 2020-12-16 | End: 2021-06-16 | Stop reason: SDUPTHER

## 2020-12-16 RX ORDER — METOPROLOL SUCCINATE 100 MG/1
100 TABLET, EXTENDED RELEASE ORAL 2 TIMES DAILY
Qty: 180 TABLET | Refills: 1 | Status: SHIPPED | OUTPATIENT
Start: 2020-12-16 | End: 2021-05-12 | Stop reason: SDUPTHER

## 2020-12-16 RX ORDER — VALSARTAN 80 MG/1
80 TABLET ORAL DAILY
Qty: 90 TABLET | Refills: 1 | Status: SHIPPED | OUTPATIENT
Start: 2020-12-16 | End: 2021-06-02 | Stop reason: SDUPTHER

## 2020-12-16 RX ORDER — DILTIAZEM HYDROCHLORIDE 120 MG/1
120 CAPSULE, COATED, EXTENDED RELEASE ORAL DAILY
Qty: 14 CAPSULE | Refills: 0 | OUTPATIENT
Start: 2020-12-16 | End: 2021-12-16

## 2020-12-16 NOTE — PROGRESS NOTES
Subjective:   Patient ID:  Renzo Salgado is a 50 y.o. male who presents for cardiac consult of Follow-up    The patient location is: home  The chief complaint leading to consultation is: CV follow up    Visit type: audiovisual    Face to Face time with patient: 15 min  25 minutes of total time spent on the encounter, which includes face to face time and non-face to face time preparing to see the patient (eg, review of tests), Obtaining and/or reviewing separately obtained history, Documenting clinical information in the electronic or other health record, Independently interpreting results (not separately reported) and communicating results to the patient/family/caregiver, or Care coordination (not separately reported).         Each patient to whom he or she provides medical services by telemedicine is:  (1) informed of the relationship between the physician and patient and the respective role of any other health care provider with respect to management of the patient; and (2) notified that he or she may decline to receive medical services by telemedicine and may withdraw from such care at any time.    Notes:       Atrial Fibrillation  Symptoms are negative for chest pain, palpitations and shortness of breath. Past medical history includes atrial fibrillation.   Hypertension  Pertinent negatives include no chest pain, palpitations or shortness of breath.   Follow-up  Pertinent negatives include no chest pain.     The patient came in today for cardiac consult of Follow-up    Renzo Salgado is a 50 y.o. male  pt with PAFib on Eliquis, HTN, KIKO, renal stones presents for follow up CV evaluation.     9/10/18  He recently had left ureteroscopy with laser lithotripsy and stone basket extraction and Cystoscopy with placement of left double-J ureteral stent 9/4/18 per Dr. Baker. Prior to surgery, the pt had Afib with RVR. Cardiology was consulted. Pt received IV Cardizem and was placed on a IV Cardizem drip.  He underwent surgery and was admitted for uncontrolled Afib on IV Cardizem. Cardizem 120 mg po added to his regimen.     His cardiologist is Dr. Ned Parker with [facility]. Last Friday he saw Dr. Parker. He may get a DCCV. He had Afib since 2011.  He was also on Multaq but has been in Afib. He is here for a second opinion. Pt had stress test in 2011 which was negative.   Works with an [employer], from [email or web address].     10/18/18  Pt had RICARDO/DCCV last week which was successful to convert to NSR. Unfortunately he is back in Afib, rate controlled at 80 bpm. Walks everyday without issues. He feels well today. Will refer to sleep apnea. Pt does snore, nocturia, fatigue at times. ECG- AFib HR 80s    4/30/19  Pt had sleep study which was abnormal, needs CPAP. He has started CPAP since Jan, breathing improved, less apneic episodes. No bleeding issues with Eliquis. BP mildly elevated, pt rushed getting here and mildly stressed. Discussed will do repeat DCCV.     8/1/19  Pt feels overall well, is walking well. No CP. Does not feel palpitations. No further hematuria, no bleeding on Eliquis. He had an allergy in July to lisinopril and changed to Diovan.Is compliant with CPAP, doing well. Discussed DCCV if symptoms are worse.      12/16/20  He does not feel anymore AFib, he is using CPAP more. BP well controlled - 120/70s, HR - 70s. He does stationary bike and HR 70s-80s.    Patient feels no chest pain, no sob, no leg swelling, no PND, no palpitation, no dizziness, no syncope, no CNS symptoms.     Patient has fairly good exercise tolerance.    Patient is compliant with medications.    2D ECHO  4/2016  CONCLUSIONS     1 - Concentric remodeling.     2 - Normal left ventricular systolic function (EF 55-60%).     3 - Normal left ventricular diastolic function.     4 - Normal right ventricular systolic function .     5 - The estimated PA systolic pressure is 31 mmHg.     6 - Mild tricuspid regurgitation.       RICARDO   CONCLUSIONS     1 - No  visualized thrombus in the left atrium with spontaneous echo contrast.     2 - No visualized thrombus in the left atrial appendage.     3 - Normal left ventricular systolic function (EF 55-60%).     4 - Indeterminate LV diastolic function.     5 - Trivial to mild mitral regurgitation.     6 - Trivial tricuspid regurgitation.     This document has been electronically    SIGNED BY: Abundio Alvarado MD On: 10/05/2018 11:02            Past Medical History:   Diagnosis Date    Atrial fibrillation     Hypertension             Past Surgical History:   Procedure Laterality Date    CARDIOVERSION N/A 10/5/2018    Procedure: CARDIOVERSION;  Surgeon: Abundio Alvarado MD;  Location: [facility] CATH LAB;  Service: Cardiology;  Laterality: N/A;  Anesthesia notified 9/24 gcd    CYSTOSCOPY W/ URETERAL STENT PLACEMENT Left 9/4/2018    Procedure: CYSTOSCOPY, WITH URETERAL STENT INSERTION;  Surgeon: Ricky Baker IV, MD;  Location: [facility] OR;  Service: Urology;  Laterality: Left;    LASER LITHOTRIPSY Left 9/4/2018    Procedure: LITHOTRIPSY, USING LASER;  Surgeon: Ricky Baker IV, MD;  Location: [facility] OR;  Service: Urology;  Laterality: Left;    UPPER GASTROINTESTINAL ENDOSCOPY         Social History     Tobacco Use    Smoking status: Never Smoker    Smokeless tobacco: Never Used   Substance Use Topics    Alcohol use: No    Drug use: No       Family History   Problem Relation Age of Onset    Hypertension Mother     Melanoma Neg Hx     Psoriasis Neg Hx     Lupus Neg Hx        Patient's Medications   New Prescriptions    No medications on file   Previous Medications    EPINEPHRINE (EPIPEN) 0.3 MG/0.3 ML ATIN    Inject 0.3 mLs (0.3 mg total) into the muscle as needed.   Modified Medications    Modified Medication Previous Medication    APIXABAN (ELIQUIS) 5 MG TAB apixaban (ELIQUIS) 5 mg Tab       Take 1 tablet (5 mg total) by mouth 2 (two) times daily.    Take 1 tablet (5 mg total) by mouth 2 (two) times daily.    DILTIAZEM (CARDIZEM  CD) 120 MG CP24 diltiaZEM (CARDIZEM CD) 120 MG Cp24       Take 1 capsule (120 mg total) by mouth once daily.    Take 1 capsule (120 mg total) by mouth once daily.    METOPROLOL SUCCINATE (TOPROL-XL) 100 MG 24 HR TABLET metoprolol succinate (TOPROL-XL) 100 MG 24 hr tablet       Take 1 tablet (100 mg total) by mouth 2 (two) times daily.    Take 1 tablet (100 mg total) by mouth 2 (two) times daily.    VALSARTAN (DIOVAN) 80 MG TABLET valsartan (DIOVAN) 80 MG tablet       Take 1 tablet (80 mg total) by mouth once daily.    Take 1 tablet (80 mg total) by mouth once daily.   Discontinued Medications    VALSARTAN (DIOVAN) 80 MG TABLET    Take 1 tablet (80 mg total) by mouth once daily.    VALSARTAN (DIOVAN) 80 MG TABLET    TAKE 1 TABLET(80 MG) BY MOUTH EVERY DAY       Review of Systems   Constitutional: Negative.    HENT: Negative.    Eyes: Negative.    Respiratory: Negative.  Negative for shortness of breath.    Cardiovascular: Negative.  Negative for chest pain and palpitations.   Gastrointestinal: Negative.    Genitourinary: Negative for hematuria.   Musculoskeletal: Negative.    Skin: Negative.    Neurological: Negative.    Endo/Heme/Allergies: Negative.    Psychiatric/Behavioral: Negative.    All 12 systems otherwise negative.      Wt Readings from Last 3 Encounters:   08/27/20 85.9 kg (189 lb 6 oz)   07/24/20 85.9 kg (189 lb 6 oz)   06/25/20 88.6 kg (195 lb 5.2 oz)     Temp Readings from Last 3 Encounters:   07/24/20 97.7 °F (36.5 °C) (Tympanic)   06/25/20 98.3 °F (36.8 °C) (Oral)   05/22/19 97.9 °F (36.6 °C)     BP Readings from Last 3 Encounters:   08/27/20 114/78   07/24/20 112/80   06/25/20 137/89     Pulse Readings from Last 3 Encounters:   08/27/20 84   07/24/20 73   06/25/20 85       There were no vitals taken for this visit.    Objective:   Physical Exam   Constitutional: He is oriented to person, place, and time. He appears well-developed and well-nourished. No distress.   HENT:   Head: Normocephalic and  atraumatic.   Mouth/Throat: No oropharyngeal exudate.   Eyes: Right eye exhibits no discharge. Left eye exhibits no discharge. No scleral icterus.   Pulmonary/Chest: Effort normal. No respiratory distress.   Neurological: He is alert and oriented to person, place, and time.   Skin: No rash noted. He is not diaphoretic. No erythema. No pallor.   Psychiatric: He has a normal mood and affect. His behavior is normal. Judgment and thought content normal.       Lab Results   Component Value Date     08/21/2020    K 4.6 08/21/2020     08/21/2020    CO2 27 08/21/2020    BUN 11 08/21/2020    CREATININE 1.0 08/21/2020     08/21/2020    AST 20 08/21/2020    ALT 33 08/21/2020    ALBUMIN 4.2 09/04/2020    PROT 7.8 08/21/2020    BILITOT 0.5 08/21/2020    WBC 7.10 08/21/2020    HGB 15.9 08/21/2020    HCT 48.7 08/21/2020    MCV 93 08/21/2020     08/21/2020    TSH 1.852 08/21/2020    CHOL 153 01/18/2016    HDL 39 (L) 01/18/2016    LDLCALC 78.0 01/18/2016    TRIG 180 (H) 01/18/2016    BNP 85 04/01/2016     Assessment:      1. PAF (paroxysmal atrial fibrillation)    2. Essential hypertension    3. KIKO (obstructive sleep apnea)    4. Paroxysmal atrial fibrillation        Plan:   1.PAFib s/p RICARDO/DCCV 10/2018   - stress negative in past  - cont BB and CCB   - cont Eliquis for AC  - will rate control now, if symptoms occur will refer to EP for ablation vs antiarrythmic vs repeat DCCV - defer for now    2. HTN  - cont meds    3. Sleep apnea   - cont CPAP     Thank you for allowing me to participate in this patient's care. Please do not hesitate to contact me with any questions or concerns. Consult note has been forwarded to the referral physician.

## 2020-12-16 NOTE — TELEPHONE ENCOUNTER
Medication refused due to failing protocol.    Requested Prescriptions   Pending Prescriptions Disp Refills    diltiaZEM (CARDIZEM CD) 120 MG Cp24 14 capsule 0     Sig: Take 1 capsule (120 mg total) by mouth once daily.       Calcium-Channel Blockers Protocol Passed - 12/16/2020 10:34 AM        Passed - Visit with authorizing provider in past 12 months or upcoming 90 days         Passed - Blood Pressure below 139/89 on file in past 12 months      BP Readings from Last 3 Encounters:   08/27/20 114/78   07/24/20 112/80   06/25/20 137/89

## 2021-01-29 ENCOUNTER — PATIENT MESSAGE (OUTPATIENT)
Dept: ADMINISTRATIVE | Facility: HOSPITAL | Age: 51
End: 2021-01-29

## 2021-03-10 ENCOUNTER — PATIENT MESSAGE (OUTPATIENT)
Dept: INTERNAL MEDICINE | Facility: CLINIC | Age: 51
End: 2021-03-10

## 2021-03-10 ENCOUNTER — PATIENT MESSAGE (OUTPATIENT)
Dept: CARDIOLOGY | Facility: CLINIC | Age: 51
End: 2021-03-10

## 2021-03-17 ENCOUNTER — PATIENT MESSAGE (OUTPATIENT)
Dept: ADMINISTRATIVE | Facility: CLINIC | Age: 51
End: 2021-03-17

## 2021-03-20 ENCOUNTER — IMMUNIZATION (OUTPATIENT)
Dept: INTERNAL MEDICINE | Facility: CLINIC | Age: 51
End: 2021-03-20
Payer: COMMERCIAL

## 2021-03-20 DIAGNOSIS — Z23 NEED FOR VACCINATION: Primary | ICD-10-CM

## 2021-03-20 PROCEDURE — 91300 COVID-19, MRNA, LNP-S, PF, 30 MCG/0.3 ML DOSE VACCINE: CPT | Mod: PBBFAC | Performed by: FAMILY MEDICINE

## 2021-04-10 ENCOUNTER — IMMUNIZATION (OUTPATIENT)
Dept: INTERNAL MEDICINE | Facility: CLINIC | Age: 51
End: 2021-04-10
Payer: COMMERCIAL

## 2021-04-10 DIAGNOSIS — Z23 NEED FOR VACCINATION: Primary | ICD-10-CM

## 2021-04-10 PROCEDURE — 91300 COVID-19, MRNA, LNP-S, PF, 30 MCG/0.3 ML DOSE VACCINE: CPT | Mod: S$GLB,,, | Performed by: FAMILY MEDICINE

## 2021-04-10 PROCEDURE — 91300 COVID-19, MRNA, LNP-S, PF, 30 MCG/0.3 ML DOSE VACCINE: ICD-10-PCS | Mod: S$GLB,,, | Performed by: FAMILY MEDICINE

## 2021-04-10 PROCEDURE — 0002A COVID-19, MRNA, LNP-S, PF, 30 MCG/0.3 ML DOSE VACCINE: ICD-10-PCS | Mod: CV19,S$GLB,, | Performed by: FAMILY MEDICINE

## 2021-04-10 PROCEDURE — 0002A COVID-19, MRNA, LNP-S, PF, 30 MCG/0.3 ML DOSE VACCINE: CPT | Mod: CV19,S$GLB,, | Performed by: FAMILY MEDICINE

## 2021-05-12 DIAGNOSIS — I48.0 PAROXYSMAL ATRIAL FIBRILLATION: ICD-10-CM

## 2021-05-12 RX ORDER — METOPROLOL SUCCINATE 100 MG/1
100 TABLET, EXTENDED RELEASE ORAL 2 TIMES DAILY
Qty: 180 TABLET | Refills: 1 | Status: CANCELLED | OUTPATIENT
Start: 2021-05-12

## 2021-05-13 RX ORDER — METOPROLOL SUCCINATE 100 MG/1
100 TABLET, EXTENDED RELEASE ORAL 2 TIMES DAILY
Qty: 180 TABLET | Refills: 1 | Status: SHIPPED | OUTPATIENT
Start: 2021-05-13 | End: 2021-06-16 | Stop reason: SDUPTHER

## 2021-05-27 ENCOUNTER — OFFICE VISIT (OUTPATIENT)
Dept: URGENT CARE | Facility: CLINIC | Age: 51
End: 2021-05-27
Payer: COMMERCIAL

## 2021-05-27 VITALS
BODY MASS INDEX: 29.66 KG/M2 | SYSTOLIC BLOOD PRESSURE: 139 MMHG | WEIGHT: 189 LBS | HEART RATE: 87 BPM | HEIGHT: 67 IN | RESPIRATION RATE: 18 BRPM | DIASTOLIC BLOOD PRESSURE: 90 MMHG | TEMPERATURE: 98 F | OXYGEN SATURATION: 97 %
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
## 2021-05-27 DIAGNOSIS — T63.484A LOCAL REACTION TO INSECT STING, UNDETERMINED INTENT, INITIAL ENCOUNTER: ICD-10-CM

## 2021-05-27 DIAGNOSIS — S00.262A INSECT BITE OF LEFT PERIOCULAR AREA, INITIAL ENCOUNTER: Primary | ICD-10-CM

## 2021-05-27 PROCEDURE — 99214 PR OFFICE/OUTPT VISIT, EST, LEVL IV, 30-39 MIN: ICD-10-PCS | Mod: 25,S$GLB,, | Performed by: NURSE PRACTITIONER

## 2021-05-27 PROCEDURE — 3008F PR BODY MASS INDEX (BMI) DOCUMENTED: ICD-10-PCS | Mod: CPTII,S$GLB,, | Performed by: NURSE PRACTITIONER

## 2021-05-27 PROCEDURE — 1125F PR PAIN SEVERITY QUANTIFIED, PAIN PRESENT: ICD-10-PCS | Mod: S$GLB,,, | Performed by: NURSE PRACTITIONER

## 2021-05-27 PROCEDURE — 96372 PR INJECTION,THERAP/PROPH/DIAG2ST, IM OR SUBCUT: ICD-10-PCS | Mod: S$GLB,,, | Performed by: NURSE PRACTITIONER

## 2021-05-27 PROCEDURE — 99214 OFFICE O/P EST MOD 30 MIN: CPT | Mod: 25,S$GLB,, | Performed by: NURSE PRACTITIONER

## 2021-05-27 PROCEDURE — 3008F BODY MASS INDEX DOCD: CPT | Mod: CPTII,S$GLB,, | Performed by: NURSE PRACTITIONER

## 2021-05-27 PROCEDURE — 1125F AMNT PAIN NOTED PAIN PRSNT: CPT | Mod: S$GLB,,, | Performed by: NURSE PRACTITIONER

## 2021-05-27 PROCEDURE — 96372 THER/PROPH/DIAG INJ SC/IM: CPT | Mod: S$GLB,,, | Performed by: NURSE PRACTITIONER

## 2021-05-27 RX ORDER — BETAMETHASONE SODIUM PHOSPHATE AND BETAMETHASONE ACETATE 3; 3 MG/ML; MG/ML
9 INJECTION, SUSPENSION INTRA-ARTICULAR; INTRALESIONAL; INTRAMUSCULAR; SOFT TISSUE
Status: COMPLETED | OUTPATIENT
Start: 2021-05-27 | End: 2021-05-27

## 2021-05-27 RX ADMIN — BETAMETHASONE SODIUM PHOSPHATE AND BETAMETHASONE ACETATE 9 MG: 3; 3 INJECTION, SUSPENSION INTRA-ARTICULAR; INTRALESIONAL; INTRAMUSCULAR; SOFT TISSUE at 12:05

## 2021-06-12 DIAGNOSIS — I48.0 PAF (PAROXYSMAL ATRIAL FIBRILLATION): ICD-10-CM

## 2021-06-16 ENCOUNTER — HOSPITAL ENCOUNTER (OUTPATIENT)
Dept: CARDIOLOGY | Facility: HOSPITAL | Age: 51
Discharge: HOME | End: 2021-06-16
Attending: INTERNAL MEDICINE
Payer: COMMERCIAL

## 2021-06-16 ENCOUNTER — OFFICE VISIT (OUTPATIENT)
Dept: CARDIOLOGY | Facility: CLINIC | Age: 51
End: 2021-06-16
Payer: COMMERCIAL

## 2021-06-16 VITALS
OXYGEN SATURATION: 98 % | HEART RATE: 77 BPM | SYSTOLIC BLOOD PRESSURE: 132 MMHG | BODY MASS INDEX: 29.6 KG/M2 | DIASTOLIC BLOOD PRESSURE: 86 MMHG | WEIGHT: 189 LBS

## 2021-06-16 DIAGNOSIS — I48.0 PAF (PAROXYSMAL ATRIAL FIBRILLATION): Primary | ICD-10-CM

## 2021-06-16 DIAGNOSIS — E78.49 OTHER HYPERLIPIDEMIA: ICD-10-CM

## 2021-06-16 DIAGNOSIS — E66.3 OVERWEIGHT WITH BODY MASS INDEX (BMI) 25.0-29.9: ICD-10-CM

## 2021-06-16 DIAGNOSIS — G47.33 OSA (OBSTRUCTIVE SLEEP APNEA): ICD-10-CM

## 2021-06-16 DIAGNOSIS — I48.0 PAF (PAROXYSMAL ATRIAL FIBRILLATION): ICD-10-CM

## 2021-06-16 DIAGNOSIS — I10 ESSENTIAL HYPERTENSION: ICD-10-CM

## 2021-06-16 DIAGNOSIS — I48.0 PAROXYSMAL ATRIAL FIBRILLATION: ICD-10-CM

## 2021-06-16 PROCEDURE — 99999 PR PBB SHADOW E&M-EST. PATIENT-LVL III: CPT | Mod: PBBFAC,,, | Performed by: INTERNAL MEDICINE

## 2021-06-16 PROCEDURE — 99999 PR PBB SHADOW E&M-EST. PATIENT-LVL III: ICD-10-PCS | Mod: PBBFAC,,, | Performed by: INTERNAL MEDICINE

## 2021-06-16 PROCEDURE — 93005 ELECTROCARDIOGRAM TRACING: CPT

## 2021-06-16 PROCEDURE — 93010 ELECTROCARDIOGRAM REPORT: CPT | Mod: ,,, | Performed by: INTERNAL MEDICINE

## 2021-06-16 PROCEDURE — 99214 PR OFFICE/OUTPT VISIT, EST, LEVL IV, 30-39 MIN: ICD-10-PCS | Mod: S$GLB,,, | Performed by: INTERNAL MEDICINE

## 2021-06-16 PROCEDURE — 99214 OFFICE O/P EST MOD 30 MIN: CPT | Mod: S$GLB,,, | Performed by: INTERNAL MEDICINE

## 2021-06-16 PROCEDURE — 3008F PR BODY MASS INDEX (BMI) DOCUMENTED: ICD-10-PCS | Mod: CPTII,S$GLB,, | Performed by: INTERNAL MEDICINE

## 2021-06-16 PROCEDURE — 3008F BODY MASS INDEX DOCD: CPT | Mod: CPTII,S$GLB,, | Performed by: INTERNAL MEDICINE

## 2021-06-16 PROCEDURE — 93010 EKG 12-LEAD: ICD-10-PCS | Mod: ,,, | Performed by: INTERNAL MEDICINE

## 2021-06-16 RX ORDER — DILTIAZEM HYDROCHLORIDE 120 MG/1
120 CAPSULE, COATED, EXTENDED RELEASE ORAL DAILY
Qty: 90 CAPSULE | Refills: 1 | Status: SHIPPED | OUTPATIENT
Start: 2021-06-16 | End: 2021-06-18 | Stop reason: SDUPTHER

## 2021-06-16 RX ORDER — METOPROLOL SUCCINATE 100 MG/1
100 TABLET, EXTENDED RELEASE ORAL 2 TIMES DAILY
Qty: 180 TABLET | Refills: 1 | Status: SHIPPED | OUTPATIENT
Start: 2021-06-16 | End: 2021-11-14 | Stop reason: SDUPTHER

## 2021-06-16 RX ORDER — VALSARTAN 80 MG/1
80 TABLET ORAL DAILY
Qty: 90 TABLET | Refills: 1 | Status: SHIPPED | OUTPATIENT
Start: 2021-06-16 | End: 2021-12-17 | Stop reason: SDUPTHER

## 2021-06-17 DIAGNOSIS — E78.49 OTHER HYPERLIPIDEMIA: ICD-10-CM

## 2021-06-17 DIAGNOSIS — E66.3 OVERWEIGHT WITH BODY MASS INDEX (BMI) 25.0-29.9: ICD-10-CM

## 2021-06-17 DIAGNOSIS — G47.33 OSA (OBSTRUCTIVE SLEEP APNEA): ICD-10-CM

## 2021-06-17 DIAGNOSIS — I48.0 PAF (PAROXYSMAL ATRIAL FIBRILLATION): ICD-10-CM

## 2021-06-17 DIAGNOSIS — I10 ESSENTIAL HYPERTENSION: Primary | ICD-10-CM

## 2021-06-18 DIAGNOSIS — I48.0 PAF (PAROXYSMAL ATRIAL FIBRILLATION): ICD-10-CM

## 2021-06-18 RX ORDER — DILTIAZEM HYDROCHLORIDE 120 MG/1
120 CAPSULE, COATED, EXTENDED RELEASE ORAL DAILY
Qty: 90 CAPSULE | Refills: 1 | Status: SHIPPED | OUTPATIENT
Start: 2021-06-18 | End: 2021-09-19 | Stop reason: SDUPTHER

## 2021-07-06 ENCOUNTER — PATIENT MESSAGE (OUTPATIENT)
Dept: ADMINISTRATIVE | Facility: HOSPITAL | Age: 51
End: 2021-07-06

## 2021-07-07 ENCOUNTER — PATIENT OUTREACH (OUTPATIENT)
Dept: ADMINISTRATIVE | Facility: HOSPITAL | Age: 51
End: 2021-07-07

## 2021-07-23 ENCOUNTER — PATIENT MESSAGE (OUTPATIENT)
Dept: PULMONOLOGY | Facility: CLINIC | Age: 51
End: 2021-07-23

## 2021-07-26 ENCOUNTER — LAB VISIT (OUTPATIENT)
Dept: LAB | Facility: HOSPITAL | Age: 51
End: 2021-07-26
Attending: INTERNAL MEDICINE
Payer: COMMERCIAL

## 2021-07-26 DIAGNOSIS — Z00.00 ROUTINE GENERAL MEDICAL EXAMINATION AT A HEALTH CARE FACILITY: ICD-10-CM

## 2021-07-26 PROCEDURE — 82274 ASSAY TEST FOR BLOOD FECAL: CPT | Performed by: INTERNAL MEDICINE

## 2021-08-03 LAB — HEMOCCULT STL QL IA: NEGATIVE

## 2021-09-17 ENCOUNTER — LAB VISIT (OUTPATIENT)
Dept: LAB | Facility: HOSPITAL | Age: 51
End: 2021-09-17
Attending: INTERNAL MEDICINE
Payer: COMMERCIAL

## 2021-09-17 DIAGNOSIS — E78.49 OTHER HYPERLIPIDEMIA: ICD-10-CM

## 2021-09-17 LAB
CHOLEST SERPL-MCNC: 156 MG/DL (ref 120–199)
CHOLEST/HDLC SERPL: 4 {RATIO} (ref 2–5)
HDLC SERPL-MCNC: 39 MG/DL (ref 40–75)
HDLC SERPL: 25 % (ref 20–50)
LDLC SERPL CALC-MCNC: 76.6 MG/DL (ref 63–159)
NONHDLC SERPL-MCNC: 117 MG/DL
TRIGL SERPL-MCNC: 202 MG/DL (ref 30–150)

## 2021-09-17 PROCEDURE — 80061 LIPID PANEL: CPT | Performed by: INTERNAL MEDICINE

## 2021-09-17 PROCEDURE — 36415 COLL VENOUS BLD VENIPUNCTURE: CPT | Performed by: INTERNAL MEDICINE

## 2021-09-19 DIAGNOSIS — I48.0 PAF (PAROXYSMAL ATRIAL FIBRILLATION): ICD-10-CM

## 2021-09-20 RX ORDER — DILTIAZEM HYDROCHLORIDE 120 MG/1
120 CAPSULE, COATED, EXTENDED RELEASE ORAL DAILY
Qty: 90 CAPSULE | Refills: 1 | Status: SHIPPED | OUTPATIENT
Start: 2021-09-20 | End: 2021-12-17 | Stop reason: SDUPTHER

## 2021-11-14 DIAGNOSIS — I48.0 PAROXYSMAL ATRIAL FIBRILLATION: ICD-10-CM

## 2021-11-15 RX ORDER — METOPROLOL SUCCINATE 100 MG/1
100 TABLET, EXTENDED RELEASE ORAL 2 TIMES DAILY
Qty: 180 TABLET | Refills: 1 | Status: SHIPPED | OUTPATIENT
Start: 2021-11-15 | End: 2021-12-17 | Stop reason: SDUPTHER

## 2021-12-17 ENCOUNTER — HOSPITAL ENCOUNTER (OUTPATIENT)
Dept: CARDIOLOGY | Facility: HOSPITAL | Age: 51
Discharge: HOME | End: 2021-12-17
Attending: INTERNAL MEDICINE
Payer: COMMERCIAL

## 2021-12-17 ENCOUNTER — OFFICE VISIT (OUTPATIENT)
Dept: CARDIOLOGY | Facility: CLINIC | Age: 51
End: 2021-12-17
Payer: COMMERCIAL

## 2021-12-17 VITALS
DIASTOLIC BLOOD PRESSURE: 86 MMHG | OXYGEN SATURATION: 96 % | SYSTOLIC BLOOD PRESSURE: 132 MMHG | BODY MASS INDEX: 29.66 KG/M2 | WEIGHT: 189 LBS | HEART RATE: 76 BPM | HEIGHT: 67 IN | RESPIRATION RATE: 16 BRPM

## 2021-12-17 DIAGNOSIS — I10 ESSENTIAL HYPERTENSION: Primary | ICD-10-CM

## 2021-12-17 DIAGNOSIS — I48.0 PAROXYSMAL ATRIAL FIBRILLATION: ICD-10-CM

## 2021-12-17 DIAGNOSIS — I48.0 PAF (PAROXYSMAL ATRIAL FIBRILLATION): ICD-10-CM

## 2021-12-17 DIAGNOSIS — E78.49 OTHER HYPERLIPIDEMIA: ICD-10-CM

## 2021-12-17 DIAGNOSIS — E66.3 OVERWEIGHT WITH BODY MASS INDEX (BMI) 25.0-29.9: ICD-10-CM

## 2021-12-17 DIAGNOSIS — G47.33 OSA (OBSTRUCTIVE SLEEP APNEA): ICD-10-CM

## 2021-12-17 PROCEDURE — 99999 PR PBB SHADOW E&M-EST. PATIENT-LVL III: ICD-10-PCS | Mod: PBBFAC,,, | Performed by: INTERNAL MEDICINE

## 2021-12-17 PROCEDURE — 93010 ELECTROCARDIOGRAM REPORT: CPT | Mod: ,,, | Performed by: INTERNAL MEDICINE

## 2021-12-17 PROCEDURE — 4010F ACE/ARB THERAPY RXD/TAKEN: CPT | Mod: CPTII,S$GLB,, | Performed by: INTERNAL MEDICINE

## 2021-12-17 PROCEDURE — 99214 OFFICE O/P EST MOD 30 MIN: CPT | Mod: S$GLB,,, | Performed by: INTERNAL MEDICINE

## 2021-12-17 PROCEDURE — 99214 PR OFFICE/OUTPT VISIT, EST, LEVL IV, 30-39 MIN: ICD-10-PCS | Mod: S$GLB,,, | Performed by: INTERNAL MEDICINE

## 2021-12-17 PROCEDURE — 93005 ELECTROCARDIOGRAM TRACING: CPT

## 2021-12-17 PROCEDURE — 93010 EKG 12-LEAD: ICD-10-PCS | Mod: ,,, | Performed by: INTERNAL MEDICINE

## 2021-12-17 PROCEDURE — 99999 PR PBB SHADOW E&M-EST. PATIENT-LVL III: CPT | Mod: PBBFAC,,, | Performed by: INTERNAL MEDICINE

## 2021-12-17 PROCEDURE — 4010F PR ACE/ARB THEARPY RXD/TAKEN: ICD-10-PCS | Mod: CPTII,S$GLB,, | Performed by: INTERNAL MEDICINE

## 2021-12-17 RX ORDER — VALSARTAN 80 MG/1
80 TABLET ORAL DAILY
Qty: 90 TABLET | Refills: 1 | Status: SHIPPED | OUTPATIENT
Start: 2021-12-17 | End: 2022-06-17 | Stop reason: SDUPTHER

## 2021-12-17 RX ORDER — METOPROLOL SUCCINATE 100 MG/1
100 TABLET, EXTENDED RELEASE ORAL 2 TIMES DAILY
Qty: 180 TABLET | Refills: 1 | Status: SHIPPED | OUTPATIENT
Start: 2021-12-17 | End: 2022-02-06 | Stop reason: SDUPTHER

## 2021-12-17 RX ORDER — DILTIAZEM HYDROCHLORIDE 120 MG/1
120 CAPSULE, COATED, EXTENDED RELEASE ORAL DAILY
Qty: 90 CAPSULE | Refills: 1 | Status: SHIPPED | OUTPATIENT
Start: 2021-12-17 | End: 2022-06-17 | Stop reason: SDUPTHER

## 2021-12-20 DIAGNOSIS — I48.0 PAF (PAROXYSMAL ATRIAL FIBRILLATION): Primary | ICD-10-CM

## 2021-12-27 ENCOUNTER — IMMUNIZATION (OUTPATIENT)
Dept: PRIMARY CARE CLINIC | Facility: CLINIC | Age: 51
End: 2021-12-27
Payer: COMMERCIAL

## 2021-12-27 DIAGNOSIS — Z23 NEED FOR VACCINATION: Primary | ICD-10-CM

## 2021-12-27 PROCEDURE — 0004A COVID-19, MRNA, LNP-S, PF, 30 MCG/0.3 ML DOSE VACCINE: CPT | Mod: CV19,PBBFAC | Performed by: FAMILY MEDICINE

## 2021-12-30 ENCOUNTER — PATIENT MESSAGE (OUTPATIENT)
Dept: PULMONOLOGY | Facility: CLINIC | Age: 51
End: 2021-12-30
Payer: COMMERCIAL

## 2021-12-31 DIAGNOSIS — G47.33 OSA ON CPAP: Primary | ICD-10-CM

## 2022-02-02 DIAGNOSIS — I10 ESSENTIAL HYPERTENSION: ICD-10-CM

## 2022-02-06 DIAGNOSIS — I48.0 PAF (PAROXYSMAL ATRIAL FIBRILLATION): ICD-10-CM

## 2022-02-06 DIAGNOSIS — I48.0 PAROXYSMAL ATRIAL FIBRILLATION: ICD-10-CM

## 2022-02-07 RX ORDER — METOPROLOL SUCCINATE 100 MG/1
100 TABLET, EXTENDED RELEASE ORAL 2 TIMES DAILY
Qty: 180 TABLET | Refills: 1 | Status: SHIPPED | OUTPATIENT
Start: 2022-02-07 | End: 2022-06-17 | Stop reason: SDUPTHER

## 2022-04-27 ENCOUNTER — PATIENT MESSAGE (OUTPATIENT)
Dept: ADMINISTRATIVE | Facility: HOSPITAL | Age: 52
End: 2022-04-27
Payer: COMMERCIAL

## 2022-05-06 ENCOUNTER — OFFICE VISIT (OUTPATIENT)
Dept: URGENT CARE | Facility: CLINIC | Age: 52
End: 2022-05-06
Payer: COMMERCIAL

## 2022-05-06 VITALS
HEIGHT: 67 IN | OXYGEN SATURATION: 97 % | WEIGHT: 184 LBS | TEMPERATURE: 98 F | HEART RATE: 101 BPM | RESPIRATION RATE: 18 BRPM | DIASTOLIC BLOOD PRESSURE: 84 MMHG | BODY MASS INDEX: 28.88 KG/M2 | SYSTOLIC BLOOD PRESSURE: 135 MMHG

## 2022-05-06 DIAGNOSIS — U07.1 COVID-19: Primary | ICD-10-CM

## 2022-05-06 DIAGNOSIS — U07.1 COVID-19 VIRUS DETECTED: ICD-10-CM

## 2022-05-06 LAB
CTP QC/QA: YES
SARS-COV-2 RDRP RESP QL NAA+PROBE: POSITIVE

## 2022-05-06 PROCEDURE — 3075F SYST BP GE 130 - 139MM HG: CPT | Mod: CPTII,S$GLB,, | Performed by: PHYSICIAN ASSISTANT

## 2022-05-06 PROCEDURE — 1160F PR REVIEW ALL MEDS BY PRESCRIBER/CLIN PHARMACIST DOCUMENTED: ICD-10-PCS | Mod: CPTII,S$GLB,, | Performed by: PHYSICIAN ASSISTANT

## 2022-05-06 PROCEDURE — 1159F PR MEDICATION LIST DOCUMENTED IN MEDICAL RECORD: ICD-10-PCS | Mod: CPTII,S$GLB,, | Performed by: PHYSICIAN ASSISTANT

## 2022-05-06 PROCEDURE — U0002: ICD-10-PCS | Mod: QW,S$GLB,, | Performed by: PHYSICIAN ASSISTANT

## 2022-05-06 PROCEDURE — U0002 COVID-19 LAB TEST NON-CDC: HCPCS | Mod: QW,S$GLB,, | Performed by: PHYSICIAN ASSISTANT

## 2022-05-06 PROCEDURE — 3075F PR MOST RECENT SYSTOLIC BLOOD PRESS GE 130-139MM HG: ICD-10-PCS | Mod: CPTII,S$GLB,, | Performed by: PHYSICIAN ASSISTANT

## 2022-05-06 PROCEDURE — 3008F PR BODY MASS INDEX (BMI) DOCUMENTED: ICD-10-PCS | Mod: CPTII,S$GLB,, | Performed by: PHYSICIAN ASSISTANT

## 2022-05-06 PROCEDURE — 4010F PR ACE/ARB THEARPY RXD/TAKEN: ICD-10-PCS | Mod: CPTII,S$GLB,, | Performed by: PHYSICIAN ASSISTANT

## 2022-05-06 PROCEDURE — 3079F DIAST BP 80-89 MM HG: CPT | Mod: CPTII,S$GLB,, | Performed by: PHYSICIAN ASSISTANT

## 2022-05-06 PROCEDURE — 1160F RVW MEDS BY RX/DR IN RCRD: CPT | Mod: CPTII,S$GLB,, | Performed by: PHYSICIAN ASSISTANT

## 2022-05-06 PROCEDURE — 4010F ACE/ARB THERAPY RXD/TAKEN: CPT | Mod: CPTII,S$GLB,, | Performed by: PHYSICIAN ASSISTANT

## 2022-05-06 PROCEDURE — 99213 OFFICE O/P EST LOW 20 MIN: CPT | Mod: S$GLB,,, | Performed by: PHYSICIAN ASSISTANT

## 2022-05-06 PROCEDURE — 3008F BODY MASS INDEX DOCD: CPT | Mod: CPTII,S$GLB,, | Performed by: PHYSICIAN ASSISTANT

## 2022-05-06 PROCEDURE — 1159F MED LIST DOCD IN RCRD: CPT | Mod: CPTII,S$GLB,, | Performed by: PHYSICIAN ASSISTANT

## 2022-05-06 PROCEDURE — 3079F PR MOST RECENT DIASTOLIC BLOOD PRESSURE 80-89 MM HG: ICD-10-PCS | Mod: CPTII,S$GLB,, | Performed by: PHYSICIAN ASSISTANT

## 2022-05-06 PROCEDURE — 99213 PR OFFICE/OUTPT VISIT, EST, LEVL III, 20-29 MIN: ICD-10-PCS | Mod: S$GLB,,, | Performed by: PHYSICIAN ASSISTANT

## 2022-05-06 NOTE — PATIENT INSTRUCTIONS
Your test was POSITIVE for COVID-19 (coronavirus).       Please isolate yourself at home.  You may leave home and/or return to work once the following conditions are met:    If you were not hospitalized and are not severely immunocompromised*:  More than 10 days since symptoms first appeared AND  More than 24 hours fever free without medications AND  Symptoms have improved     If you were hospitalized OR are severely immunocompromised*:  More than 20 days since symptoms first appeared  More than 24 hours fever free without medications  Symptoms have improved    If you had no symptoms but tested positive:  More than 10 days since the date of the first positive test (20 days if severely immunocompromised).   If you develop symptoms, then use the guidelines above.     *Definition of severely immunocompromised:  Current chemotherapy for cancer  Untreated HIV with CD4 count less than 200  Combined primary immunodeficiency disorder  Prednisone more than 20 mg per day for more than 14 days  Post-transplant patients    Additional instructions:  Separate yourself from other people and animals in your home.  Call ahead before visiting your doctor.  Wear a facemask when around others.  Cover your coughs and sneezes.  Wash your hands often with soap and water; hand  can be used, too.  Avoid sharing personal household items.  Wipe down surfaces used daily.  Monitor your symptoms. Seek prompt medical attention if your illness is worsening (e.g., difficulty breathing).   Before seeking care, call your healthcare provider.  If you have a medical emergency and need to call 911, notify the dispatch personnel that you have, or are being evaluated for COVID-19. If possible, put on a facemask before emergency medical services arrive.      Contact Tracing    As one of the next steps, you will receive a call or text from the [place] Department of Health ([facility]) COVID-19 Tracing Team. See the contact information below so you know  not to ignore the health departments call. It is important that you contact them back immediately so they can help.      Contact Tracer Number:  [phone]  Caller ID for most carriers: [place] [facility]     What is contact tracing?  Contact tracing is a process that helps identify everyone who has been in close contact with an infected person. Contact tracers let those people know they may have been exposed and guide them on next steps. Confidentiality is important for everyone; no one will be told who may have exposed them to the virus.  Your involvement is important. The more we know about where and how this virus is spreading, the better chance we have at stopping it from spreading further.  What does exposure mean?  Exposure means you have been within 6 feet for more than 15 minutes with a person who has or had COVID-19.  What kind of questions do the contact tracers ask?  A contact tracer will confirm your basic contact information including name, address, phone number, and next of kin, as well as asking about any symptoms you may have had. Theyll also ask you how you think you may have gotten sick, such as places where you may have been exposed to the virus, and people you were with. Those names will never be shared with anyone outside of that call, and will only be used to help trace and stop the spread of the virus.   I have privacy concerns. How will the state use my information?  Your privacy about your health is important. All calls are completed using call centers that use the appropriate health privacy protection measures (HIPAA compliance), meaning that your patient information is safe. No one will ever ask you any questions related to immigration status. Your health comes first.   Do I have to participate?  You do not have to participate, but we strongly encourage you to. Contact tracing can help us catch and control new outbreaks as theyre developing to keep your friends and family safe.   What  if I dont hear from anyone?  If you dont receive a call within 24 hours, you can call the number above right away to inquire about your status. That line is open from 8:00 am - 8:00 p.m., 7 days a week.  Contact tracing saves lives! Together, we have the power to beat this virus and keep our loved ones and neighbors safe.    For more information see CDC link below.      https://www.cdc.gov/coronavirus/2019-ncov/hcp/guidance-prevent-spread.html#precautions        Sources:  [facility], [place] [facility]           Sincerely,     Verónica Gregorio PA-C    PLEASE READ YOUR DISCHARGE INSTRUCTIONS ENTIRELY AS IT CONTAINS IMPORTANT INFORMATION.      Please drink plenty of fluids.    Please get plenty of rest.    Please return here or go to the Emergency Department for any concerns or worsening of condition.    Please take an over the counter antihistamine medication (allegra/Claritin/Zyrtec) of your choice as directed.    Try an over the counter decongestant like Mucinex D or Sudafed. You buy this behind the pharmacy counter    If you do have Hypertension or palpitations, it is safe to take Coricidin HBP for relief of sinus symptoms.    If not allergic, please take over the counter Tylenol (Acetaminophen) and/or Motrin (Ibuprofen) as directed for control of pain and/or fever.  Please follow up with your primary care doctor or specialist as needed.    Sore throat recommendations: Warm fluids, warm salt water gargles, throat lozenges, tea, honey, soup, rest, hydration.    Use over the counter flonase: one spray each nostril twice daily OR two sprays each nostril once daily.     Sinus rinses DO NOT USE TAP WATER, if you must, water must be a rolling boil for 1 minute, let it cool, then use.  May use distilled water, or over the counter nasal saline rinses.  Vics vapor rub in shower to help open nasal passages.  May use nasal gel to keep passages moisturized.  May use Nasal saline sprays during the day for  added relief of congestion.   For those who go to the gym, please do not use the sauna or steam room now to clear sinuses.    If you  smoke, please stop smoking.      Please return or see your primary care doctor if you develop new or worsening symptoms.     Please arrange follow up with your primary medical clinic as soon as possible. You must understand that you've received an Urgent Care treatment only and that you may be released before all of your medical problems are known or treated. You, the patient, will arrange for follow up as instructed. If your symptoms worsen or fail to improve you should go to the Emergency Room.    [facility] COVID QUARANTINE     Quarantine  Quarantine if you have been in close contact (within 6 feet of someone for a cumulative total of 15 minutes or more over a 24-hour period) with someone who has COVID-19, unless you have been fully vaccinated or had a positive test within 90 days and are no longer symptomatic.  People who are fully vaccinated and/or had a positive test within 90 days do NOT need to quarantine after contact with someone who had COVID-19 unless they have symptoms. However, fully vaccinated people who have not had a positive test within 90 days, should get tested 3-5 days after their exposure, even if they don't have symptoms and wear a mask indoors in public for 14 days following exposure or until their test result is negative.    If you have not been vaccinated, and don't have a positive test within 90 days, your quarantine is 10 days without a test, or you can choose to test out of quarantine.   After 5-7 days without symptoms, you can test at that point and you can come out of quarantine on day 8 if negative and still without symptoms.   The risk is that if you test without symptoms on Day 6 (for example) and you are positive, your 10 day quarantine begins on that day, and you turned your 7 day quarantine into 16 days.

## 2022-05-06 NOTE — LETTER
[id number] [facility] [address] ? [place], [address] ? Phone [phone] ? Fax             Return to Work/School    Patient: Renzo Salgado  YOB: 1970   Date: 05/06/2022      To Whom It May Concern:     Renzo Salgado was in contact with/seen in my office on 05/06/2022. COVID-19 is present in our communities across the [facility]. Not all patients are eligible or appropriate to be tested. In this situation, your employee meets the following criteria:     Renzo Salgado has met the criteria for COVID-19 testing and has a POSITIVE result. He can return to work once they are asymptomatic for 24 hours without the use of fever reducing medications AND at least five days from the start of symptoms (or from the first positive result if they have no symptoms).      If you have any questions or concerns, or if I can be of further assistance, please do not hesitate to contact me.     Sincerely,    Verónica Gregorio PA-C

## 2022-05-06 NOTE — PROGRESS NOTES
"Subjective:       Patient ID: Renzo Salgado is a 51 y.o. male.    Vitals:  height is 5' 7" (1.702 m) and weight is 83.5 kg (184 lb). His tympanic temperature is 98.3 °F (36.8 °C). His blood pressure is 135/84 and his pulse is 101. His respiration is 18 and oxygen saturation is 97%.     Chief Complaint: Nasal Congestion    Pt present for sore throat and runny nose that started 4 days ago. Pt states he took the patient states he took tylenol but it provided no relief. Pt states he has been exposed to covid.  Patient denies any fever, chills, chest pain, shortness breast, nausea, vomiting, diarrhea.    Sore Throat   This is a new problem. The current episode started in the past 7 days. The problem has been unchanged. There has been no fever. The pain is at a severity of 0/10. The patient is experiencing no pain. Associated symptoms include congestion, coughing, diarrhea and a hoarse voice. Pertinent negatives include no abdominal pain, drooling, ear discharge, ear pain, headaches, plugged ear sensation, neck pain, shortness of breath, stridor, swollen glands, trouble swallowing or vomiting. Treatments tried: cough syrup, tylenol. The treatment provided no relief.       Constitution: Negative for chills and fever.   HENT: Positive for congestion and sore throat. Negative for ear pain, ear discharge, drooling, postnasal drip, sinus pain, sinus pressure and trouble swallowing.    Neck: Negative for neck pain and painful lymph nodes.   Cardiovascular: Negative for chest pain.   Respiratory: Positive for cough. Negative for sputum production, shortness of breath and stridor.    Gastrointestinal: Positive for diarrhea. Negative for abdominal pain, nausea and vomiting.   Musculoskeletal: Negative for muscle ache.   Neurological: Negative for headaches.   Hematologic/Lymphatic: Negative for swollen lymph nodes.       Objective:      Physical Exam   Constitutional: He is oriented to person, place, and time. He appears " well-developed. He is cooperative.  Non-toxic appearance. He does not appear ill. No distress.   HENT:   Head: Normocephalic and atraumatic.   Ears:   Right Ear: Hearing, tympanic membrane, external ear and ear canal normal.   Left Ear: Hearing, tympanic membrane, external ear and ear canal normal.   Nose: Congestion present. No mucosal edema, rhinorrhea or nasal deformity. No epistaxis. Right sinus exhibits no maxillary sinus tenderness and no frontal sinus tenderness. Left sinus exhibits no maxillary sinus tenderness and no frontal sinus tenderness.   Mouth/Throat: Uvula is midline, oropharynx is clear and moist and mucous membranes are normal. No trismus in the jaw. Normal dentition. No uvula swelling. No oropharyngeal exudate, posterior oropharyngeal edema or posterior oropharyngeal erythema.   Eyes: Conjunctivae and lids are normal. No scleral icterus.   Neck: Trachea normal and phonation normal. Neck supple. No edema present. No erythema present. No neck rigidity present.   Cardiovascular: Normal rate, regular rhythm, normal heart sounds and normal pulses.   No murmur heard.  Pulmonary/Chest: Effort normal and breath sounds normal. No respiratory distress. He has no decreased breath sounds. He has no wheezes. He has no rhonchi.   Abdominal: Normal appearance.   Musculoskeletal: Normal range of motion.         General: No deformity. Normal range of motion.   Lymphadenopathy:     He has no cervical adenopathy.   Neurological: He is alert and oriented to person, place, and time. He exhibits normal muscle tone. Coordination normal.   Skin: Skin is warm, dry, intact, not diaphoretic and not pale.   Psychiatric: His speech is normal and behavior is normal. Judgment and thought content normal.   Nursing note and vitals reviewed.    Results for orders placed or performed in visit on 05/06/22   POCT COVID-19 Rapid Screening   Result Value Ref Range    POC Rapid COVID Positive (A) Negative     Acceptable  Yes        Risk score  2        Assessment:       1. COVID-19          Plan:         COVID-19  -     POCT COVID-19 Rapid Screening    Discussed results with patient and proper quarantine based on CDC guidelines.   Discussed use of OTC medications for symptom control as this is a viral disease.   All ER precautions covered including but not limited to shortness of breath, intractable fever, or chest pain.  Discussed RTC if symptoms worsen, change, or persist.     Patient verbalized understanding and agreed with the plan.     Verónica Gregorio PA-C    Patient Instructions   Your test was POSITIVE for COVID-19 (coronavirus).       Please isolate yourself at home.  You may leave home and/or return to work once the following conditions are met:    If you were not hospitalized and are not severely immunocompromised*:   More than 10 days since symptoms first appeared AND   More than 24 hours fever free without medications AND   Symptoms have improved     If you were hospitalized OR are severely immunocompromised*:   More than 20 days since symptoms first appeared   More than 24 hours fever free without medications   Symptoms have improved    If you had no symptoms but tested positive:   More than 10 days since the date of the first positive test (20 days if severely immunocompromised).   If you develop symptoms, then use the guidelines above.     *Definition of severely immunocompromised:  - Current chemotherapy for cancer  - Untreated HIV with CD4 count less than 200  - Combined primary immunodeficiency disorder  - Prednisone more than 20 mg per day for more than 14 days  - Post-transplant patients    Additional instructions:   Separate yourself from other people and animals in your home.   Call ahead before visiting your doctor.   Wear a facemask when around others.   Cover your coughs and sneezes.   Wash your hands often with soap and water; hand  can be used, too.   Avoid sharing personal household  items.   Wipe down surfaces used daily.   Monitor your symptoms. Seek prompt medical attention if your illness is worsening (e.g., difficulty breathing).    Before seeking care, call your healthcare provider.   If you have a medical emergency and need to call 911, notify the dispatch personnel that you have, or are being evaluated for COVID-19. If possible, put on a facemask before emergency medical services arrive.      Contact Tracing    As one of the next steps, you will receive a call or text from the [place] Department [facility] ([facility]) COVID-19 Tracing Team. See the contact information below so you know not to ignore the health departments call. It is important that you contact them back immediately so they can help.      Contact Tracer Number:  [phone]  Caller ID for most carriers: [place] Dept Health     What is contact tracing?  · Contact tracing is a process that helps identify everyone who has been in close contact with an infected person. Contact tracers let those people know they may have been exposed and guide them on next steps. Confidentiality is important for everyone; no one will be told who may have exposed them to the virus.  · Your involvement is important. The more we know about where and how this virus is spreading, the better chance we have at stopping it from spreading further.  What does exposure mean?  · Exposure means you have been within 6 feet for more than 15 minutes with a person who has or had COVID-19.  What kind of questions do the contact tracers ask?  · A contact tracer will confirm your basic contact information including name, address, phone number, and next of kin, as well as asking about any symptoms you may have had. Theyll also ask you how you think you may have gotten sick, such as places where you may have been exposed to the virus, and people you were with. Those names will never be shared with anyone outside of that call, and will only be used to help trace and  stop the spread of the virus.   I have privacy concerns. How will the state use my information?  · Your privacy about your health is important. All calls are completed using call centers that use the appropriate health privacy protection measures (HIPAA compliance), meaning that your patient information is safe. No one will ever ask you any questions related to immigration status. Your health comes first.   Do I have to participate?  · You do not have to participate, but we strongly encourage you to. Contact tracing can help us catch and control new outbreaks as theyre developing to keep your friends and family safe.   What if I dont hear from anyone?  · If you dont receive a call within 24 hours, you can call the number above right away to inquire about your status. That line is open from 8:00 am - 8:00 p.m., 7 days a week.  Contact tracing saves lives! Together, we have the power to beat this virus and keep our loved ones and neighbors safe.    For more information see CDC link below.      https://www.cdc.gov/coronavirus/2019-ncov/hcp/guidance-prevent-spread.html#precautions        Sources:  [facility], [place] [facility]           Sincerely,     Verónica Gregorio PA-C    PLEASE READ YOUR DISCHARGE INSTRUCTIONS ENTIRELY AS IT CONTAINS IMPORTANT INFORMATION.      Please drink plenty of fluids.    Please get plenty of rest.    Please return here or go to the Emergency Department for any concerns or worsening of condition.    Please take an over the counter antihistamine medication (allegra/Claritin/Zyrtec) of your choice as directed.    Try an over the counter decongestant like Mucinex D or Sudafed. You buy this behind the pharmacy counter    If you do have Hypertension or palpitations, it is safe to take Coricidin HBP for relief of sinus symptoms.    If not allergic, please take over the counter Tylenol (Acetaminophen) and/or Motrin (Ibuprofen) as directed for control of pain and/or  fever.  Please follow up with your primary care doctor or specialist as needed.    Sore throat recommendations: Warm fluids, warm salt water gargles, throat lozenges, tea, honey, soup, rest, hydration.    Use over the counter flonase: one spray each nostril twice daily OR two sprays each nostril once daily.     Sinus rinses DO NOT USE TAP WATER, if you must, water must be a rolling boil for 1 minute, let it cool, then use.  May use distilled water, or over the counter nasal saline rinses.  Vics vapor rub in shower to help open nasal passages.  May use nasal gel to keep passages moisturized.  May use Nasal saline sprays during the day for added relief of congestion.   For those who go to the gym, please do not use the sauna or steam room now to clear sinuses.    If you  smoke, please stop smoking.      Please return or see your primary care doctor if you develop new or worsening symptoms.     Please arrange follow up with your primary medical clinic as soon as possible. You must understand that you've received an Urgent Care treatment only and that you may be released before all of your medical problems are known or treated. You, the patient, will arrange for follow up as instructed. If your symptoms worsen or fail to improve you should go to the Emergency Room.    CDC COVID QUARANTINE     Quarantine  Quarantine if you have been in close contact (within 6 feet of someone for a cumulative total of 15 minutes or more over a 24-hour period) with someone who has COVID-19, unless you have been fully vaccinated or had a positive test within 90 days and are no longer symptomatic.  People who are fully vaccinated and/or had a positive test within 90 days do NOT need to quarantine after contact with someone who had COVID-19 unless they have symptoms. However, fully vaccinated people who have not had a positive test within 90 days, should get tested 3-5 days after their exposure, even if they don't have symptoms and wear a  mask indoors in public for 14 days following exposure or until their test result is negative.    If you have not been vaccinated, and don't have a positive test within 90 days, your quarantine is 10 days without a test, or you can choose to test out of quarantine.   After 5-7 days without symptoms, you can test at that point and you can come out of quarantine on day 8 if negative and still without symptoms.   The risk is that if you test without symptoms on Day 6 (for example) and you are positive, your 10 day quarantine begins on that day, and you turned your 7 day quarantine into 16 days.

## 2022-05-07 ENCOUNTER — NURSE TRIAGE (OUTPATIENT)
Dept: ADMINISTRATIVE | Facility: CLINIC | Age: 52
End: 2022-05-07
Payer: COMMERCIAL

## 2022-05-07 ENCOUNTER — CLINICAL SUPPORT (OUTPATIENT)
Dept: URGENT CARE | Facility: CLINIC | Age: 52
End: 2022-05-07
Payer: COMMERCIAL

## 2022-05-07 ENCOUNTER — PATIENT MESSAGE (OUTPATIENT)
Dept: ADMINISTRATIVE | Facility: CLINIC | Age: 52
End: 2022-05-07
Payer: COMMERCIAL

## 2022-05-07 VITALS
HEIGHT: 67 IN | WEIGHT: 184 LBS | DIASTOLIC BLOOD PRESSURE: 78 MMHG | HEART RATE: 82 BPM | OXYGEN SATURATION: 98 % | TEMPERATURE: 99 F | BODY MASS INDEX: 28.88 KG/M2 | RESPIRATION RATE: 20 BRPM | SYSTOLIC BLOOD PRESSURE: 125 MMHG

## 2022-05-07 DIAGNOSIS — U07.1 COVID: ICD-10-CM

## 2022-05-07 DIAGNOSIS — U07.1 COVID-19: Primary | ICD-10-CM

## 2022-05-07 PROCEDURE — 99213 OFFICE O/P EST LOW 20 MIN: CPT | Mod: S$GLB,,, | Performed by: EMERGENCY MEDICINE

## 2022-05-07 PROCEDURE — 99213 PR OFFICE/OUTPT VISIT, EST, LEVL III, 20-29 MIN: ICD-10-PCS | Mod: S$GLB,,, | Performed by: EMERGENCY MEDICINE

## 2022-05-07 NOTE — PATIENT INSTRUCTIONS
Use over the counter(OTC) antihistamine like claritin or zyrtec daily.  Nasal saline drops: 2-4 drops per nostril 10-15 times a day.   Flonase: 2 sprays per nostril 1-2 times a day for decongestion.  Tylenol or Motrin for fever or pain per label instructions.  Consider use of OTC cough medicine like Robitussin DM.  Warm saltwater gargles to 3 times a day.  Rest and drink plenty of fluids.  Avoid OTC decongestants if you have high blood pressure. This includes multi-cold symptom preparations.    Follow up in 2-3 days with PCP if no improvement or any worsening.   As discussed, you will also get a call from [facility] at the [place] to get a pulse oximeter to monitor your blood oxygen level.  This will be followed by a COVID surveillance team.        You have tested positive for COVID-19 today.      ISOLATION:  If you tested positive and you have no symptoms, you must isolate for 5 days starting on the day of the positive test.     If you tested positive and have symptoms, you must isolate for 5 days starting on the day of the first symptoms,  not the day of the positive test.    This is the most important part: both the CDC and the [facility] emphasize that you do not test out of isolation. You do not need a negative test to come out of quarantine.      After 5 days, if your symptoms have improved and you have not had fever on day 5, you can return to the community on day 6- NO TESTING REQUIRED!   In fact, we do not do retesting if you were positive in the last 90 days.    After your 5 days of isolation are completed, the CDC recommends strict mask use for the first 5 days that you come out of isolation.        For your sick contacts:    No testing recommended for the first 24 hours of symptoms, as there is a high false negative rate in the early part of this illness.     CDC Testing and Quarantine Guidelines for patients with exposure to a known-positive COVID-19 person:  ·     A 'close exposure' is defined as anyone  who has had an exposure (masked or unmasked) to a known COVID -19 positive person          within 6 feet of someone for a cumulative total of 15 minutes or more over a 24-hour period.    ·     vaccinated and/or had a positive test within 90 days do NOT need to quarantine after contact with someone who had COVID-19 unless they have symptoms.           fully vaccinated people who have not had a positive test within 90 days, should get tested 3-5 days after their exposure, even if they don't have symptoms and wear a mask indoors in public for 14 days following exposure or until their test result is negative.    ·     Unvaccinated  and/or NOT had a positive test within 90 days and meet 'close exposure' you are required by CDC guidelines to quarantine for at least 5 days from time of exposure followed by 5 days of strict masking. It is recommended, but not required to test after 5 days, unless you develop symptoms, in which case you should test at that time.    If you do decide to test at 5 days and are asymptomatic, the risk is that if you test without symptoms on Day 5 for example) and you are positive, your 5 day isolation begins on that day, and you turned your 5 day quarantine into 10 days.    If your exposure does not meet the above definition, you can return to your normal daily activities to include social distancing, wearing a mask and frequent handwashing.

## 2022-05-07 NOTE — TELEPHONE ENCOUNTER
[facility] outgoing call - Pt c/o covid positive, o2 sat 92% this morning per pt but currently 95-99%. Slight cough. Afebrile. Covid protocol followed and pt advised to tx at home but if he goes below 93% and cannot recover to call 911 or go to the nearest ER. Education provided on covid, oxygen use, saturation, isolation, tx of symptoms, pulse ox use, deep breathing exercises to stretch lungs. Encounter routed to PCP.  Alert and oriented, Pt agrees with disposition. No further questions at this time. Pt invited to call ooc if any future questions or concerns or worsening symptoms arise.    Reason for Disposition   [1] COVID-19 diagnosed by positive lab test (e.g., PCR, rapid self-test kit) AND [2] mild symptoms (e.g., cough, fever, others) AND [3] no complications or SOB    Additional Information   Negative: SEVERE difficulty breathing (e.g., struggling for each breath, speaks in single words)   Negative: Difficult to awaken or acting confused (e.g., disoriented, slurred speech)   Negative: Bluish (or gray) lips or face now   Negative: Shock suspected (e.g., cold/pale/clammy skin, too weak to stand, low BP, rapid pulse)   Negative: Sounds like a life-threatening emergency to the triager   Negative: SEVERE or constant chest pain or pressure (Exception: mild central chest pain, present only when coughing)   Negative: MODERATE difficulty breathing (e.g., speaks in phrases, SOB even at rest, pulse 100-120)   Negative: [1] Headache AND [2] stiff neck (can't touch chin to chest)   Negative: Chest pain or pressure   Negative: Patient sounds very sick or weak to the triager   Negative: MILD difficulty breathing (e.g., minimal/no SOB at rest, SOB with walking, pulse <100)   Negative: Fever > 103 F (39.4 C)   Negative: [1] Fever > 101 F (38.3 C) AND [2] age > 60 years   Negative: [1] Fever > 100.0 F (37.8 C) AND [2] bedridden (e.g., nursing home patient, CVA, chronic illness, recovering from surgery)   Negative:  HIGH RISK for severe COVID complications (e.g., age > 64 years, obesity with BMI > 25, pregnant, chronic lung disease or other chronic medical condition)  (Exception: Already seen by PCP and no new or worsening symptoms.)   Negative: [1] HIGH RISK patient AND [2] influenza is widespread in the community AND [3] ONE OR MORE respiratory symptoms: cough, sore throat, runny or stuffy nose   Negative: [1] HIGH RISK patient AND [2] influenza exposure within the last 7 days AND [3] ONE OR MORE respiratory symptoms: cough, sore throat, runny or stuffy nose   Negative: Fever present > 3 days (72 hours)   Negative: [1] Fever returns after gone for over 24 hours AND [2] symptoms worse or not improved   Negative: [1] Continuous (nonstop) coughing interferes with work or school AND [2] no improvement using cough treatment per Care Advice   Negative: [1] COVID-19 infection suspected by caller or triager AND [2] mild symptoms (cough, fever, or others) AND [3] negative COVID-19 rapid test   Negative: [1] COVID-19 infection suspected by caller or triager AND [2] mild symptoms (cough, fever, or others) AND [3] has not gotten tested yet   Negative: Cough present > 3 weeks   Negative: [1] COVID-19 diagnosed by positive lab test (e.g., PCR, rapid self-test kit) AND [2] NO symptoms (e.g., cough, fever, others)    Protocols used: CORONAVIRUS (COVID-19) DIAGNOSED OR [id number]

## 2022-05-07 NOTE — PROGRESS NOTES
"Subjective:       Patient ID: Renzo Salgado is a 51 y.o. male.    Vitals:  height is 5' 7" (1.702 m) and weight is 83.5 kg (184 lb). His temperature is 98.7 °F (37.1 °C). His blood pressure is 125/78 and his pulse is 82. His respiration is 20 and oxygen saturation is 98%.     Chief Complaint: oxygen level concerns    Patient presents to clinic today with concern about oxygen level.  He measured his oxygen this morning and 1 time it was 92 but then went up to normal ranges 99 right before he came. He  Was in clinic yesterday and was diagnosed Covid +.  He is concerned about what to do if his oxygen drops again. Inquired about getting an oxygen tank for home use.    URI   This is a new problem. The current episode started yesterday. The problem has been unchanged. There has been no fever. Associated symptoms include coughing. Pertinent negatives include no abdominal pain, chest pain, congestion, diarrhea, dysuria, ear pain, headaches, joint pain, joint swelling, nausea, neck pain, plugged ear sensation, rash, rhinorrhea, sinus pain, sneezing, sore throat, swollen glands, vomiting or wheezing. He has tried nothing for the symptoms. The treatment provided no relief.       Constitution: Positive for fever ( Resolved day before yesterday.  Only had low-grade temp of 99°). Negative for fatigue.   HENT: Positive for postnasal drip. Negative for ear pain, congestion, sinus pain and sore throat.    Neck: Negative for neck pain.   Cardiovascular: Negative for chest pain.   Respiratory: Positive for cough. Negative for wheezing.    Gastrointestinal: Negative for abdominal pain, nausea, vomiting and diarrhea.   Genitourinary: Negative for dysuria.   Skin: Negative for rash.   Allergic/Immunologic: Negative for sneezing.   Neurological: Negative for headaches.       Objective:      Physical Exam   Constitutional: He is oriented to person, place, and time. He appears well-developed. He is cooperative.  Non-toxic appearance. " He does not appear ill. No distress.   HENT:   Head: Normocephalic and atraumatic.   Ears:   Right Ear: Hearing and external ear normal.   Left Ear: Hearing and external ear normal.   Nose: No mucosal edema or nasal deformity. No epistaxis. Right sinus exhibits no maxillary sinus tenderness and no frontal sinus tenderness. Left sinus exhibits no maxillary sinus tenderness and no frontal sinus tenderness.   Mouth/Throat: Uvula is midline and mucous membranes are normal. No trismus in the jaw. Normal dentition. No uvula swelling. No posterior oropharyngeal edema.   Eyes: Conjunctivae and lids are normal. No scleral icterus.   Neck: Trachea normal and phonation normal. Neck supple. No edema present. No erythema present. No neck rigidity present.   Cardiovascular: Normal rate, regular rhythm, normal heart sounds and normal pulses.   Pulmonary/Chest: Effort normal and breath sounds normal. No respiratory distress. He has no decreased breath sounds. He has no wheezes. He has no rhonchi.   Abdominal: Normal appearance.   Musculoskeletal: Normal range of motion.         General: No deformity. Normal range of motion.   Neurological: He is alert and oriented to person, place, and time. He exhibits normal muscle tone. Coordination normal.   Skin: Skin is warm, dry, intact, not diaphoretic and not pale.   Psychiatric: His speech is normal and behavior is normal. Judgment and thought content normal.   Nursing note and vitals reviewed.        Assessment:       1. COVID-19    2. COVID        Reviewed proper use of pulse oximeter with patient.  Encouraged him to take some deep cleansing breaths before measurement and to make sure that he has a good pulse signal while using the pulse oximeter.  I also encouraged him to take note of how he feels.  He denies any shortness of breath or worsening cough.  In fact, the low-grade fever he was running has resolved completely and overall he is feeling better.  Reassurance given that this  likely will be a very mild illness for him.  Patient given ER precautions as well as some over-the-counter medications to use for his postnasal drip and cough  Plan:         COVID-19  -     Cancel: Ambulatory referral/consult to [facility]  -     COVID-19 Surveillance Program  -     pulse oximeter (PULSE OXIMETER) device; by Apply Externally route 2 (two) times a day. Use twice daily at 8 AM and 3 PM and record the value in [employer] as directed.  Dispense: 1 each; Refill: 0    COVID

## 2022-05-18 ENCOUNTER — PATIENT MESSAGE (OUTPATIENT)
Dept: RESEARCH | Facility: HOSPITAL | Age: 52
End: 2022-05-18
Payer: COMMERCIAL

## 2022-06-03 ENCOUNTER — LAB VISIT (OUTPATIENT)
Dept: LAB | Facility: HOSPITAL | Age: 52
End: 2022-06-03
Attending: INTERNAL MEDICINE
Payer: COMMERCIAL

## 2022-06-03 DIAGNOSIS — E78.49 OTHER HYPERLIPIDEMIA: ICD-10-CM

## 2022-06-03 LAB
CHOLEST SERPL-MCNC: 150 MG/DL (ref 120–199)
CHOLEST/HDLC SERPL: 3.6 {RATIO} (ref 2–5)
HDLC SERPL-MCNC: 42 MG/DL (ref 40–75)
HDLC SERPL: 28 % (ref 20–50)
LDLC SERPL CALC-MCNC: 86 MG/DL (ref 63–159)
NONHDLC SERPL-MCNC: 108 MG/DL
TRIGL SERPL-MCNC: 110 MG/DL (ref 30–150)

## 2022-06-03 PROCEDURE — 36415 COLL VENOUS BLD VENIPUNCTURE: CPT | Performed by: INTERNAL MEDICINE

## 2022-06-03 PROCEDURE — 80061 LIPID PANEL: CPT | Performed by: INTERNAL MEDICINE

## 2022-06-15 DIAGNOSIS — I10 ESSENTIAL HYPERTENSION: Primary | ICD-10-CM

## 2022-06-15 DIAGNOSIS — I10 ESSENTIAL HYPERTENSION: ICD-10-CM

## 2022-06-15 DIAGNOSIS — I48.0 PAF (PAROXYSMAL ATRIAL FIBRILLATION): ICD-10-CM

## 2022-06-15 DIAGNOSIS — I48.0 PAF (PAROXYSMAL ATRIAL FIBRILLATION): Primary | ICD-10-CM

## 2022-06-17 ENCOUNTER — OFFICE VISIT (OUTPATIENT)
Dept: CARDIOLOGY | Facility: CLINIC | Age: 52
End: 2022-06-17
Payer: COMMERCIAL

## 2022-06-17 ENCOUNTER — HOSPITAL ENCOUNTER (OUTPATIENT)
Dept: CARDIOLOGY | Facility: HOSPITAL | Age: 52
Discharge: HOME | End: 2022-06-17
Attending: INTERNAL MEDICINE
Payer: COMMERCIAL

## 2022-06-17 VITALS
HEIGHT: 67 IN | OXYGEN SATURATION: 98 % | WEIGHT: 191 LBS | SYSTOLIC BLOOD PRESSURE: 120 MMHG | DIASTOLIC BLOOD PRESSURE: 77 MMHG | HEART RATE: 81 BPM | BODY MASS INDEX: 29.98 KG/M2

## 2022-06-17 DIAGNOSIS — E78.49 OTHER HYPERLIPIDEMIA: ICD-10-CM

## 2022-06-17 DIAGNOSIS — I10 ESSENTIAL HYPERTENSION: Primary | ICD-10-CM

## 2022-06-17 DIAGNOSIS — G47.33 OSA (OBSTRUCTIVE SLEEP APNEA): ICD-10-CM

## 2022-06-17 DIAGNOSIS — I48.0 PAROXYSMAL ATRIAL FIBRILLATION: ICD-10-CM

## 2022-06-17 DIAGNOSIS — I48.0 PAF (PAROXYSMAL ATRIAL FIBRILLATION): ICD-10-CM

## 2022-06-17 DIAGNOSIS — I10 ESSENTIAL HYPERTENSION: ICD-10-CM

## 2022-06-17 PROCEDURE — 99999 PR PBB SHADOW E&M-EST. PATIENT-LVL III: CPT | Mod: PBBFAC,,, | Performed by: INTERNAL MEDICINE

## 2022-06-17 PROCEDURE — 99214 OFFICE O/P EST MOD 30 MIN: CPT | Mod: S$GLB,,, | Performed by: INTERNAL MEDICINE

## 2022-06-17 PROCEDURE — 3078F DIAST BP <80 MM HG: CPT | Mod: CPTII,S$GLB,, | Performed by: INTERNAL MEDICINE

## 2022-06-17 PROCEDURE — 1160F RVW MEDS BY RX/DR IN RCRD: CPT | Mod: CPTII,S$GLB,, | Performed by: INTERNAL MEDICINE

## 2022-06-17 PROCEDURE — 4010F PR ACE/ARB THEARPY RXD/TAKEN: ICD-10-PCS | Mod: CPTII,S$GLB,, | Performed by: INTERNAL MEDICINE

## 2022-06-17 PROCEDURE — 93005 ELECTROCARDIOGRAM TRACING: CPT

## 2022-06-17 PROCEDURE — 3074F PR MOST RECENT SYSTOLIC BLOOD PRESSURE < 130 MM HG: ICD-10-PCS | Mod: CPTII,S$GLB,, | Performed by: INTERNAL MEDICINE

## 2022-06-17 PROCEDURE — 3008F PR BODY MASS INDEX (BMI) DOCUMENTED: ICD-10-PCS | Mod: CPTII,S$GLB,, | Performed by: INTERNAL MEDICINE

## 2022-06-17 PROCEDURE — 3008F BODY MASS INDEX DOCD: CPT | Mod: CPTII,S$GLB,, | Performed by: INTERNAL MEDICINE

## 2022-06-17 PROCEDURE — 1159F MED LIST DOCD IN RCRD: CPT | Mod: CPTII,S$GLB,, | Performed by: INTERNAL MEDICINE

## 2022-06-17 PROCEDURE — 93010 ELECTROCARDIOGRAM REPORT: CPT | Mod: ,,, | Performed by: INTERNAL MEDICINE

## 2022-06-17 PROCEDURE — 93010 EKG 12-LEAD: ICD-10-PCS | Mod: ,,, | Performed by: INTERNAL MEDICINE

## 2022-06-17 PROCEDURE — 1159F PR MEDICATION LIST DOCUMENTED IN MEDICAL RECORD: ICD-10-PCS | Mod: CPTII,S$GLB,, | Performed by: INTERNAL MEDICINE

## 2022-06-17 PROCEDURE — 4010F ACE/ARB THERAPY RXD/TAKEN: CPT | Mod: CPTII,S$GLB,, | Performed by: INTERNAL MEDICINE

## 2022-06-17 PROCEDURE — 3078F PR MOST RECENT DIASTOLIC BLOOD PRESSURE < 80 MM HG: ICD-10-PCS | Mod: CPTII,S$GLB,, | Performed by: INTERNAL MEDICINE

## 2022-06-17 PROCEDURE — 99214 PR OFFICE/OUTPT VISIT, EST, LEVL IV, 30-39 MIN: ICD-10-PCS | Mod: S$GLB,,, | Performed by: INTERNAL MEDICINE

## 2022-06-17 PROCEDURE — 99999 PR PBB SHADOW E&M-EST. PATIENT-LVL III: ICD-10-PCS | Mod: PBBFAC,,, | Performed by: INTERNAL MEDICINE

## 2022-06-17 PROCEDURE — 3074F SYST BP LT 130 MM HG: CPT | Mod: CPTII,S$GLB,, | Performed by: INTERNAL MEDICINE

## 2022-06-17 PROCEDURE — 1160F PR REVIEW ALL MEDS BY PRESCRIBER/CLIN PHARMACIST DOCUMENTED: ICD-10-PCS | Mod: CPTII,S$GLB,, | Performed by: INTERNAL MEDICINE

## 2022-06-17 RX ORDER — VALSARTAN 80 MG/1
80 TABLET ORAL DAILY
Qty: 90 TABLET | Refills: 3 | Status: SHIPPED | OUTPATIENT
Start: 2022-06-17 | End: 2023-03-31 | Stop reason: SDUPTHER

## 2022-06-17 RX ORDER — DILTIAZEM HYDROCHLORIDE 120 MG/1
120 CAPSULE, COATED, EXTENDED RELEASE ORAL DAILY
Qty: 90 CAPSULE | Refills: 3 | Status: SHIPPED | OUTPATIENT
Start: 2022-06-17 | End: 2023-03-31 | Stop reason: SDUPTHER

## 2022-06-17 RX ORDER — METOPROLOL SUCCINATE 100 MG/1
100 TABLET, EXTENDED RELEASE ORAL 2 TIMES DAILY
Qty: 180 TABLET | Refills: 3 | Status: SHIPPED | OUTPATIENT
Start: 2022-06-17 | End: 2023-03-31 | Stop reason: SDUPTHER

## 2022-06-17 NOTE — PROGRESS NOTES
Subjective:   Patient ID:  Renzo Salgado is a 51 y.o. male who presents for cardiac consult of No chief complaint on file.      Follow-up  Pertinent negatives include no chest pain.   Atrial Fibrillation  Symptoms are negative for chest pain, palpitations and shortness of breath. Past medical history includes atrial fibrillation.   Hypertension  Pertinent negatives include no chest pain, palpitations or shortness of breath.     The patient came in today for cardiac consult of No chief complaint on file.    Renzo Salgado is a 51 y.o. male  pt with PAFib on Eliquis, HTN, HLD,  KIKO, renal stones presents for follow up CV evaluation.     9/10/18  He recently had left ureteroscopy with laser lithotripsy and stone basket extraction and Cystoscopy with placement of left double-J ureteral stent 9/4/18 per Dr. Baker. Prior to surgery, the pt had Afib with RVR. Cardiology was consulted. Pt received IV Cardizem and was placed on a IV Cardizem drip. He underwent surgery and was admitted for uncontrolled Afib on IV Cardizem. Cardizem 120 mg po added to his regimen.     His cardiologist is Dr. Ned Parker with [facility]. Last Friday he saw Dr. Parker. He may get a DCCV. He had Afib since 2011.  He was also on Multaq but has been in Afib. He is here for a second opinion. Pt had stress test in 2011 which was negative.   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     10/18/18  Pt had RICARDO/DCCV last week which was successful to convert to NSR. Unfortunately he is back in Afib, rate controlled at 80 bpm. Walks everyday without issues. He feels well today. Will refer to sleep apnea. Pt does snore, nocturia, fatigue at times. ECG- AFib HR 80s    4/30/19  Pt had sleep study which was abnormal, needs CPAP. He has started CPAP since Jan, breathing improved, less apneic episodes. No bleeding issues with Eliquis. BP mildly elevated, pt rushed getting here and mildly stressed. Discussed will do repeat DCCV.     8/1/19  Pt feels  overall well, is walking well. No CP. Does not feel palpitations. No further hematuria, no bleeding on Eliquis. He had an allergy in July to lisinopril and changed to Diovan.Is compliant with CPAP, doing well. Discussed DCCV if symptoms are worse.      12/16/20  He does not feel anymore AFib, he is using CPAP more. BP well controlled - 120/70s, HR - 70s. He does stationary bike and HR 70s-80s.    6/16/21  Last visit virtual, today BP and Hr well controlled. Pt remains in Afib but rate controlled. He is using CPAP 7-8 hours. HE still does 45 min stationary bike.     12/17/21  Lipids in Sept with elevated Tgs 202. BP and Hr stable today. Weight 189 lbs. He needs to get COVID booster. He still does bike 45 min.     6/17/22  Recent lipids 6/2022 improved TGs 110, Tchol 150, LDL 86. BP and HR well controlled. He had COVID 19 about 6 weeks ago, in in Afib now feels well. He has more allergies, uses Flonase.   ECG - Afib, V rate 86    Patient feels no chest pain, no sob, no leg swelling, no PND, no palpitation, no dizziness, no syncope, no CNS symptoms.     Patient has fairly good exercise tolerance. Works with  company.     Patient is compliant with medications.    2D ECHO  4/2016  CONCLUSIONS     1 - Concentric remodeling.     2 - Normal left ventricular systolic function (EF 55-60%).     3 - Normal left ventricular diastolic function.     4 - Normal right ventricular systolic function .     5 - The estimated PA systolic pressure is 31 mmHg.     6 - Mild tricuspid regurgitation.       RICARDO   CONCLUSIONS     1 - No visualized thrombus in the left atrium with spontaneous echo contrast.     2 - No visualized thrombus in the left atrial appendage.     3 - Normal left ventricular systolic function (EF 55-60%).     4 - Indeterminate LV diastolic function.     5 - Trivial to mild mitral regurgitation.     6 - Trivial tricuspid regurgitation.     This document has been electronically    SIGNED BY: Abundio Alvarado MD On:  10/05/2018 11:02            Past Medical History:   Diagnosis Date    Atrial fibrillation     Hypertension             Past Surgical History:   Procedure Laterality Date    CARDIOVERSION N/A 10/5/2018    Procedure: CARDIOVERSION;  Surgeon: Abundio Alvarado MD;  Location: [facility] CATH LAB;  Service: Cardiology;  Laterality: N/A;  Anesthesia notified 9/24 gcd    CYSTOSCOPY W/ URETERAL STENT PLACEMENT Left 9/4/2018    Procedure: CYSTOSCOPY, WITH URETERAL STENT INSERTION;  Surgeon: Ricky Baker IV, MD;  Location: [facility] OR;  Service: Urology;  Laterality: Left;    LASER LITHOTRIPSY Left 9/4/2018    Procedure: LITHOTRIPSY, USING LASER;  Surgeon: Ricky Baker IV, MD;  Location: [facility] OR;  Service: Urology;  Laterality: Left;    UPPER GASTROINTESTINAL ENDOSCOPY         Social History     Tobacco Use    Smoking status: Never Smoker    Smokeless tobacco: Never Used   Substance Use Topics    Alcohol use: No    Drug use: No       Family History   Problem Relation Age of Onset    Hypertension Mother     Melanoma Neg Hx     Psoriasis Neg Hx     Lupus Neg Hx        Patient's Medications   New Prescriptions    No medications on file   Previous Medications    APIXABAN (ELIQUIS) 5 MG TAB    Take 1 tablet (5 mg total) by mouth 2 (two) times daily.    DILTIAZEM (CARDIZEM CD) 120 MG CP24    Take 1 capsule (120 mg total) by mouth once daily.    EPINEPHRINE (EPIPEN) 0.3 MG/0.3 ML ATIN    Inject 0.3 mLs (0.3 mg total) into the muscle as needed.    METOPROLOL SUCCINATE (TOPROL-XL) 100 MG 24 HR TABLET    Take 1 tablet (100 mg total) by mouth 2 (two) times daily.    PULSE OXIMETER (PULSE OXIMETER) DEVICE    Use twice daily at 8 AM and 3 PM and record the value in [facility] as directed.    VALSARTAN (DIOVAN) 80 MG TABLET    Take 1 tablet (80 mg total) by mouth once daily.   Modified Medications    No medications on file   Discontinued Medications    No medications on file       Review of Systems   Constitutional: Negative.   "  HENT: Negative.    Eyes: Negative.    Respiratory: Negative.  Negative for shortness of breath.    Cardiovascular: Negative.  Negative for chest pain and palpitations.   Gastrointestinal: Negative.    Genitourinary: Negative for hematuria.   Musculoskeletal: Negative.    Skin: Negative.    Neurological: Negative.    Endo/Heme/Allergies: Negative.    Psychiatric/Behavioral: Negative.    All 12 systems otherwise negative.      Wt Readings from Last 3 Encounters:   06/17/22 86.6 kg (191 lb)   05/07/22 83.5 kg (184 lb)   05/06/22 83.5 kg (184 lb)     Temp Readings from Last 3 Encounters:   05/07/22 98.7 °F (37.1 °C)   05/06/22 98.3 °F (36.8 °C) (Tympanic)   05/27/21 98 °F (36.7 °C)     BP Readings from Last 3 Encounters:   06/17/22 120/77   05/07/22 125/78   05/06/22 135/84     Pulse Readings from Last 3 Encounters:   06/17/22 81   05/07/22 82   05/06/22 101       /77   Pulse 81   Ht 5' 7" (1.702 m)   Wt 86.6 kg (191 lb)   SpO2 98%   BMI 29.91 kg/m²     Objective:   Physical Exam  Vitals and nursing note reviewed.   Constitutional:       General: He is not in acute distress.     Appearance: He is well-developed. He is not diaphoretic.   HENT:      Head: Normocephalic and atraumatic.      Nose: Nose normal.      Mouth/Throat:      Pharynx: No oropharyngeal exudate.   Eyes:      General: No scleral icterus.        Right eye: No discharge.         Left eye: No discharge.      Conjunctiva/sclera: Conjunctivae normal.   Neck:      Thyroid: No thyromegaly.      Vascular: No JVD.   Cardiovascular:      Rate and Rhythm: Normal rate. Rhythm irregular.      Heart sounds: S1 normal and S2 normal. No murmur heard.    No friction rub. No gallop. No S3 or S4 sounds.   Pulmonary:      Effort: Pulmonary effort is normal. No respiratory distress.      Breath sounds: Normal breath sounds. No stridor. No wheezing or rales.   Chest:      Chest wall: No tenderness.   Abdominal:      General: Bowel sounds are normal. There is no " distension.      Palpations: Abdomen is soft. There is no mass.      Tenderness: There is no abdominal tenderness. There is no rebound.   Genitourinary:     Comments: Deferred  Musculoskeletal:         General: No tenderness or deformity. Normal range of motion.      Cervical back: Normal range of motion and neck supple.   Lymphadenopathy:      Cervical: No cervical adenopathy.   Skin:     General: Skin is warm and dry.      Coloration: Skin is not pale.      Findings: No erythema or rash.   Neurological:      Mental Status: He is alert and oriented to person, place, and time.      Motor: No abnormal muscle tone.      Coordination: Coordination normal.   Psychiatric:         Behavior: Behavior normal.         Thought Content: Thought content normal.         Judgment: Judgment normal.         Lab Results   Component Value Date     08/21/2020    K 4.6 08/21/2020     08/21/2020    CO2 27 08/21/2020    BUN 11 08/21/2020    CREATININE 1.0 08/21/2020     08/21/2020    AST 20 08/21/2020    ALT 33 08/21/2020    ALBUMIN 4.2 09/04/2020    PROT 7.8 08/21/2020    BILITOT 0.5 08/21/2020    WBC 7.10 08/21/2020    HGB 15.9 08/21/2020    HCT 48.7 08/21/2020    MCV 93 08/21/2020     08/21/2020    TSH 1.852 08/21/2020    CHOL 150 06/03/2022    HDL 42 06/03/2022    LDLCALC 86.0 06/03/2022    TRIG 110 06/03/2022    BNP 85 04/01/2016     Assessment:      1. Essential hypertension    2. PAF (paroxysmal atrial fibrillation)    3. Other hyperlipidemia    4. KIKO (obstructive sleep apnea)        Plan:   1.PAFib s/p RICARDO/DCCV 10/2018 in Afib  - stress negative in past  - cont BB and CCB   - cont Eliquis for AC  - will rate control now, if symptoms occur will refer to EP for ablation vs antiarrythmic vs repeat DCCV - defer for now    2. HTN  - cont meds    3. Sleep apnea   - cont CPAP     4. Overweight, BMI 29, goal < 170 lbs  - cont weight loss    5. HLD with elevated Tgs  - Recent lipids 6/2022 improved TGs 110,  Tchol 150, LDL 86.   - cont fish oils  - cont to monitor  - cont low wil diet,  not on meds now    Thank you for allowing me to participate in this patient's care. Please do not hesitate to contact me with any questions or concerns. Consult note has been forwarded to the referral physician.

## 2022-07-27 ENCOUNTER — PATIENT MESSAGE (OUTPATIENT)
Dept: ADMINISTRATIVE | Facility: HOSPITAL | Age: 52
End: 2022-07-27
Payer: COMMERCIAL

## 2022-08-15 ENCOUNTER — PATIENT OUTREACH (OUTPATIENT)
Dept: ADMINISTRATIVE | Facility: HOSPITAL | Age: 52
End: 2022-08-15
Payer: COMMERCIAL

## 2022-08-15 NOTE — PROGRESS NOTES
Called patient to schedule overdue PCP appt, Patient answered and stated he will schedule through MyChart.

## 2022-10-20 ENCOUNTER — PATIENT MESSAGE (OUTPATIENT)
Dept: ADMINISTRATIVE | Facility: HOSPITAL | Age: 52
End: 2022-10-20
Payer: COMMERCIAL

## 2022-11-09 ENCOUNTER — PATIENT MESSAGE (OUTPATIENT)
Dept: RESEARCH | Facility: HOSPITAL | Age: 52
End: 2022-11-09
Payer: COMMERCIAL

## 2023-03-01 DIAGNOSIS — I10 ESSENTIAL HYPERTENSION: ICD-10-CM

## 2023-03-07 DIAGNOSIS — I48.0 PAF (PAROXYSMAL ATRIAL FIBRILLATION): Primary | ICD-10-CM

## 2023-03-10 ENCOUNTER — OFFICE VISIT (OUTPATIENT)
Dept: INTERNAL MEDICINE | Facility: CLINIC | Age: 53
End: 2023-03-10
Payer: COMMERCIAL

## 2023-03-10 ENCOUNTER — PATIENT MESSAGE (OUTPATIENT)
Dept: INTERNAL MEDICINE | Facility: CLINIC | Age: 53
End: 2023-03-10

## 2023-03-10 ENCOUNTER — TELEPHONE (OUTPATIENT)
Dept: INTERNAL MEDICINE | Facility: CLINIC | Age: 53
End: 2023-03-10
Payer: COMMERCIAL

## 2023-03-10 VITALS
OXYGEN SATURATION: 98 % | TEMPERATURE: 98 F | HEIGHT: 67 IN | DIASTOLIC BLOOD PRESSURE: 86 MMHG | SYSTOLIC BLOOD PRESSURE: 120 MMHG | BODY MASS INDEX: 31.11 KG/M2 | WEIGHT: 198.19 LBS | HEART RATE: 94 BPM

## 2023-03-10 DIAGNOSIS — I10 ESSENTIAL HYPERTENSION: ICD-10-CM

## 2023-03-10 DIAGNOSIS — E78.49 OTHER HYPERLIPIDEMIA: ICD-10-CM

## 2023-03-10 DIAGNOSIS — I48.0 PAF (PAROXYSMAL ATRIAL FIBRILLATION): ICD-10-CM

## 2023-03-10 DIAGNOSIS — Z12.11 COLON CANCER SCREENING: ICD-10-CM

## 2023-03-10 DIAGNOSIS — N48.1 BALANITIS: ICD-10-CM

## 2023-03-10 DIAGNOSIS — E66.9 OBESITY (BMI 30-39.9): ICD-10-CM

## 2023-03-10 DIAGNOSIS — E66.3 OVERWEIGHT WITH BODY MASS INDEX (BMI) 25.0-29.9: ICD-10-CM

## 2023-03-10 DIAGNOSIS — Z00.00 ROUTINE GENERAL MEDICAL EXAMINATION AT A HEALTH CARE FACILITY: Primary | ICD-10-CM

## 2023-03-10 DIAGNOSIS — G47.33 OSA (OBSTRUCTIVE SLEEP APNEA): ICD-10-CM

## 2023-03-10 PROBLEM — U07.1 COVID: Status: RESOLVED | Noted: 2022-05-07 | Resolved: 2023-03-10

## 2023-03-10 PROCEDURE — 99213 OFFICE O/P EST LOW 20 MIN: CPT | Mod: 25,S$GLB,, | Performed by: INTERNAL MEDICINE

## 2023-03-10 PROCEDURE — 99213 PR OFFICE/OUTPT VISIT, EST, LEVL III, 20-29 MIN: ICD-10-PCS | Mod: 25,S$GLB,, | Performed by: INTERNAL MEDICINE

## 2023-03-10 PROCEDURE — 99999 PR PBB SHADOW E&M-EST. PATIENT-LVL V: ICD-10-PCS | Mod: PBBFAC,,, | Performed by: INTERNAL MEDICINE

## 2023-03-10 PROCEDURE — 1159F MED LIST DOCD IN RCRD: CPT | Mod: CPTII,S$GLB,, | Performed by: INTERNAL MEDICINE

## 2023-03-10 PROCEDURE — 4010F PR ACE/ARB THEARPY RXD/TAKEN: ICD-10-PCS | Mod: CPTII,S$GLB,, | Performed by: INTERNAL MEDICINE

## 2023-03-10 PROCEDURE — 1159F PR MEDICATION LIST DOCUMENTED IN MEDICAL RECORD: ICD-10-PCS | Mod: CPTII,S$GLB,, | Performed by: INTERNAL MEDICINE

## 2023-03-10 PROCEDURE — 3079F PR MOST RECENT DIASTOLIC BLOOD PRESSURE 80-89 MM HG: ICD-10-PCS | Mod: CPTII,S$GLB,, | Performed by: INTERNAL MEDICINE

## 2023-03-10 PROCEDURE — 99999 PR PBB SHADOW E&M-EST. PATIENT-LVL V: CPT | Mod: PBBFAC,,, | Performed by: INTERNAL MEDICINE

## 2023-03-10 PROCEDURE — 3008F BODY MASS INDEX DOCD: CPT | Mod: CPTII,S$GLB,, | Performed by: INTERNAL MEDICINE

## 2023-03-10 PROCEDURE — 3008F PR BODY MASS INDEX (BMI) DOCUMENTED: ICD-10-PCS | Mod: CPTII,S$GLB,, | Performed by: INTERNAL MEDICINE

## 2023-03-10 PROCEDURE — 99396 PR PREVENTIVE VISIT,EST,40-64: ICD-10-PCS | Mod: S$GLB,,, | Performed by: INTERNAL MEDICINE

## 2023-03-10 PROCEDURE — 3074F PR MOST RECENT SYSTOLIC BLOOD PRESSURE < 130 MM HG: ICD-10-PCS | Mod: CPTII,S$GLB,, | Performed by: INTERNAL MEDICINE

## 2023-03-10 PROCEDURE — 3079F DIAST BP 80-89 MM HG: CPT | Mod: CPTII,S$GLB,, | Performed by: INTERNAL MEDICINE

## 2023-03-10 PROCEDURE — 99396 PREV VISIT EST AGE 40-64: CPT | Mod: S$GLB,,, | Performed by: INTERNAL MEDICINE

## 2023-03-10 PROCEDURE — 3074F SYST BP LT 130 MM HG: CPT | Mod: CPTII,S$GLB,, | Performed by: INTERNAL MEDICINE

## 2023-03-10 PROCEDURE — 4010F ACE/ARB THERAPY RXD/TAKEN: CPT | Mod: CPTII,S$GLB,, | Performed by: INTERNAL MEDICINE

## 2023-03-10 RX ORDER — CLOTRIMAZOLE 1 %
CREAM (GRAM) TOPICAL 2 TIMES DAILY
Qty: 45 G | Refills: 0 | Status: SHIPPED | OUTPATIENT
Start: 2023-03-10

## 2023-03-10 NOTE — PROGRESS NOTES
Subjective:      Patient ID: Renzo Salgado is a 52 y.o. male.    Chief Complaint: Annual Exam  Here for annual exam.   HPI      52 y.o. with  Patient Active Problem List   Diagnosis    Essential hypertension    Renal stone    Ureteral stone    KIKO (obstructive sleep apnea)    PAF (paroxysmal atrial fibrillation)    Lip swelling    Other hyperlipidemia    Routine general medical examination at a health care facility    Obesity (BMI 30-39.9)     Past Medical History:   Diagnosis Date    Atrial fibrillation     COVID 5/7/2022    Hypertension     Other hyperlipidemia 6/16/2021    Sleep apnea        Here today for annual prev exam.  Compliant with meds without significant side effects. Energy and appetite are good.     Pt also c/o flat red flaky spot on head of penis. Noticed on month ago. Moisturizer helps the flanks.  Itching no pain.  No discharge from the penis.  No testicular pain.  Denies trauma to the area. No new sex partners.       Past Surgical History:   Procedure Laterality Date    CARDIOVERSION N/A 10/5/2018    Procedure: CARDIOVERSION;  Surgeon: Abundio Alvarado MD;  Location: [facility] CATH LAB;  Service: Cardiology;  Laterality: N/A;  Anesthesia notified 9/24 gcd    CYSTOSCOPY W/ URETERAL STENT PLACEMENT Left 9/4/2018    Procedure: CYSTOSCOPY, WITH URETERAL STENT INSERTION;  Surgeon: Ricky Baker IV, MD;  Location: [facility] OR;  Service: Urology;  Laterality: Left;    LASER LITHOTRIPSY Left 9/4/2018    Procedure: LITHOTRIPSY, USING LASER;  Surgeon: Ricky Baker IV, MD;  Location: [facility] OR;  Service: Urology;  Laterality: Left;    UPPER GASTROINTESTINAL ENDOSCOPY       Social History     Socioeconomic History    Marital status:    Tobacco Use    Smoking status: Never    Smokeless tobacco: Never   Substance and Sexual Activity    Alcohol use: No    Drug use: No     family history includes Hypertension in his mother.  Review of Systems   Constitutional:  Negative for chills and fever.   HENT:   "Negative for ear pain and sore throat.    Respiratory:  Negative for cough.    Cardiovascular:  Negative for chest pain.   Gastrointestinal:  Negative for abdominal pain and blood in stool.   Genitourinary:  Negative for dysuria and hematuria.   Neurological:  Negative for seizures and syncope.   Objective:   /86 (BP Location: Right arm, Patient Position: Sitting, BP Method: Large (Manual))   Pulse 94   Temp 98 °F (36.7 °C) (Tympanic)   Ht 5' 7" (1.702 m)   Wt 89.9 kg (198 lb 3.1 oz)   SpO2 98%   BMI 31.04 kg/m²     Physical Exam  Constitutional:       General: He is not in acute distress.     Appearance: He is well-developed.   HENT:      Head: Normocephalic and atraumatic.   Eyes:      Extraocular Movements: Extraocular movements intact.   Neck:      Thyroid: No thyromegaly.   Cardiovascular:      Rate and Rhythm: Normal rate and regular rhythm.   Pulmonary:      Breath sounds: Normal breath sounds. No wheezing or rales.   Abdominal:      General: Bowel sounds are normal.      Palpations: Abdomen is soft.      Tenderness: There is no abdominal tenderness.   Musculoskeletal:         General: No swelling.      Cervical back: Neck supple. No rigidity.   Lymphadenopathy:      Cervical: No cervical adenopathy.   Skin:     General: Skin is warm and dry.   Neurological:      Mental Status: He is alert and oriented to person, place, and time.   Psychiatric:         Behavior: Behavior normal.       Lab Results   Component Value Date    WBC 7.10 08/21/2020    HGB 15.9 08/21/2020    HGB 15.3 09/05/2018    HGB 16.1 08/27/2018    HCT 48.7 08/21/2020    MCV 93 08/21/2020    MCV 90 09/05/2018    MCV 94 08/27/2018     08/21/2020    CHOL 150 06/03/2022    TRIG 110 06/03/2022    HDL 42 06/03/2022    LDLCALC 86.0 06/03/2022    LDLCALC 76.6 09/17/2021    LDLCALC 78.0 01/18/2016    ALT 33 08/21/2020    AST 20 08/21/2020     08/21/2020    K 4.6 08/21/2020     08/21/2020    CO2 27 08/21/2020    BUN 11 " 08/21/2020    CREATININE 1.0 08/21/2020    CREATININE 1.0 09/05/2018    CREATININE 1.0 08/27/2018    TSH 1.852 08/21/2020    TSH 1.807 05/22/2019    TSH 2.151 01/18/2016    PSA 0.77 01/18/2016     08/21/2020    CIGQUDIB89IH 23 (L) 01/18/2016    TOTALTESTOST 241 (L) 08/21/2020    BNP 85 04/01/2016      2 irregularly-shaped confluent erythematous macules to head of the penis.  No penile discharge.  No tenderness.    The 10-year ASCVD risk score (Michael MARTÍNEZ, et al., 2019) is: 4.4%    Values used to calculate the score:      Age: 52 years      Sex: Male      Is Non- : No      Diabetic: No      Tobacco smoker: No      Systolic Blood Pressure: 120 mmHg      Is BP treated: Yes      HDL Cholesterol: 42 mg/dL      Total Cholesterol: 150 mg/dL     Assessment:     1. Routine general medical examination at a health care facility    2. Other hyperlipidemia    3. PAF (paroxysmal atrial fibrillation)    4. Essential hypertension    5. Colon cancer screening    6. Obesity (BMI 30-39.9)    7. Balanitis    8. Overweight with body mass index (BMI) 25.0-29.9    9. KIKO (obstructive sleep apnea)      Plan:   1. Routine general medical examination at a health care facility  Overview:  Heart healthy diet and regular exercise.  Health maintenance reviewed    Orders:  -     Comprehensive Metabolic Panel; Future; Expected date: 03/10/2023  -     CBC Auto Differential; Future; Expected date: 03/10/2023  -     TSH; Future; Expected date: 03/10/2023  -     Hemoglobin A1C; Future; Expected date: 03/10/2023  -     PSA, Screening; Future; Expected date: 03/10/2023    2. Other hyperlipidemia    3. PAF (paroxysmal atrial fibrillation)  Overview:  Rate controlled.  Continue current medications and recommendations as per Cardiology      4. Essential hypertension  Overview:  Controlled.  Continue current medication      5. Colon cancer screening  -     Cologuard Screening (Multitarget Stool DNA); Future; Expected date:  03/10/2023    6. Obesity (BMI 30-39.9)  Overview:  Diet and exercise    Orders:  -     Ambulatory referral/consult to [facility]; Future; Expected date: 03/17/2023    7. Balanitis  -     clotrimazole (LOTRIMIN) 1 % cream; Apply topically 2 (two) times daily.  Dispense: 45 g; Refill: 0    8. Overweight with body mass index (BMI) 25.0-29.9  Overview:  Diet and exercise      9. KIKO (obstructive sleep apnea)        Patient Instructions   Check with your pharmacy regarding new shingles vaccine.      Future Appointments   Date Time Provider Department Center   3/16/2023  8:40 AM LUIGI Suarez [facility]   3/31/2023 10:[facility]   3/31/2023 10:40 AM Abundio Alvarado MD [facility] CARDIO [facility]   9/15/2023  2:40 PM Fadi Damico MD [facility] [place]       Lab Frequency Next Occurrence   Comprehensive Metabolic Panel Once 03/01/2023       Follow up in about 6 months (around 9/10/2023), or if symptoms worsen or fail to improve.

## 2023-03-10 NOTE — TELEPHONE ENCOUNTER
----- Message from Kindra Florez sent at 3/10/2023  3:13 PM CST -----  Please call the patient to schedule an appointment

## 2023-03-16 ENCOUNTER — OFFICE VISIT (OUTPATIENT)
Dept: PRIMARY CARE CLINIC | Facility: CLINIC | Age: 53
End: 2023-03-16
Payer: COMMERCIAL

## 2023-03-16 VITALS
HEIGHT: 67 IN | OXYGEN SATURATION: 98 % | TEMPERATURE: 99 F | BODY MASS INDEX: 31.04 KG/M2 | DIASTOLIC BLOOD PRESSURE: 80 MMHG | SYSTOLIC BLOOD PRESSURE: 120 MMHG | WEIGHT: 197.75 LBS | HEART RATE: 62 BPM

## 2023-03-16 DIAGNOSIS — E78.49 OTHER HYPERLIPIDEMIA: ICD-10-CM

## 2023-03-16 DIAGNOSIS — I10 PRIMARY HYPERTENSION: Primary | ICD-10-CM

## 2023-03-16 DIAGNOSIS — E66.9 OBESITY (BMI 30-39.9): ICD-10-CM

## 2023-03-16 DIAGNOSIS — I10 ESSENTIAL HYPERTENSION: ICD-10-CM

## 2023-03-16 PROCEDURE — 99999 PR PBB SHADOW E&M-EST. PATIENT-LVL III: CPT | Mod: PBBFAC,,, | Performed by: PHYSICIAN ASSISTANT

## 2023-03-16 PROCEDURE — 99214 OFFICE O/P EST MOD 30 MIN: CPT | Mod: S$GLB,,, | Performed by: PHYSICIAN ASSISTANT

## 2023-03-16 PROCEDURE — 3008F PR BODY MASS INDEX (BMI) DOCUMENTED: ICD-10-PCS | Mod: CPTII,S$GLB,, | Performed by: PHYSICIAN ASSISTANT

## 2023-03-16 PROCEDURE — 1159F MED LIST DOCD IN RCRD: CPT | Mod: CPTII,S$GLB,, | Performed by: PHYSICIAN ASSISTANT

## 2023-03-16 PROCEDURE — 4010F ACE/ARB THERAPY RXD/TAKEN: CPT | Mod: CPTII,S$GLB,, | Performed by: PHYSICIAN ASSISTANT

## 2023-03-16 PROCEDURE — 3079F PR MOST RECENT DIASTOLIC BLOOD PRESSURE 80-89 MM HG: ICD-10-PCS | Mod: CPTII,S$GLB,, | Performed by: PHYSICIAN ASSISTANT

## 2023-03-16 PROCEDURE — 99999 PR PBB SHADOW E&M-EST. PATIENT-LVL III: ICD-10-PCS | Mod: PBBFAC,,, | Performed by: PHYSICIAN ASSISTANT

## 2023-03-16 PROCEDURE — 3074F PR MOST RECENT SYSTOLIC BLOOD PRESSURE < 130 MM HG: ICD-10-PCS | Mod: CPTII,S$GLB,, | Performed by: PHYSICIAN ASSISTANT

## 2023-03-16 PROCEDURE — 3008F BODY MASS INDEX DOCD: CPT | Mod: CPTII,S$GLB,, | Performed by: PHYSICIAN ASSISTANT

## 2023-03-16 PROCEDURE — 1159F PR MEDICATION LIST DOCUMENTED IN MEDICAL RECORD: ICD-10-PCS | Mod: CPTII,S$GLB,, | Performed by: PHYSICIAN ASSISTANT

## 2023-03-16 PROCEDURE — 99214 PR OFFICE/OUTPT VISIT, EST, LEVL IV, 30-39 MIN: ICD-10-PCS | Mod: S$GLB,,, | Performed by: PHYSICIAN ASSISTANT

## 2023-03-16 PROCEDURE — 4010F PR ACE/ARB THEARPY RXD/TAKEN: ICD-10-PCS | Mod: CPTII,S$GLB,, | Performed by: PHYSICIAN ASSISTANT

## 2023-03-16 PROCEDURE — 3079F DIAST BP 80-89 MM HG: CPT | Mod: CPTII,S$GLB,, | Performed by: PHYSICIAN ASSISTANT

## 2023-03-16 PROCEDURE — 3074F SYST BP LT 130 MM HG: CPT | Mod: CPTII,S$GLB,, | Performed by: PHYSICIAN ASSISTANT

## 2023-03-17 NOTE — PROGRESS NOTES
Subjective:       Patient ID: Renzo Salgado is a 52 y.o. male.    HPI    Lifestyle related medical issues:     Patient comes in today to discuss health     He has hypertension hld and kapil     Past Medical History:   Diagnosis Date    Atrial fibrillation     COVID 5/7/2022    Hypertension     Other hyperlipidemia 6/16/2021    Sleep apnea      Social Determinants of Health with Concerns     Alcohol Use: Not on file   Financial Resource Strain: Not on file   Food Insecurity: Not on file   Transportation Needs: Not on file   Physical Activity: Not on file   Stress: Not on file   Social Connections: Not on file   Housing Stability: Not on file     Past Surgical History:   Procedure Laterality Date    CARDIOVERSION N/A 10/5/2018    Procedure: CARDIOVERSION;  Surgeon: Abundio Alvarado MD;  Location: [facility] CATH LAB;  Service: Cardiology;  Laterality: N/A;  Anesthesia notified 9/24 gcd    CYSTOSCOPY W/ URETERAL STENT PLACEMENT Left 9/4/2018    Procedure: CYSTOSCOPY, WITH URETERAL STENT INSERTION;  Surgeon: Ricky Baker IV, MD;  Location: [facility] OR;  Service: Urology;  Laterality: Left;    LASER LITHOTRIPSY Left 9/4/2018    Procedure: LITHOTRIPSY, USING LASER;  Surgeon: Ricky Baker IV, MD;  Location: [facility] OR;  Service: Urology;  Laterality: Left;    UPPER GASTROINTESTINAL ENDOSCOPY       Family History   Problem Relation Age of Onset    Hypertension Mother     Melanoma Neg Hx     Psoriasis Neg Hx     Lupus Neg Hx          Physical activity:  good   5 days a week, both cardio and resistance   Diet:     Rice up to 3 times a day   Sometimes breads  Meats, chicken, seafood   Sleep:  no issues   Stress: no issues   Overall wellbeing: good   Social support: good   Barriers to goals: none perceived         Weight goal  -175    Wt Readings from Last 3 Encounters:   03/16/23 89.7 kg (197 lb 12 oz)   03/10/23 89.9 kg (198 lb 3.1 oz)   06/17/22 86.6 kg (191 lb)         Current Outpatient Medications   Medication Instructions  "   apixaban (ELIQUIS) 5 mg, Oral, 2 times daily    clotrimazole (LOTRIMIN) 1 % cream Topical (Top), 2 times daily    diltiaZEM (CARDIZEM CD) 120 mg, Oral, Daily    metoprolol succinate (TOPROL-XL) 100 mg, Oral, 2 times daily    valsartan (DIOVAN) 80 mg, Oral, Daily            Review of Systems   Constitutional:  Negative for fatigue, fever and unexpected weight change.   HENT:  Negative for sore throat and trouble swallowing.    Eyes:  Negative for photophobia, redness and visual disturbance.   Respiratory:  Negative for cough and shortness of breath.    Cardiovascular:  Negative for chest pain.   Gastrointestinal:  Negative for abdominal pain.   Hematological:  Negative for adenopathy. Does not bruise/bleed easily.   All other systems reviewed and are negative.    Objective:   /80 (BP Location: Right arm, Patient Position: Sitting, BP Method: Medium (Manual))   Pulse 62   Temp 98.5 °F (36.9 °C) (Tympanic)   Ht 5' 7" (1.702 m)   Wt 89.7 kg (197 lb 12 oz)   SpO2 98%   BMI 30.97 kg/m²      Physical Exam  Constitutional:       General: He is not in acute distress.     Appearance: Normal appearance. He is well-developed. He is obese.   HENT:      Head: Normocephalic and atraumatic.   Eyes:      Pupils: Pupils are equal, round, and reactive to light.   Cardiovascular:      Rate and Rhythm: Normal rate.   Pulmonary:      Effort: Pulmonary effort is normal.   Neurological:      Mental Status: He is alert and oriented to person, place, and time.   Psychiatric:         Behavior: Behavior normal.         Lab Results   Component Value Date    WBC 7.10 08/21/2020    HGB 15.9 08/21/2020    HCT 48.7 08/21/2020     08/21/2020    CHOL 150 06/03/2022    TRIG 110 06/03/2022    HDL 42 06/03/2022    ALT 33 08/21/2020    AST 20 08/21/2020     08/21/2020    K 4.6 08/21/2020     08/21/2020    CREATININE 1.0 08/21/2020    BUN 11 08/21/2020    CO2 27 08/21/2020    TSH 1.852 08/21/2020    PSA 0.77 01/18/2016 "       Assessment:       1. Primary hypertension    2. Obesity (BMI 30-39.9)    3. Essential hypertension    4. Other hyperlipidemia          Plan:   Primary hypertension    Obesity (BMI 30-39.9)  -     Ambulatory referral/consult to [facility]    Essential hypertension    Other hyperlipidemia               Physical Activity  - continue    Diet  -  less white rice   More complex carbs, non starchy veggies    Avoid pastries for breakfast.  Choose eggs, yogurt, , etc    Time spent: 35 minutes in face to face and with review prior and after of chart notes from specialists, in regards to diagnosis, prognosis, review of lab and test results, benefits of treatment as well as management of disease, counseling of patient and coordination of care between various health care providers .    5/19/2023

## 2023-03-22 ENCOUNTER — PATIENT MESSAGE (OUTPATIENT)
Dept: ADMINISTRATIVE | Facility: HOSPITAL | Age: 53
End: 2023-03-22
Payer: COMMERCIAL

## 2023-03-24 LAB — NONINV COLON CA DNA+OCC BLD SCRN STL QL: NEGATIVE

## 2023-03-31 ENCOUNTER — HOSPITAL ENCOUNTER (OUTPATIENT)
Dept: CARDIOLOGY | Facility: HOSPITAL | Age: 53
Discharge: HOME | End: 2023-03-31
Attending: INTERNAL MEDICINE
Payer: COMMERCIAL

## 2023-03-31 ENCOUNTER — OFFICE VISIT (OUTPATIENT)
Dept: CARDIOLOGY | Facility: CLINIC | Age: 53
End: 2023-03-31
Payer: COMMERCIAL

## 2023-03-31 VITALS
SYSTOLIC BLOOD PRESSURE: 122 MMHG | WEIGHT: 196.63 LBS | DIASTOLIC BLOOD PRESSURE: 80 MMHG | BODY MASS INDEX: 30.8 KG/M2 | HEART RATE: 84 BPM | OXYGEN SATURATION: 97 %

## 2023-03-31 DIAGNOSIS — E78.49 OTHER HYPERLIPIDEMIA: ICD-10-CM

## 2023-03-31 DIAGNOSIS — I48.0 PAF (PAROXYSMAL ATRIAL FIBRILLATION): ICD-10-CM

## 2023-03-31 DIAGNOSIS — G47.33 OSA (OBSTRUCTIVE SLEEP APNEA): ICD-10-CM

## 2023-03-31 DIAGNOSIS — I10 ESSENTIAL HYPERTENSION: ICD-10-CM

## 2023-03-31 DIAGNOSIS — I48.0 PAF (PAROXYSMAL ATRIAL FIBRILLATION): Primary | ICD-10-CM

## 2023-03-31 DIAGNOSIS — I48.0 PAROXYSMAL ATRIAL FIBRILLATION: ICD-10-CM

## 2023-03-31 PROCEDURE — 3079F DIAST BP 80-89 MM HG: CPT | Mod: CPTII,S$GLB,, | Performed by: INTERNAL MEDICINE

## 2023-03-31 PROCEDURE — 3008F PR BODY MASS INDEX (BMI) DOCUMENTED: ICD-10-PCS | Mod: CPTII,S$GLB,, | Performed by: INTERNAL MEDICINE

## 2023-03-31 PROCEDURE — 4010F ACE/ARB THERAPY RXD/TAKEN: CPT | Mod: CPTII,S$GLB,, | Performed by: INTERNAL MEDICINE

## 2023-03-31 PROCEDURE — 99999 PR PBB SHADOW E&M-EST. PATIENT-LVL III: CPT | Mod: PBBFAC,,, | Performed by: INTERNAL MEDICINE

## 2023-03-31 PROCEDURE — 3074F PR MOST RECENT SYSTOLIC BLOOD PRESSURE < 130 MM HG: ICD-10-PCS | Mod: CPTII,S$GLB,, | Performed by: INTERNAL MEDICINE

## 2023-03-31 PROCEDURE — 3008F BODY MASS INDEX DOCD: CPT | Mod: CPTII,S$GLB,, | Performed by: INTERNAL MEDICINE

## 2023-03-31 PROCEDURE — 1159F MED LIST DOCD IN RCRD: CPT | Mod: CPTII,S$GLB,, | Performed by: INTERNAL MEDICINE

## 2023-03-31 PROCEDURE — 99999 PR PBB SHADOW E&M-EST. PATIENT-LVL III: ICD-10-PCS | Mod: PBBFAC,,, | Performed by: INTERNAL MEDICINE

## 2023-03-31 PROCEDURE — 99214 OFFICE O/P EST MOD 30 MIN: CPT | Mod: S$GLB,,, | Performed by: INTERNAL MEDICINE

## 2023-03-31 PROCEDURE — 3079F PR MOST RECENT DIASTOLIC BLOOD PRESSURE 80-89 MM HG: ICD-10-PCS | Mod: CPTII,S$GLB,, | Performed by: INTERNAL MEDICINE

## 2023-03-31 PROCEDURE — 4010F PR ACE/ARB THEARPY RXD/TAKEN: ICD-10-PCS | Mod: CPTII,S$GLB,, | Performed by: INTERNAL MEDICINE

## 2023-03-31 PROCEDURE — 93010 ELECTROCARDIOGRAM REPORT: CPT | Mod: ,,, | Performed by: INTERNAL MEDICINE

## 2023-03-31 PROCEDURE — 93010 EKG 12-LEAD: ICD-10-PCS | Mod: ,,, | Performed by: INTERNAL MEDICINE

## 2023-03-31 PROCEDURE — 1160F PR REVIEW ALL MEDS BY PRESCRIBER/CLIN PHARMACIST DOCUMENTED: ICD-10-PCS | Mod: CPTII,S$GLB,, | Performed by: INTERNAL MEDICINE

## 2023-03-31 PROCEDURE — 1159F PR MEDICATION LIST DOCUMENTED IN MEDICAL RECORD: ICD-10-PCS | Mod: CPTII,S$GLB,, | Performed by: INTERNAL MEDICINE

## 2023-03-31 PROCEDURE — 3074F SYST BP LT 130 MM HG: CPT | Mod: CPTII,S$GLB,, | Performed by: INTERNAL MEDICINE

## 2023-03-31 PROCEDURE — 1160F RVW MEDS BY RX/DR IN RCRD: CPT | Mod: CPTII,S$GLB,, | Performed by: INTERNAL MEDICINE

## 2023-03-31 PROCEDURE — 93005 ELECTROCARDIOGRAM TRACING: CPT

## 2023-03-31 PROCEDURE — 99214 PR OFFICE/OUTPT VISIT, EST, LEVL IV, 30-39 MIN: ICD-10-PCS | Mod: S$GLB,,, | Performed by: INTERNAL MEDICINE

## 2023-03-31 RX ORDER — METOPROLOL SUCCINATE 100 MG/1
100 TABLET, EXTENDED RELEASE ORAL 2 TIMES DAILY
Qty: 180 TABLET | Refills: 3 | Status: SHIPPED | OUTPATIENT
Start: 2023-03-31 | End: 2024-04-01 | Stop reason: SDUPTHER

## 2023-03-31 RX ORDER — DILTIAZEM HYDROCHLORIDE 120 MG/1
120 CAPSULE, COATED, EXTENDED RELEASE ORAL DAILY
Qty: 90 CAPSULE | Refills: 3 | Status: SHIPPED | OUTPATIENT
Start: 2023-03-31 | End: 2024-04-01 | Stop reason: SDUPTHER

## 2023-03-31 RX ORDER — VALSARTAN 80 MG/1
80 TABLET ORAL DAILY
Qty: 90 TABLET | Refills: 3 | Status: SHIPPED | OUTPATIENT
Start: 2023-03-31 | End: 2024-04-01 | Stop reason: SDUPTHER

## 2023-03-31 NOTE — PROGRESS NOTES
Subjective:   Patient ID:  Renzo Salgado is a 52 y.o. male who presents for cardiac consult of No chief complaint on file.      The patient came in today for cardiac consult of No chief complaint on file.    Renzo Salgado is a 52 y.o. male  pt with PAFib on Eliquis, HTN, HLD,  KIKO, renal stones presents for follow up CV evaluation.     9/10/18  He recently had left ureteroscopy with laser lithotripsy and stone basket extraction and Cystoscopy with placement of left double-J ureteral stent 9/4/18 per Dr. Baker. Prior to surgery, the pt had Afib with RVR. Cardiology was consulted. Pt received IV Cardizem and was placed on a IV Cardizem drip. He underwent surgery and was admitted for uncontrolled Afib on IV Cardizem. Cardizem 120 mg po added to his regimen.     His cardiologist is Dr. Ned Parker with [facility]. Last Friday he saw Dr. Parker. He may get a DCCV. He had Afib since 2011.  He was also on Multaq but has been in Afib. He is here for a second opinion. Pt had stress test in 2011 which was negative.   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     10/18/18  Pt had RICARDO/DCCV last week which was successful to convert to NSR. Unfortunately he is back in Afib, rate controlled at 80 bpm. Walks everyday without issues. He feels well today. Will refer to sleep apnea. Pt does snore, nocturia, fatigue at times. ECG- AFib HR 80s    4/30/19  Pt had sleep study which was abnormal, needs CPAP. He has started CPAP since Jan, breathing improved, less apneic episodes. No bleeding issues with Eliquis. BP mildly elevated, pt rushed getting here and mildly stressed. Discussed will do repeat DCCV.     8/1/19  Pt feels overall well, is walking well. No CP. Does not feel palpitations. No further hematuria, no bleeding on Eliquis. He had an allergy in July to lisinopril and changed to Diovan.Is compliant with CPAP, doing well. Discussed DCCV if symptoms are worse.      12/16/20  He does not feel anymore AFib, he is using  CPAP more. BP well controlled - 120/70s, HR - 70s. He does stationary bike and HR 70s-80s.    6/16/21  Last visit virtual, today BP and Hr well controlled. Pt remains in Afib but rate controlled. He is using CPAP 7-8 hours. HE still does 45 min stationary bike.     12/17/21  Lipids in Sept with elevated Tgs 202. BP and Hr stable today. Weight 189 lbs. He needs to get COVID booster. He still does bike 45 min.     6/17/22  Recent lipids 6/2022 improved TGs 110, Tchol 150, LDL 86. BP and HR well controlled. He had COVID 19 about 6 weeks ago, in in Afib now feels well. He has more allergies, uses Flonase.   ECG - Afib, V rate 86    3/31/23  Bp and HR well controlled. BMI 30 - 196 lbs. He had COVID last May but nothing since then. He has enrolled in gym, does 40 min cardio and 30 -40 min weights.   ECG - AFib, V rate 72    Patient feels no chest pain, no sob, no leg swelling, no PND, no palpitation, no dizziness, no syncope, no CNS symptoms.     Patient has fairly good exercise tolerance. Works with  company.     Patient is compliant with medications.    2D ECHO  4/2016  CONCLUSIONS     1 - Concentric remodeling.     2 - Normal left ventricular systolic function (EF 55-60%).     3 - Normal left ventricular diastolic function.     4 - Normal right ventricular systolic function .     5 - The estimated PA systolic pressure is 31 mmHg.     6 - Mild tricuspid regurgitation.       RICARDO   CONCLUSIONS     1 - No visualized thrombus in the left atrium with spontaneous echo contrast.     2 - No visualized thrombus in the left atrial appendage.     3 - Normal left ventricular systolic function (EF 55-60%).     4 - Indeterminate LV diastolic function.     5 - Trivial to mild mitral regurgitation.     6 - Trivial tricuspid regurgitation.     This document has been electronically    SIGNED BY: Abundio Alvarado MD On: 10/05/2018 11:02            Past Medical History:   Diagnosis Date    Atrial fibrillation     Hypertension              Past Surgical History:   Procedure Laterality Date    CARDIOVERSION N/A 10/5/2018    Procedure: CARDIOVERSION;  Surgeon: Abundio Alvarado MD;  Location: [facility] CATH LAB;  Service: Cardiology;  Laterality: N/A;  Anesthesia notified 9/24 gcd    CYSTOSCOPY W/ URETERAL STENT PLACEMENT Left 9/4/2018    Procedure: CYSTOSCOPY, WITH URETERAL STENT INSERTION;  Surgeon: Ricky Baker IV, MD;  Location: [facility] OR;  Service: Urology;  Laterality: Left;    LASER LITHOTRIPSY Left 9/4/2018    Procedure: LITHOTRIPSY, USING LASER;  Surgeon: Ricky Baker IV, MD;  Location: [facility] OR;  Service: Urology;  Laterality: Left;    UPPER GASTROINTESTINAL ENDOSCOPY         Social History     Tobacco Use    Smoking status: Never    Smokeless tobacco: Never   Substance Use Topics    Alcohol use: No    Drug use: No       Family History   Problem Relation Age of Onset    Hypertension Mother     Melanoma Neg Hx     Psoriasis Neg Hx     Lupus Neg Hx        Patient's Medications   New Prescriptions    No medications on file   Previous Medications    APIXABAN (ELIQUIS) 5 MG TAB    Take 1 tablet (5 mg total) by mouth 2 (two) times daily.    CLOTRIMAZOLE (LOTRIMIN) 1 % CREAM    Apply topically 2 (two) times daily.    DILTIAZEM (CARDIZEM CD) 120 MG CP24    Take 1 capsule (120 mg total) by mouth once daily.    METOPROLOL SUCCINATE (TOPROL-XL) 100 MG 24 HR TABLET    Take 1 tablet (100 mg total) by mouth 2 (two) times daily.    VALSARTAN (DIOVAN) 80 MG TABLET    Take 1 tablet (80 mg total) by mouth once daily.   Modified Medications    No medications on file   Discontinued Medications    No medications on file       Review of Systems   Constitutional: Negative.    HENT: Negative.     Eyes: Negative.    Respiratory: Negative.  Negative for shortness of breath.    Cardiovascular: Negative.  Negative for chest pain and palpitations.   Gastrointestinal: Negative.    Genitourinary:  Negative for hematuria.   Musculoskeletal: Negative.    Skin: Negative.     Neurological: Negative.    Endo/Heme/Allergies: Negative.    Psychiatric/Behavioral: Negative.     All 12 systems otherwise negative.    Wt Readings from Last 3 Encounters:   03/31/23 89.2 kg (196 lb 10.4 oz)   03/16/23 89.7 kg (197 lb 12 oz)   03/10/23 89.9 kg (198 lb 3.1 oz)     Temp Readings from Last 3 Encounters:   03/16/23 98.5 °F (36.9 °C) (Tympanic)   03/10/23 98 °F (36.7 °C) (Tympanic)   05/07/22 98.7 °F (37.1 °C)     BP Readings from Last 3 Encounters:   03/31/23 122/80   03/16/23 120/80   03/10/23 120/86     Pulse Readings from Last 3 Encounters:   03/31/23 84   03/16/23 62   03/10/23 94       /80   Pulse 84   Wt 89.2 kg (196 lb 10.4 oz)   SpO2 97%   BMI 30.80 kg/m²     Objective:   Physical Exam  Vitals and nursing note reviewed.   Constitutional:       General: He is not in acute distress.     Appearance: He is well-developed. He is not diaphoretic.   HENT:      Head: Normocephalic and atraumatic.      Nose: Nose normal.      Mouth/Throat:      Pharynx: No oropharyngeal exudate.   Eyes:      General: No scleral icterus.        Right eye: No discharge.         Left eye: No discharge.      Conjunctiva/sclera: Conjunctivae normal.   Neck:      Thyroid: No thyromegaly.      Vascular: No JVD.   Cardiovascular:      Rate and Rhythm: Normal rate. Rhythm irregular.      Heart sounds: S1 normal and S2 normal. No murmur heard.    No friction rub. No gallop. No S3 or S4 sounds.   Pulmonary:      Effort: Pulmonary effort is normal. No respiratory distress.      Breath sounds: Normal breath sounds. No stridor. No wheezing or rales.   Chest:      Chest wall: No tenderness.   Abdominal:      General: Bowel sounds are normal. There is no distension.      Palpations: Abdomen is soft. There is no mass.      Tenderness: There is no abdominal tenderness. There is no rebound.   Genitourinary:     Comments: Deferred  Musculoskeletal:         General: No tenderness or deformity. Normal range of motion.       Cervical back: Normal range of motion and neck supple.   Lymphadenopathy:      Cervical: No cervical adenopathy.   Skin:     General: Skin is warm and dry.      Coloration: Skin is not pale.      Findings: No erythema or rash.   Neurological:      Mental Status: He is alert and oriented to person, place, and time.      Motor: No abnormal muscle tone.      Coordination: Coordination normal.   Psychiatric:         Behavior: Behavior normal.         Thought Content: Thought content normal.         Judgment: Judgment normal.       Lab Results   Component Value Date     08/21/2020    K 4.6 08/21/2020     08/21/2020    CO2 27 08/21/2020    BUN 11 08/21/2020    CREATININE 1.0 08/21/2020     08/21/2020    AST 20 08/21/2020    ALT 33 08/21/2020    ALBUMIN 4.2 09/04/2020    PROT 7.8 08/21/2020    BILITOT 0.5 08/21/2020    WBC 7.10 08/21/2020    HGB 15.9 08/21/2020    HCT 48.7 08/21/2020    MCV 93 08/21/2020     08/21/2020    TSH 1.852 08/21/2020    CHOL 150 06/03/2022    HDL 42 06/03/2022    LDLCALC 86.0 06/03/2022    TRIG 110 06/03/2022    BNP 85 04/01/2016     Assessment:      1. PAF (paroxysmal atrial fibrillation)    2. Essential hypertension    3. Other hyperlipidemia    4. KIKO (obstructive sleep apnea)    5. Paroxysmal atrial fibrillation          Plan:   1.PAFib s/p RICARDO/DCCV 10/2018 in Afib  - stress negative in past  - cont BB and CCB   - cont Eliquis for AC  - will rate control now, if symptoms occur will refer to EP for ablation vs antiarrythmic vs repeat DCCV - defer for now    2. HTN  - cont meds    3. Sleep apnea   - cont CPAP     4. Overweight, BMI 29, goal < 170 lbs--> BMI 30 - 196 lbs.   - cont weight loss    5. HLD with elevated Tgs  - cont fish oils  - cont to monitor  - cont low wil diet,  not on meds now    Thank you for allowing me to participate in this patient's care. Please do not hesitate to contact me with any questions or concerns. Consult note has been forwarded to the  referral physician.

## 2023-06-12 PROBLEM — Z00.00 ROUTINE GENERAL MEDICAL EXAMINATION AT A HEALTH CARE FACILITY: Status: RESOLVED | Noted: 2023-03-10 | Resolved: 2023-06-12

## 2023-07-07 ENCOUNTER — PATIENT MESSAGE (OUTPATIENT)
Dept: INFECTIOUS DISEASES | Facility: CLINIC | Age: 53
End: 2023-07-07
Payer: COMMERCIAL

## 2023-07-14 ENCOUNTER — PATIENT MESSAGE (OUTPATIENT)
Dept: INTERNAL MEDICINE | Facility: CLINIC | Age: 53
End: 2023-07-14
Payer: COMMERCIAL

## 2023-07-14 ENCOUNTER — LAB VISIT (OUTPATIENT)
Dept: LAB | Facility: HOSPITAL | Age: 53
End: 2023-07-14
Attending: INTERNAL MEDICINE
Payer: COMMERCIAL

## 2023-07-14 DIAGNOSIS — Z00.00 ROUTINE GENERAL MEDICAL EXAMINATION AT A HEALTH CARE FACILITY: ICD-10-CM

## 2023-07-14 LAB
ALBUMIN SERPL BCP-MCNC: 4 G/DL (ref 3.5–5.2)
ALP SERPL-CCNC: 64 U/L (ref 55–135)
ALT SERPL W/O P-5'-P-CCNC: 31 U/L (ref 10–44)
ANION GAP SERPL CALC-SCNC: 11 MMOL/L (ref 8–16)
AST SERPL-CCNC: 23 U/L (ref 10–40)
BILIRUB SERPL-MCNC: 0.4 MG/DL (ref 0.1–1)
BUN SERPL-MCNC: 15 MG/DL (ref 6–20)
CALCIUM SERPL-MCNC: 9.8 MG/DL (ref 8.7–10.5)
CHLORIDE SERPL-SCNC: 104 MMOL/L (ref 95–110)
CO2 SERPL-SCNC: 24 MMOL/L (ref 23–29)
CREAT SERPL-MCNC: 0.9 MG/DL (ref 0.5–1.4)
EST. GFR  (NO RACE VARIABLE): >60 ML/MIN/1.73 M^2
ESTIMATED AVG GLUCOSE: 143 MG/DL (ref 68–131)
GLUCOSE SERPL-MCNC: 163 MG/DL (ref 70–110)
HBA1C MFR BLD: 6.6 % (ref 4–5.6)
POTASSIUM SERPL-SCNC: 4.4 MMOL/L (ref 3.5–5.1)
PROT SERPL-MCNC: 7.6 G/DL (ref 6–8.4)
SODIUM SERPL-SCNC: 139 MMOL/L (ref 136–145)
TSH SERPL DL<=0.005 MIU/L-ACNC: 1.29 UIU/ML (ref 0.4–4)

## 2023-07-14 PROCEDURE — 83036 HEMOGLOBIN GLYCOSYLATED A1C: CPT | Performed by: INTERNAL MEDICINE

## 2023-07-14 PROCEDURE — 36415 COLL VENOUS BLD VENIPUNCTURE: CPT | Performed by: INTERNAL MEDICINE

## 2023-07-14 PROCEDURE — 84443 ASSAY THYROID STIM HORMONE: CPT | Performed by: INTERNAL MEDICINE

## 2023-07-14 PROCEDURE — 80053 COMPREHEN METABOLIC PANEL: CPT | Performed by: INTERNAL MEDICINE

## 2023-07-19 ENCOUNTER — PATIENT MESSAGE (OUTPATIENT)
Dept: INTERNAL MEDICINE | Facility: CLINIC | Age: 53
End: 2023-07-19
Payer: COMMERCIAL

## 2023-12-02 PROBLEM — E78.5 HYPERLIPIDEMIA ASSOCIATED WITH TYPE 2 DIABETES MELLITUS: Status: ACTIVE | Noted: 2021-06-16

## 2023-12-02 PROBLEM — E11.69 HYPERLIPIDEMIA ASSOCIATED WITH TYPE 2 DIABETES MELLITUS: Status: ACTIVE | Noted: 2021-06-16

## 2023-12-03 NOTE — PROGRESS NOTES
"Subjective:      Patient ID: Renzo Salgado is a 53 y.o. male.    Chief Complaint: Annual Exam    HPI    Here today for management of mult med problems as outlined below.  Compliant with meds without significant side effects. Energy and appetite good.     Pt also c/o rash behind right ear intermittently for years.  Occasional itching.  Can also occur in his scalp.  Over-the-counter cortisone is somewhat helpful.    Review of Systems   Constitutional:  Negative for chills and fever.   HENT:  Negative for ear pain and sore throat.    Respiratory:  Negative for cough.    Cardiovascular:  Negative for chest pain.   Gastrointestinal:  Negative for abdominal pain and blood in stool.   Genitourinary:  Negative for dysuria and hematuria.   Neurological:  Negative for seizures and syncope.     Objective:   /78 (BP Location: Left arm, Patient Position: Sitting, BP Method: Large (Manual))   Pulse 67   Temp 96.9 °F (36.1 °C) (Tympanic)   Ht 5' 7.01" (1.702 m)   Wt 90.6 kg (199 lb 11.8 oz)   SpO2 98%   BMI 31.28 kg/m²     Physical Exam  Constitutional:       General: He is not in acute distress.     Appearance: He is well-developed.   HENT:      Head: Normocephalic and atraumatic.      Right Ear: External ear normal.      Left Ear: External ear normal.   Eyes:      Pupils: Pupils are equal, round, and reactive to light.   Neck:      Thyroid: No thyromegaly.   Cardiovascular:      Rate and Rhythm: Normal rate. Rhythm irregular.   Pulmonary:      Breath sounds: Normal breath sounds. No wheezing or rales.   Abdominal:      General: Bowel sounds are normal.      Palpations: Abdomen is soft.      Tenderness: There is no abdominal tenderness.   Musculoskeletal:      Cervical back: Neck supple.   Lymphadenopathy:      Cervical: No cervical adenopathy.   Skin:     General: Skin is warm and dry.   Neurological:      Mental Status: He is alert and oriented to person, place, and time.   Psychiatric:         Behavior: " Behavior normal.     Erythematous red raised dry lesion behind right ear.  Faint scaling.  No tenderness. No d/c.     Lab Results   Component Value Date    WBC 7.10 08/21/2020    HGB 15.9 08/21/2020    HGB 15.3 09/05/2018    HGB 16.1 08/27/2018    HCT 48.7 08/21/2020    MCV 93 08/21/2020    MCV 90 09/05/2018    MCV 94 08/27/2018     08/21/2020    CHOL 150 06/03/2022    TRIG 110 06/03/2022    HDL 42 06/03/2022    LDLCALC 86.0 06/03/2022    LDLCALC 76.6 09/17/2021    LDLCALC 78.0 01/18/2016    ALT 31 07/14/2023    AST 23 07/14/2023     07/14/2023    K 4.4 07/14/2023    CALCIUM 9.8 07/14/2023     07/14/2023    CO2 24 07/14/2023    BUN 15 07/14/2023    CREATININE 0.9 07/14/2023    CREATININE 1.0 08/21/2020    CREATININE 1.0 09/05/2018    EGFRNORACEVR >60.0 07/14/2023    TSH 1.293 07/14/2023    TSH 1.852 08/21/2020    TSH 1.807 05/22/2019    PSA 0.77 01/18/2016     (H) 07/14/2023    HGBA1C 6.6 (H) 07/14/2023    [id number] 23 (L) 01/18/2016    TOTALTESTOST 241 (L) 08/21/2020    BNP 85 04/01/2016          The 10-year ASCVD risk score (Michael MARTÍNEZ, et al., 2019) is: 4.8%    Values used to calculate the score:      Age: 53 years      Sex: Male      Is Non- : No      Diabetic: No      Tobacco smoker: No      Systolic Blood Pressure: 118 mmHg      Is BP treated: Yes      HDL Cholesterol: 42 mg/dL      Total Cholesterol: 150 mg/dL     FIB-4 Calculation: 0 at 12/6/2023  7:48 AM     FIB-4 below 1.30 is considered as low-risk for advanced fibrosis  FIB-4 over 2.67 is considered as high-risk for advanced fibrosis  FIB-4 values between 1.30 and 2.67 are considered as intermediate-risk of advanced fibrosis for ages 36-64.     For ages > 64 the cut-off for low-risk goes to < 2.  This is a screening tool and clinical judgement should be used in the interpretation of these results.      Assessment:     1. Hypertension associated with diabetes    2. KIKO (obstructive sleep apnea)    3. PAF  (paroxysmal atrial fibrillation)    4. Type 2 diabetes mellitus with other specified complication, without long-term current use of insulin    5. Hyperlipidemia associated with type 2 diabetes mellitus    6. Preventative health care    7. Dermatitis      Plan:   1. Hypertension associated with diabetes  Overview:  Controlled.  Continue current medication      2. KIKO (obstructive sleep apnea)  Overview:  Continue cpap      3. PAF (paroxysmal atrial fibrillation)  Overview:  Rate controlled.  Continue current medications and recommendations as per Cardiology      4. Type 2 diabetes mellitus with other specified complication, without long-term current use of insulin  Overview:  Diagnosed with A1c of 6.6 in 2023.      Orders:  -     Ambulatory referral/consult to Diabetes Education; Future; Expected date: 12/13/2023    5. Hyperlipidemia associated with type 2 diabetes mellitus  Overview:        Assessment & Plan:  New.  Start Crestor.    Orders:  -     rosuvastatin (CRESTOR) 10 MG tablet; Take 1 tablet (10 mg total) by mouth once daily.  Dispense: 90 tablet; Refill: 1  -     ALT (SGPT); Future; Expected date: 03/06/2024  -     Lipid Panel; Future; Expected date: 03/05/2024    6. Preventative health care  -     Lipid Panel; Future; Expected date: 06/03/2024  -     Hemoglobin A1C; Future; Expected date: 06/03/2024  -     Comprehensive Metabolic Panel; Future; Expected date: 06/03/2024  -     CBC Auto Differential; Future; Expected date: 06/03/2024  -     PSA, Screening; Future; Expected date: 06/03/2024  -     TSH; Future; Expected date: 06/03/2024    7. Dermatitis  -     triamcinolone acetonide 0.1% (KENALOG) 0.1 % cream; Apply topically 2 (two) times daily.  Dispense: 45 g; Refill: 0  -     Ambulatory referral/consult to Dermatology; Future; Expected date: 12/13/2023        Patient Instructions   Check with your pharmacy regarding new shingles, covid, flu vaccine.     Notify your eye doctor that you have diabetes and  need a diabetic retinal exam.    Future Appointments   Date Time Provider Department Center   3/6/2024  7:40 AM LABORATORY, [facility] LAB [facility]   4/5/2024 11:20 AM Abundio Alvarado MD [facility] CARDIO [facility]   6/6/2024  7:40 AM Fadi Damico MD [facility]           Follow up in about 6 months (around 6/6/2024), or if symptoms worsen or fail to improve.

## 2023-12-06 ENCOUNTER — OFFICE VISIT (OUTPATIENT)
Dept: INTERNAL MEDICINE | Facility: CLINIC | Age: 53
End: 2023-12-06
Payer: COMMERCIAL

## 2023-12-06 ENCOUNTER — OFFICE VISIT (OUTPATIENT)
Dept: DERMATOLOGY | Facility: CLINIC | Age: 53
End: 2023-12-06
Payer: COMMERCIAL

## 2023-12-06 VITALS
BODY MASS INDEX: 31.35 KG/M2 | HEART RATE: 67 BPM | HEIGHT: 67 IN | WEIGHT: 199.75 LBS | TEMPERATURE: 97 F | OXYGEN SATURATION: 98 % | DIASTOLIC BLOOD PRESSURE: 78 MMHG | SYSTOLIC BLOOD PRESSURE: 118 MMHG

## 2023-12-06 DIAGNOSIS — I15.2 HYPERTENSION ASSOCIATED WITH DIABETES: Primary | ICD-10-CM

## 2023-12-06 DIAGNOSIS — L30.9 DERMATITIS: ICD-10-CM

## 2023-12-06 DIAGNOSIS — L30.9 DERMATITIS: Primary | ICD-10-CM

## 2023-12-06 DIAGNOSIS — E11.59 HYPERTENSION ASSOCIATED WITH DIABETES: Primary | ICD-10-CM

## 2023-12-06 DIAGNOSIS — G47.33 OSA (OBSTRUCTIVE SLEEP APNEA): ICD-10-CM

## 2023-12-06 DIAGNOSIS — E78.5 HYPERLIPIDEMIA ASSOCIATED WITH TYPE 2 DIABETES MELLITUS: ICD-10-CM

## 2023-12-06 DIAGNOSIS — I48.0 PAF (PAROXYSMAL ATRIAL FIBRILLATION): ICD-10-CM

## 2023-12-06 DIAGNOSIS — E11.69 TYPE 2 DIABETES MELLITUS WITH OTHER SPECIFIED COMPLICATION, WITHOUT LONG-TERM CURRENT USE OF INSULIN: ICD-10-CM

## 2023-12-06 DIAGNOSIS — Z00.00 PREVENTATIVE HEALTH CARE: ICD-10-CM

## 2023-12-06 DIAGNOSIS — E11.69 HYPERLIPIDEMIA ASSOCIATED WITH TYPE 2 DIABETES MELLITUS: ICD-10-CM

## 2023-12-06 PROBLEM — E11.9 TYPE 2 DIABETES MELLITUS, WITHOUT LONG-TERM CURRENT USE OF INSULIN: Status: ACTIVE | Noted: 2023-12-06

## 2023-12-06 PROCEDURE — 3008F PR BODY MASS INDEX (BMI) DOCUMENTED: ICD-10-PCS | Mod: CPTII,S$GLB,, | Performed by: INTERNAL MEDICINE

## 2023-12-06 PROCEDURE — 3044F HG A1C LEVEL LT 7.0%: CPT | Mod: CPTII,S$GLB,, | Performed by: DERMATOLOGY

## 2023-12-06 PROCEDURE — 99204 OFFICE O/P NEW MOD 45 MIN: CPT | Mod: S$GLB,,, | Performed by: DERMATOLOGY

## 2023-12-06 PROCEDURE — 87070 CULTURE OTHR SPECIMN AEROBIC: CPT | Performed by: DERMATOLOGY

## 2023-12-06 PROCEDURE — 99999 PR PBB SHADOW E&M-EST. PATIENT-LVL IV: CPT | Mod: PBBFAC,,, | Performed by: INTERNAL MEDICINE

## 2023-12-06 PROCEDURE — 99214 PR OFFICE/OUTPT VISIT, EST, LEVL IV, 30-39 MIN: ICD-10-PCS | Mod: S$GLB,,, | Performed by: INTERNAL MEDICINE

## 2023-12-06 PROCEDURE — 4010F PR ACE/ARB THEARPY RXD/TAKEN: ICD-10-PCS | Mod: CPTII,S$GLB,, | Performed by: DERMATOLOGY

## 2023-12-06 PROCEDURE — 3044F PR MOST RECENT HEMOGLOBIN A1C LEVEL <7.0%: ICD-10-PCS | Mod: CPTII,S$GLB,, | Performed by: DERMATOLOGY

## 2023-12-06 PROCEDURE — 3044F HG A1C LEVEL LT 7.0%: CPT | Mod: CPTII,S$GLB,, | Performed by: INTERNAL MEDICINE

## 2023-12-06 PROCEDURE — 3074F PR MOST RECENT SYSTOLIC BLOOD PRESSURE < 130 MM HG: ICD-10-PCS | Mod: CPTII,S$GLB,, | Performed by: INTERNAL MEDICINE

## 2023-12-06 PROCEDURE — 1159F PR MEDICATION LIST DOCUMENTED IN MEDICAL RECORD: ICD-10-PCS | Mod: CPTII,S$GLB,, | Performed by: DERMATOLOGY

## 2023-12-06 PROCEDURE — 87186 SC STD MICRODIL/AGAR DIL: CPT | Performed by: DERMATOLOGY

## 2023-12-06 PROCEDURE — 99204 PR OFFICE/OUTPT VISIT, NEW, LEVL IV, 45-59 MIN: ICD-10-PCS | Mod: S$GLB,,, | Performed by: DERMATOLOGY

## 2023-12-06 PROCEDURE — 99999 PR PBB SHADOW E&M-EST. PATIENT-LVL IV: ICD-10-PCS | Mod: PBBFAC,,, | Performed by: INTERNAL MEDICINE

## 2023-12-06 PROCEDURE — 99999 PR PBB SHADOW E&M-EST. PATIENT-LVL III: CPT | Mod: PBBFAC,,, | Performed by: DERMATOLOGY

## 2023-12-06 PROCEDURE — 1160F PR REVIEW ALL MEDS BY PRESCRIBER/CLIN PHARMACIST DOCUMENTED: ICD-10-PCS | Mod: CPTII,S$GLB,, | Performed by: DERMATOLOGY

## 2023-12-06 PROCEDURE — 99214 OFFICE O/P EST MOD 30 MIN: CPT | Mod: S$GLB,,, | Performed by: INTERNAL MEDICINE

## 2023-12-06 PROCEDURE — 1160F RVW MEDS BY RX/DR IN RCRD: CPT | Mod: CPTII,S$GLB,, | Performed by: DERMATOLOGY

## 2023-12-06 PROCEDURE — 87077 CULTURE AEROBIC IDENTIFY: CPT | Performed by: DERMATOLOGY

## 2023-12-06 PROCEDURE — 1159F PR MEDICATION LIST DOCUMENTED IN MEDICAL RECORD: ICD-10-PCS | Mod: CPTII,S$GLB,, | Performed by: INTERNAL MEDICINE

## 2023-12-06 PROCEDURE — 4010F ACE/ARB THERAPY RXD/TAKEN: CPT | Mod: CPTII,S$GLB,, | Performed by: DERMATOLOGY

## 2023-12-06 PROCEDURE — 3008F BODY MASS INDEX DOCD: CPT | Mod: CPTII,S$GLB,, | Performed by: INTERNAL MEDICINE

## 2023-12-06 PROCEDURE — 3074F SYST BP LT 130 MM HG: CPT | Mod: CPTII,S$GLB,, | Performed by: INTERNAL MEDICINE

## 2023-12-06 PROCEDURE — 4010F PR ACE/ARB THEARPY RXD/TAKEN: ICD-10-PCS | Mod: CPTII,S$GLB,, | Performed by: INTERNAL MEDICINE

## 2023-12-06 PROCEDURE — 4010F ACE/ARB THERAPY RXD/TAKEN: CPT | Mod: CPTII,S$GLB,, | Performed by: INTERNAL MEDICINE

## 2023-12-06 PROCEDURE — 99999 PR PBB SHADOW E&M-EST. PATIENT-LVL III: ICD-10-PCS | Mod: PBBFAC,,, | Performed by: DERMATOLOGY

## 2023-12-06 PROCEDURE — 3044F PR MOST RECENT HEMOGLOBIN A1C LEVEL <7.0%: ICD-10-PCS | Mod: CPTII,S$GLB,, | Performed by: INTERNAL MEDICINE

## 2023-12-06 PROCEDURE — 3078F DIAST BP <80 MM HG: CPT | Mod: CPTII,S$GLB,, | Performed by: INTERNAL MEDICINE

## 2023-12-06 PROCEDURE — 1159F MED LIST DOCD IN RCRD: CPT | Mod: CPTII,S$GLB,, | Performed by: DERMATOLOGY

## 2023-12-06 PROCEDURE — 3078F PR MOST RECENT DIASTOLIC BLOOD PRESSURE < 80 MM HG: ICD-10-PCS | Mod: CPTII,S$GLB,, | Performed by: INTERNAL MEDICINE

## 2023-12-06 PROCEDURE — 1159F MED LIST DOCD IN RCRD: CPT | Mod: CPTII,S$GLB,, | Performed by: INTERNAL MEDICINE

## 2023-12-06 RX ORDER — DOXYCYCLINE 100 MG/1
CAPSULE ORAL
Qty: 14 CAPSULE | Refills: 0 | Status: SHIPPED | OUTPATIENT
Start: 2023-12-06

## 2023-12-06 RX ORDER — ROSUVASTATIN CALCIUM 10 MG/1
10 TABLET, COATED ORAL DAILY
Qty: 90 TABLET | Refills: 1 | Status: SHIPPED | OUTPATIENT
Start: 2023-12-06 | End: 2024-02-02

## 2023-12-06 RX ORDER — TRIAMCINOLONE ACETONIDE 1 MG/G
CREAM TOPICAL 2 TIMES DAILY
Qty: 45 G | Refills: 0 | Status: SHIPPED | OUTPATIENT
Start: 2023-12-06

## 2023-12-06 RX ORDER — MUPIROCIN 20 MG/G
OINTMENT TOPICAL
Qty: 30 G | Refills: 1 | Status: SHIPPED | OUTPATIENT
Start: 2023-12-06

## 2023-12-06 RX ORDER — KETOCONAZOLE 20 MG/ML
SHAMPOO, SUSPENSION TOPICAL
Qty: 120 ML | Refills: 11 | Status: SHIPPED | OUTPATIENT
Start: 2023-12-06

## 2023-12-06 NOTE — PROGRESS NOTES
Subjective:      Patient ID:  Renzo Salgado is a 53 y.o. male who presents for   Chief Complaint   Patient presents with    Spot     Red patch behind right ear. Present for years. Scaly.  Also reports scaling in scalp.  Currently using: cortizone and dermatest for psoriasis (OTC). Reports they have helped a little bit but it is still present. Pt reports getting the same thing in groin area. However, the cortisone cleared that up.       History of Present Illness: The patient presents with chief complaint of rash.  Location: scalp  Duration:  2 years  Signs/Symptoms: scaly, itching    Prior treatments: dermarest, cortisone-10          Review of Systems   Constitutional:  Negative for fever and chills.   Gastrointestinal:  Negative for nausea and vomiting.   Skin:  Positive for itching, rash, dry skin and activity-related sunscreen use. Negative for daily sunscreen use and recent sunburn.   Hematologic/Lymphatic: Does not bruise/bleed easily.       Objective:   Physical Exam   Constitutional: He appears well-developed and well-nourished. No distress.   Neurological: He is alert and oriented to person, place, and time. He is not disoriented.   Psychiatric: He has a normal mood and affect.   Skin:   Areas Examined (abnormalities noted in diagram):   Head / Face Inspection Performed  Neck Inspection Performed  RUE Inspected  LUE Inspection Performed  Nails and Digits Inspection Performed             Assessment / Plan:        Dermatitis  -     Ambulatory referral/consult to Dermatology  -     doxycycline (MONODOX) 100 MG capsule; Take twice a day with food. May cause upset stomach  Dispense: 14 capsule; Refill: 0  -     mupirocin (BACTROBAN) 2 % ointment; AAA bid with Q-tip. Antibiotic ointment.  Dispense: 30 g; Refill: 1  -     ketoconazole (NIZORAL) 2 % shampoo; Wash hair with medicated shampoo at least 2x/week - let sit on scalp at least 5 minutes prior to rinsing  Dispense: 120 mL; Refill: 11  -     Aerobic  culture  -     possible allergic contact dermatitis/eczema with secondary infection versus early psoriasis.  Discussed use of Free and Clear shampoo and conditioner.  Culture done today.  We will start above medications, if culture results is negative for bacterial growth, then we will start topical clobetasol solution twice daily for potential eczema.  The patient acknowledged understanding.    Side effect profile of doxy reviewed including increased sun sensitivity and upset stomach.           Follow up in about 2 months (around 2/6/2024).

## 2023-12-06 NOTE — PATIENT INSTRUCTIONS
Check with your pharmacy regarding new shingles, covid, flu vaccine.     Notify your eye doctor that you have diabetes and need a diabetic retinal exam.

## 2023-12-10 LAB — BACTERIA SPEC AEROBE CULT: ABNORMAL

## 2023-12-20 ENCOUNTER — PATIENT MESSAGE (OUTPATIENT)
Dept: DERMATOLOGY | Facility: CLINIC | Age: 53
End: 2023-12-20
Payer: COMMERCIAL

## 2024-01-26 ENCOUNTER — PATIENT MESSAGE (OUTPATIENT)
Dept: INTERNAL MEDICINE | Facility: CLINIC | Age: 54
End: 2024-01-26
Payer: COMMERCIAL

## 2024-02-02 ENCOUNTER — OFFICE VISIT (OUTPATIENT)
Dept: INTERNAL MEDICINE | Facility: CLINIC | Age: 54
End: 2024-02-02
Payer: COMMERCIAL

## 2024-02-02 DIAGNOSIS — E78.5 HYPERLIPIDEMIA ASSOCIATED WITH TYPE 2 DIABETES MELLITUS: Primary | ICD-10-CM

## 2024-02-02 DIAGNOSIS — E11.69 HYPERLIPIDEMIA ASSOCIATED WITH TYPE 2 DIABETES MELLITUS: Primary | ICD-10-CM

## 2024-02-02 PROCEDURE — 1159F MED LIST DOCD IN RCRD: CPT | Mod: CPTII,95,, | Performed by: INTERNAL MEDICINE

## 2024-02-02 PROCEDURE — 99213 OFFICE O/P EST LOW 20 MIN: CPT | Mod: 95,,, | Performed by: INTERNAL MEDICINE

## 2024-02-02 RX ORDER — ATORVASTATIN CALCIUM 10 MG/1
10 TABLET, FILM COATED ORAL DAILY
Qty: 90 TABLET | Refills: 3 | Status: SHIPPED | OUTPATIENT
Start: 2024-02-02 | End: 2024-06-06

## 2024-02-02 NOTE — PROGRESS NOTES
Subjective:      Patient ID: Renzo Salgado is a 53 y.o. male.    Chief Complaint: Hyperlipidemia    Hyperlipidemia  Pertinent negatives include no chest pain.       The patient location is: [place]  The chief complaint leading to consultation is:  Hyperlipidemia    Visit type:  Audiovisual    Face to Face time with patient: 10   minutes of total time spent on the encounter, which includes face to face time and non-face to face time preparing to see the patient (eg, review of tests), Obtaining and/or reviewing separately obtained history, Documenting clinical information in the electronic or other health record, Independently interpreting results (not separately reported) and communicating results to the patient/family/caregiver, or Care coordination (not separately reported).         Each patient to whom he or she provides medical services by telemedicine is:  (1) informed of the relationship between the physician and patient and the respective role of any other health care provider with respect to management of the patient; and (2) notified that he or she may decline to receive medical services by telemedicine and may withdraw from such care at any time.    Notes:     Presents today to discuss hyperlipidemia medications.  Felt tired after 3 weeks of Crestor.  [place] better for a couple of days after stopping Crestor.  Retried and on tablet caused tiredness after 2 hours.   No associated pain or muscle weakness.    Review of Systems   Constitutional:  Negative for activity change and unexpected weight change.   HENT:  Negative for hearing loss, rhinorrhea and trouble swallowing.    Eyes:  Negative for discharge and visual disturbance.   Respiratory:  Negative for chest tightness and wheezing.    Cardiovascular:  Negative for chest pain and palpitations.   Gastrointestinal:  Negative for blood in stool, constipation, diarrhea and vomiting.   Endocrine: Negative for polydipsia and polyuria.   Genitourinary:   Negative for difficulty urinating, hematuria and urgency.   Musculoskeletal:  Negative for arthralgias, joint swelling and neck pain.   Neurological:  Negative for weakness and headaches.   Psychiatric/Behavioral:  Negative for confusion and dysphoric mood.      Objective:   There were no vitals taken for this visit.    Physical Exam  Constitutional:       General: He is not in acute distress.     Appearance: He is well-developed.   Cardiovascular:      Rate and Rhythm: Normal rate.   Pulmonary:      Effort: Pulmonary effort is normal.      Breath sounds: Normal breath sounds.   Skin:     General: Skin is warm and dry.   Psychiatric:         Behavior: Behavior normal.         Lab Results   Component Value Date    WBC 7.10 08/21/2020    HGB 15.9 08/21/2020    HGB 15.3 09/05/2018    HGB 16.1 08/27/2018    HCT 48.7 08/21/2020    MCV 93 08/21/2020    MCV 90 09/05/2018    MCV 94 08/27/2018     08/21/2020    CHOL 150 06/03/2022    TRIG 110 06/03/2022    HDL 42 06/03/2022    LDLCALC 86.0 06/03/2022    LDLCALC 76.6 09/17/2021    LDLCALC 78.0 01/18/2016    ALT 31 07/14/2023    AST 23 07/14/2023     07/14/2023    K 4.4 07/14/2023    CALCIUM 9.8 07/14/2023     07/14/2023    CO2 24 07/14/2023    BUN 15 07/14/2023    CREATININE 0.9 07/14/2023    CREATININE 1.0 08/21/2020    CREATININE 1.0 09/05/2018    EGFRNORACEVR >60.0 07/14/2023    TSH 1.293 07/14/2023    TSH 1.852 08/21/2020    TSH 1.807 05/22/2019    PSA 0.77 01/18/2016     (H) 07/14/2023    HGBA1C 6.6 (H) 07/14/2023    [id number] 23 (L) 01/18/2016    TOTALTESTOST 241 (L) 08/21/2020    BNP 85 04/01/2016          The 10-year ASCVD risk score (Michael MARTÍNEZ, et al., 2019) is: 4.8%    Values used to calculate the score:      Age: 53 years      Sex: Male      Is Non- : No      Diabetic: No      Tobacco smoker: No      Systolic Blood Pressure: 118 mmHg      Is BP treated: Yes      HDL Cholesterol: 42 mg/dL      Total Cholesterol:  150 mg/dL     Assessment:     1. Hyperlipidemia associated with type 2 diabetes mellitus      Plan:   1. Hyperlipidemia associated with type 2 diabetes mellitus  Overview:        Orders:  -     atorvastatin (LIPITOR) 10 MG tablet; Take 1 tablet (10 mg total) by mouth once daily.  Dispense: 90 tablet; Refill: 3        Patient Instructions   Take your atorvastatin in the evening.     Future Appointments   Date Time Provider Department Center   3/6/2024  7:40 AM LABORATORY, [facility] LAB [facility]   4/5/2024 11:20 AM Abundio Alvarado MD [facility] CARDIO [facility]   5/30/2024  7:40 AM LABORATORY, [facility]   6/6/2024  7:40 AM Fadi Damico [facility]       Lab Frequency Next Occurrence   Ambulatory referral/consult to Diabetes Education Once 12/13/2023   ALT (SGPT) Once 03/06/2024   Lipid Panel Once 03/05/2024   Lipid Panel Once 06/03/2024   Hemoglobin A1C Once 06/03/2024   Comprehensive Metabolic Panel Once 06/03/2024   CBC Auto Differential Once 06/03/2024   PSA, Screening Once 06/03/2024   TSH Once 06/03/2024       No follow-ups on file.

## 2024-02-07 DIAGNOSIS — E11.9 TYPE 2 DIABETES MELLITUS WITHOUT COMPLICATION: ICD-10-CM

## 2024-02-14 ENCOUNTER — PATIENT OUTREACH (OUTPATIENT)
Dept: ADMINISTRATIVE | Facility: HOSPITAL | Age: 54
End: 2024-02-14
Payer: COMMERCIAL

## 2024-03-06 ENCOUNTER — LAB VISIT (OUTPATIENT)
Dept: LAB | Facility: HOSPITAL | Age: 54
End: 2024-03-06
Attending: INTERNAL MEDICINE
Payer: COMMERCIAL

## 2024-03-06 DIAGNOSIS — E11.69 HYPERLIPIDEMIA ASSOCIATED WITH TYPE 2 DIABETES MELLITUS: ICD-10-CM

## 2024-03-06 DIAGNOSIS — E78.5 HYPERLIPIDEMIA ASSOCIATED WITH TYPE 2 DIABETES MELLITUS: ICD-10-CM

## 2024-03-06 LAB
ALT SERPL W/O P-5'-P-CCNC: 31 U/L (ref 10–44)
CHOLEST SERPL-MCNC: 124 MG/DL (ref 120–199)
CHOLEST/HDLC SERPL: 3 {RATIO} (ref 2–5)
HDLC SERPL-MCNC: 42 MG/DL (ref 40–75)
HDLC SERPL: 33.9 % (ref 20–50)
LDLC SERPL CALC-MCNC: 53.4 MG/DL (ref 63–159)
NONHDLC SERPL-MCNC: 82 MG/DL
TRIGL SERPL-MCNC: 143 MG/DL (ref 30–150)

## 2024-03-06 PROCEDURE — 80061 LIPID PANEL: CPT | Performed by: INTERNAL MEDICINE

## 2024-03-06 PROCEDURE — 84460 ALANINE AMINO (ALT) (SGPT): CPT | Performed by: INTERNAL MEDICINE

## 2024-03-06 PROCEDURE — 36415 COLL VENOUS BLD VENIPUNCTURE: CPT | Performed by: INTERNAL MEDICINE

## 2024-03-21 DIAGNOSIS — I48.0 PAF (PAROXYSMAL ATRIAL FIBRILLATION): Primary | ICD-10-CM

## 2024-04-01 DIAGNOSIS — I48.0 PAROXYSMAL ATRIAL FIBRILLATION: ICD-10-CM

## 2024-04-01 DIAGNOSIS — I10 ESSENTIAL HYPERTENSION: ICD-10-CM

## 2024-04-01 DIAGNOSIS — I48.0 PAF (PAROXYSMAL ATRIAL FIBRILLATION): ICD-10-CM

## 2024-04-01 RX ORDER — DILTIAZEM HYDROCHLORIDE 120 MG/1
120 CAPSULE, COATED, EXTENDED RELEASE ORAL DAILY
Qty: 90 CAPSULE | Refills: 3 | Status: SHIPPED | OUTPATIENT
Start: 2024-04-01 | End: 2025-04-01

## 2024-04-01 RX ORDER — METOPROLOL SUCCINATE 100 MG/1
100 TABLET, EXTENDED RELEASE ORAL 2 TIMES DAILY
Qty: 180 TABLET | Refills: 3 | Status: SHIPPED | OUTPATIENT
Start: 2024-04-01

## 2024-04-01 RX ORDER — VALSARTAN 80 MG/1
80 TABLET ORAL DAILY
Qty: 90 TABLET | Refills: 3 | Status: SHIPPED | OUTPATIENT
Start: 2024-04-01 | End: 2024-06-06 | Stop reason: SDUPTHER

## 2024-04-03 NOTE — TELEPHONE ENCOUNTER
Patient scheduled with Za 03/16   Pt now completely alert, oriented to self, bed alarm in place for pt safety

## 2024-04-05 ENCOUNTER — OFFICE VISIT (OUTPATIENT)
Dept: CARDIOLOGY | Facility: CLINIC | Age: 54
End: 2024-04-05
Payer: COMMERCIAL

## 2024-04-05 ENCOUNTER — HOSPITAL ENCOUNTER (OUTPATIENT)
Dept: CARDIOLOGY | Facility: HOSPITAL | Age: 54
Discharge: HOME | End: 2024-04-05
Attending: INTERNAL MEDICINE
Payer: COMMERCIAL

## 2024-04-05 VITALS
HEART RATE: 87 BPM | SYSTOLIC BLOOD PRESSURE: 131 MMHG | OXYGEN SATURATION: 98 % | WEIGHT: 199.06 LBS | BODY MASS INDEX: 31.17 KG/M2 | DIASTOLIC BLOOD PRESSURE: 83 MMHG

## 2024-04-05 DIAGNOSIS — G47.33 OSA (OBSTRUCTIVE SLEEP APNEA): ICD-10-CM

## 2024-04-05 DIAGNOSIS — I15.2 HYPERTENSION ASSOCIATED WITH DIABETES: ICD-10-CM

## 2024-04-05 DIAGNOSIS — E78.49 OTHER HYPERLIPIDEMIA: ICD-10-CM

## 2024-04-05 DIAGNOSIS — E11.69 TYPE 2 DIABETES MELLITUS WITH OTHER SPECIFIED COMPLICATION, WITHOUT LONG-TERM CURRENT USE OF INSULIN: ICD-10-CM

## 2024-04-05 DIAGNOSIS — E11.59 HYPERTENSION ASSOCIATED WITH DIABETES: ICD-10-CM

## 2024-04-05 DIAGNOSIS — I10 ESSENTIAL HYPERTENSION: ICD-10-CM

## 2024-04-05 DIAGNOSIS — I48.0 PAF (PAROXYSMAL ATRIAL FIBRILLATION): ICD-10-CM

## 2024-04-05 DIAGNOSIS — I48.0 PAF (PAROXYSMAL ATRIAL FIBRILLATION): Primary | ICD-10-CM

## 2024-04-05 DIAGNOSIS — I48.0 PAROXYSMAL ATRIAL FIBRILLATION: ICD-10-CM

## 2024-04-05 PROCEDURE — 99214 OFFICE O/P EST MOD 30 MIN: CPT | Mod: S$GLB,,, | Performed by: INTERNAL MEDICINE

## 2024-04-05 PROCEDURE — 99999 PR PBB SHADOW E&M-EST. PATIENT-LVL IV: CPT | Mod: PBBFAC,,, | Performed by: INTERNAL MEDICINE

## 2024-04-05 PROCEDURE — 93010 ELECTROCARDIOGRAM REPORT: CPT | Mod: ,,, | Performed by: INTERNAL MEDICINE

## 2024-04-05 PROCEDURE — 3008F BODY MASS INDEX DOCD: CPT | Mod: CPTII,S$GLB,, | Performed by: INTERNAL MEDICINE

## 2024-04-05 PROCEDURE — 3075F SYST BP GE 130 - 139MM HG: CPT | Mod: CPTII,S$GLB,, | Performed by: INTERNAL MEDICINE

## 2024-04-05 PROCEDURE — 93005 ELECTROCARDIOGRAM TRACING: CPT

## 2024-04-05 PROCEDURE — G2211 COMPLEX E/M VISIT ADD ON: HCPCS | Mod: S$GLB,,, | Performed by: INTERNAL MEDICINE

## 2024-04-05 PROCEDURE — 1160F RVW MEDS BY RX/DR IN RCRD: CPT | Mod: CPTII,S$GLB,, | Performed by: INTERNAL MEDICINE

## 2024-04-05 PROCEDURE — 3079F DIAST BP 80-89 MM HG: CPT | Mod: CPTII,S$GLB,, | Performed by: INTERNAL MEDICINE

## 2024-04-05 PROCEDURE — 4010F ACE/ARB THERAPY RXD/TAKEN: CPT | Mod: CPTII,S$GLB,, | Performed by: INTERNAL MEDICINE

## 2024-04-05 PROCEDURE — 1159F MED LIST DOCD IN RCRD: CPT | Mod: CPTII,S$GLB,, | Performed by: INTERNAL MEDICINE

## 2024-04-05 NOTE — PROGRESS NOTES
Subjective:   Patient ID:  Renzo Salgado is a 53 y.o. male who presents for cardiac consult of No chief complaint on file.      The patient came in today for cardiac consult of No chief complaint on file.    Renzo Salgado is a 53 y.o. male  pt with PAFib on Eliquis, DM2, HTN, HLD,  KIKO, renal stones presents for follow up CV evaluation.     3/31/23  Bp and HR well controlled. BMI 30 - 196 lbs. He had COVID last May but nothing since then. He has enrolled in gym, does 40 min cardio and 30 -40 min weights.   ECG - AFib, V rate 72    4/5/24  BP and HR well controlled. BMI 31 - 199 lbs. Has DM2  - not on meds now  He is active, no CP/SOB.   Felt weak with statin - changed dose now.       Patient has fairly good exercise tolerance. Works with  company.     Patient is compliant with medications.    2D ECHO  4/2016  CONCLUSIONS     1 - Concentric remodeling.     2 - Normal left ventricular systolic function (EF 55-60%).     3 - Normal left ventricular diastolic function.     4 - Normal right ventricular systolic function .     5 - The estimated PA systolic pressure is 31 mmHg.     6 - Mild tricuspid regurgitation.       RICARDO   CONCLUSIONS     1 - No visualized thrombus in the left atrium with spontaneous echo contrast.     2 - No visualized thrombus in the left atrial appendage.     3 - Normal left ventricular systolic function (EF 55-60%).     4 - Indeterminate LV diastolic function.     5 - Trivial to mild mitral regurgitation.     6 - Trivial tricuspid regurgitation.     This document has been electronically    SIGNED BY: Abundio Alvarado MD On: 10/05/2018 11:02            Past Medical History:   Diagnosis Date    Atrial fibrillation     Hypertension             Past Surgical History:   Procedure Laterality Date    CARDIOVERSION N/A 10/05/2018    Procedure: CARDIOVERSION;  Surgeon: Abundio Alvarado MD;  [facility] CATH LAB;  Service: Cardiology;  Laterality: N/A;  Anesthesia notified 9/24 gcd     CYSTOSCOPY W/ URETERAL STENT PLACEMENT Left 09/04/2018    Procedure: CYSTOSCOPY, WITH URETERAL STENT INSERTION;  Surgeon: Ricky Baker IV, MD;  Location: [facility] OR;  Service: Urology;  Laterality: Left;    LASER LITHOTRIPSY Left 09/04/2018    Procedure: LITHOTRIPSY, USING LASER;  Surgeon: Ricky Baker IV, MD;  Location: [facility] OR;  Service: Urology;  Laterality: Left;    UPPER GASTROINTESTINAL ENDOSCOPY         Social History     Tobacco Use    Smoking status: Never    Smokeless tobacco: Never   Substance Use Topics    Alcohol use: No    Drug use: No       Family History   Problem Relation Age of Onset    Hypertension Mother     Asthma Son     Stroke Brother     Melanoma Neg Hx     Psoriasis Neg Hx     Lupus Neg Hx        Patient's Medications   New Prescriptions    No medications on file   Previous Medications    APIXABAN (ELIQUIS) 5 MG TAB    Take 1 tablet (5 mg total) by mouth 2 (two) times daily.    ATORVASTATIN (LIPITOR) 10 MG TABLET    Take 1 tablet (10 mg total) by mouth once daily.    CLOTRIMAZOLE (LOTRIMIN) 1 % CREAM    Apply topically 2 (two) times daily.    DILTIAZEM (CARDIZEM CD) 120 MG CP24    Take 1 capsule (120 mg total) by mouth once daily.    DOXYCYCLINE (MONODOX) 100 MG CAPSULE    Take twice a day with food. May cause upset stomach    KETOCONAZOLE (NIZORAL) 2 % SHAMPOO    Wash hair with medicated shampoo at least 2x/week - let sit on scalp at least 5 minutes prior to rinsing    METOPROLOL SUCCINATE (TOPROL-XL) 100 MG 24 HR TABLET    Take 1 tablet (100 mg total) by mouth 2 (two) times daily.    MUPIROCIN (BACTROBAN) 2 % OINTMENT    AAA bid with Q-tip. Antibiotic ointment.    TRIAMCINOLONE ACETONIDE 0.1% (KENALOG) 0.1 % CREAM    Apply topically 2 (two) times daily.    VALSARTAN (DIOVAN) 80 MG TABLET    Take 1 tablet (80 mg total) by mouth once daily.   Modified Medications    No medications on file   Discontinued Medications    No medications on file       Review of Systems   Constitutional:  Negative.    HENT: Negative.     Eyes: Negative.    Respiratory: Negative.  Negative for shortness of breath.    Cardiovascular: Negative.  Negative for chest pain and palpitations.   Gastrointestinal: Negative.    Genitourinary:  Negative for hematuria.   Musculoskeletal: Negative.    Skin: Negative.    Neurological: Negative.    Endo/Heme/Allergies: Negative.    Psychiatric/Behavioral: Negative.     All 12 systems otherwise negative.      Wt Readings from Last 3 Encounters:   04/05/24 90.3 kg (199 lb 1.2 oz)   12/06/23 90.6 kg (199 lb 11.8 oz)   03/31/23 89.2 kg (196 lb 10.4 oz)     Temp Readings from Last 3 Encounters:   12/06/23 96.9 °F (36.1 °C) (Tympanic)   03/16/23 98.5 °F (36.9 °C) (Tympanic)   03/10/23 98 °F (36.7 °C) (Tympanic)     BP Readings from Last 3 Encounters:   04/05/24 131/83   12/06/23 118/78   03/31/23 122/80     Pulse Readings from Last 3 Encounters:   04/05/24 87   12/06/23 67   03/31/23 84       /83 (BP Location: Right arm, Patient Position: Sitting, BP Method: Small (Automatic))   Pulse 87   Wt 90.3 kg (199 lb 1.2 oz)   SpO2 98%   BMI 31.17 kg/m²     Objective:   Physical Exam  Vitals and nursing note reviewed.   Constitutional:       General: He is not in acute distress.     Appearance: He is well-developed. He is not diaphoretic.   HENT:      Head: Normocephalic and atraumatic.      Nose: Nose normal.      Mouth/Throat:      Pharynx: No oropharyngeal exudate.   Eyes:      General: No scleral icterus.        Right eye: No discharge.         Left eye: No discharge.      Conjunctiva/sclera: Conjunctivae normal.   Neck:      Thyroid: No thyromegaly.      Vascular: No JVD.   Cardiovascular:      Rate and Rhythm: Normal rate. Rhythm irregular.      Heart sounds: S1 normal and S2 normal. No murmur heard.     No friction rub. No gallop. No S3 or S4 sounds.   Pulmonary:      Effort: Pulmonary effort is normal. No respiratory distress.      Breath sounds: Normal breath sounds. No stridor.  No wheezing or rales.   Chest:      Chest wall: No tenderness.   Abdominal:      General: Bowel sounds are normal. There is no distension.      Palpations: Abdomen is soft. There is no mass.      Tenderness: There is no abdominal tenderness. There is no rebound.   Genitourinary:     Comments: Deferred  Musculoskeletal:         General: No tenderness or deformity. Normal range of motion.      Cervical back: Normal range of motion and neck supple.   Lymphadenopathy:      Cervical: No cervical adenopathy.   Skin:     General: Skin is warm and dry.      Coloration: Skin is not pale.      Findings: No erythema or rash.   Neurological:      Mental Status: He is alert and oriented to person, place, and time.      Motor: No abnormal muscle tone.      Coordination: Coordination normal.   Psychiatric:         Behavior: Behavior normal.         Thought Content: Thought content normal.         Judgment: Judgment normal.         Lab Results   Component Value Date     07/14/2023    K 4.4 07/14/2023     07/14/2023    CO2 24 07/14/2023    BUN 15 07/14/2023    CREATININE 0.9 07/14/2023     (H) 07/14/2023    HGBA1C 6.6 (H) 07/14/2023    AST 23 07/14/2023    ALT 31 03/06/2024    ALBUMIN 4.0 07/14/2023    ALBUMIN 4.2 09/04/2020    PROT 7.6 07/14/2023    BILITOT 0.4 07/14/2023    WBC 7.10 08/21/2020    HGB 15.9 08/21/2020    HCT 48.7 08/21/2020    MCV 93 08/21/2020     08/21/2020    TSH 1.293 07/14/2023    CHOL 124 03/06/2024    HDL 42 03/06/2024    LDLCALC 53.4 (L) 03/06/2024    TRIG 143 03/06/2024    BNP 85 04/01/2016     Assessment:      1. PAF (paroxysmal atrial fibrillation)    2. Essential hypertension    3. Other hyperlipidemia    4. KIKO (obstructive sleep apnea)    5. Paroxysmal atrial fibrillation    6. Hypertension associated with diabetes    7. Type 2 diabetes mellitus with other specified complication, without long-term current use of insulin          Plan:   1.PAFib s/p RICARDO/DCCV 10/2018 in Afib  -  stress negative in past  - cont BB and CCB   - cont Eliquis for AC  - will rate control now, if symptoms occur will refer to EP for ablation vs antiarrythmic vs repeat DCCV - defer for now    2. HTN  - cont meds    3. Sleep apnea   - cont CPAP     4. Overweight, BMI 29, goal < 170 lbs--> BMI 30 - 196 lbs -->  lbs   - cont weight loss    5. HLD with elevated Tgs - improved   - changed meds from Crestor to Lipitor     6. DM2 - A1c 6.6  - cont tx and f.u PCP    Visit today included increased complexity associated with the care of the episodic problem PAF addressed and managing the longitudinal care of the patient due to the serious and/or complex managed problem(s) .      Thank you for allowing me to participate in this patient's care. Please do not hesitate to contact me with any questions or concerns. Consult note has been forwarded to the referral physician.

## 2024-04-07 LAB
OHS QRS DURATION: 86 MS
OHS QTC CALCULATION: 462 MS

## 2024-05-27 ENCOUNTER — PATIENT OUTREACH (OUTPATIENT)
Dept: ADMINISTRATIVE | Facility: HOSPITAL | Age: 54
End: 2024-05-27
Payer: COMMERCIAL

## 2024-05-30 ENCOUNTER — LAB VISIT (OUTPATIENT)
Dept: LAB | Facility: HOSPITAL | Age: 54
End: 2024-05-30
Attending: INTERNAL MEDICINE
Payer: COMMERCIAL

## 2024-05-30 DIAGNOSIS — E11.9 TYPE 2 DIABETES MELLITUS WITHOUT COMPLICATION: ICD-10-CM

## 2024-05-30 DIAGNOSIS — Z00.00 PREVENTATIVE HEALTH CARE: ICD-10-CM

## 2024-05-30 LAB
ALBUMIN SERPL BCP-MCNC: 4.1 G/DL (ref 3.5–5.2)
ALBUMIN/CREAT UR: 42.3 UG/MG (ref 0–30)
ALP SERPL-CCNC: 61 U/L (ref 55–135)
ALT SERPL W/O P-5'-P-CCNC: 34 U/L (ref 10–44)
ANION GAP SERPL CALC-SCNC: 13 MMOL/L (ref 8–16)
AST SERPL-CCNC: 22 U/L (ref 10–40)
BASOPHILS # BLD AUTO: 0.07 K/UL (ref 0–0.2)
BASOPHILS NFR BLD: 1.1 % (ref 0–1.9)
BILIRUB SERPL-MCNC: 0.6 MG/DL (ref 0.1–1)
BUN SERPL-MCNC: 15 MG/DL (ref 6–20)
CALCIUM SERPL-MCNC: 10 MG/DL (ref 8.7–10.5)
CHLORIDE SERPL-SCNC: 104 MMOL/L (ref 95–110)
CHOLEST SERPL-MCNC: 178 MG/DL (ref 120–199)
CHOLEST/HDLC SERPL: 4.2 {RATIO} (ref 2–5)
CO2 SERPL-SCNC: 23 MMOL/L (ref 23–29)
COMPLEXED PSA SERPL-MCNC: 0.73 NG/ML (ref 0–4)
CREAT SERPL-MCNC: 1 MG/DL (ref 0.5–1.4)
CREAT UR-MCNC: 189 MG/DL (ref 23–375)
DIFFERENTIAL METHOD BLD: NORMAL
EOSINOPHIL # BLD AUTO: 0.3 K/UL (ref 0–0.5)
EOSINOPHIL NFR BLD: 3.8 % (ref 0–8)
ERYTHROCYTE [DISTWIDTH] IN BLOOD BY AUTOMATED COUNT: 13.6 % (ref 11.5–14.5)
EST. GFR  (NO RACE VARIABLE): >60 ML/MIN/1.73 M^2
ESTIMATED AVG GLUCOSE: 151 MG/DL (ref 68–131)
GLUCOSE SERPL-MCNC: 133 MG/DL (ref 70–110)
HBA1C MFR BLD: 6.9 % (ref 4–5.6)
HCT VFR BLD AUTO: 48.7 % (ref 40–54)
HDLC SERPL-MCNC: 42 MG/DL (ref 40–75)
HDLC SERPL: 23.6 % (ref 20–50)
HGB BLD-MCNC: 16.1 G/DL (ref 14–18)
IMM GRANULOCYTES # BLD AUTO: 0.02 K/UL (ref 0–0.04)
IMM GRANULOCYTES NFR BLD AUTO: 0.3 % (ref 0–0.5)
LDLC SERPL CALC-MCNC: 97.2 MG/DL (ref 63–159)
LYMPHOCYTES # BLD AUTO: 2.3 K/UL (ref 1–4.8)
LYMPHOCYTES NFR BLD: 34.9 % (ref 18–48)
MCH RBC QN AUTO: 30.7 PG (ref 27–31)
MCHC RBC AUTO-ENTMCNC: 33.1 G/DL (ref 32–36)
MCV RBC AUTO: 93 FL (ref 82–98)
MICROALBUMIN UR DL<=1MG/L-MCNC: 80 UG/ML
MONOCYTES # BLD AUTO: 0.5 K/UL (ref 0.3–1)
MONOCYTES NFR BLD: 7.7 % (ref 4–15)
NEUTROPHILS # BLD AUTO: 3.4 K/UL (ref 1.8–7.7)
NEUTROPHILS NFR BLD: 52.2 % (ref 38–73)
NONHDLC SERPL-MCNC: 136 MG/DL
NRBC BLD-RTO: 0 /100 WBC
PLATELET # BLD AUTO: 191 K/UL (ref 150–450)
PMV BLD AUTO: 11.3 FL (ref 9.2–12.9)
POTASSIUM SERPL-SCNC: 4.9 MMOL/L (ref 3.5–5.1)
PROT SERPL-MCNC: 7.7 G/DL (ref 6–8.4)
RBC # BLD AUTO: 5.25 M/UL (ref 4.6–6.2)
SODIUM SERPL-SCNC: 140 MMOL/L (ref 136–145)
TRIGL SERPL-MCNC: 194 MG/DL (ref 30–150)
TSH SERPL DL<=0.005 MIU/L-ACNC: 2.7 UIU/ML (ref 0.4–4)
WBC # BLD AUTO: 6.51 K/UL (ref 3.9–12.7)

## 2024-05-30 PROCEDURE — 84153 ASSAY OF PSA TOTAL: CPT | Performed by: INTERNAL MEDICINE

## 2024-05-30 PROCEDURE — 84443 ASSAY THYROID STIM HORMONE: CPT | Performed by: INTERNAL MEDICINE

## 2024-05-30 PROCEDURE — 83036 HEMOGLOBIN GLYCOSYLATED A1C: CPT | Performed by: INTERNAL MEDICINE

## 2024-05-30 PROCEDURE — 36415 COLL VENOUS BLD VENIPUNCTURE: CPT | Performed by: INTERNAL MEDICINE

## 2024-05-30 PROCEDURE — 80053 COMPREHEN METABOLIC PANEL: CPT | Performed by: INTERNAL MEDICINE

## 2024-05-30 PROCEDURE — 85025 COMPLETE CBC W/AUTO DIFF WBC: CPT | Performed by: INTERNAL MEDICINE

## 2024-05-30 PROCEDURE — 80061 LIPID PANEL: CPT | Performed by: INTERNAL MEDICINE

## 2024-05-30 PROCEDURE — 82043 UR ALBUMIN QUANTITATIVE: CPT | Performed by: INTERNAL MEDICINE

## 2024-06-06 ENCOUNTER — TELEPHONE (OUTPATIENT)
Dept: DIABETES | Facility: CLINIC | Age: 54
End: 2024-06-06
Payer: COMMERCIAL

## 2024-06-06 ENCOUNTER — OFFICE VISIT (OUTPATIENT)
Dept: INTERNAL MEDICINE | Facility: CLINIC | Age: 54
End: 2024-06-06
Payer: COMMERCIAL

## 2024-06-06 VITALS
BODY MASS INDEX: 30.52 KG/M2 | WEIGHT: 194.44 LBS | TEMPERATURE: 97 F | DIASTOLIC BLOOD PRESSURE: 80 MMHG | HEIGHT: 67 IN | HEART RATE: 71 BPM | OXYGEN SATURATION: 98 % | SYSTOLIC BLOOD PRESSURE: 130 MMHG

## 2024-06-06 DIAGNOSIS — I15.2 HYPERTENSION ASSOCIATED WITH DIABETES: ICD-10-CM

## 2024-06-06 DIAGNOSIS — Z00.00 ROUTINE GENERAL MEDICAL EXAMINATION AT A HEALTH CARE FACILITY: Primary | ICD-10-CM

## 2024-06-06 DIAGNOSIS — E11.59 HYPERTENSION ASSOCIATED WITH DIABETES: ICD-10-CM

## 2024-06-06 DIAGNOSIS — I10 ESSENTIAL HYPERTENSION: ICD-10-CM

## 2024-06-06 DIAGNOSIS — Z23 NEED FOR STREPTOCOCCUS PNEUMONIAE VACCINATION: ICD-10-CM

## 2024-06-06 DIAGNOSIS — E11.69 HYPERLIPIDEMIA ASSOCIATED WITH TYPE 2 DIABETES MELLITUS: ICD-10-CM

## 2024-06-06 DIAGNOSIS — I48.0 PAF (PAROXYSMAL ATRIAL FIBRILLATION): ICD-10-CM

## 2024-06-06 DIAGNOSIS — E78.5 HYPERLIPIDEMIA ASSOCIATED WITH TYPE 2 DIABETES MELLITUS: ICD-10-CM

## 2024-06-06 DIAGNOSIS — R80.9 MICROALBUMINURIA: ICD-10-CM

## 2024-06-06 DIAGNOSIS — E11.69 TYPE 2 DIABETES MELLITUS WITH OTHER SPECIFIED COMPLICATION, WITHOUT LONG-TERM CURRENT USE OF INSULIN: ICD-10-CM

## 2024-06-06 PROCEDURE — G0009 ADMIN PNEUMOCOCCAL VACCINE: HCPCS | Mod: S$GLB,,, | Performed by: INTERNAL MEDICINE

## 2024-06-06 PROCEDURE — 99999 PR PBB SHADOW E&M-EST. PATIENT-LVL V: CPT | Mod: PBBFAC,,, | Performed by: INTERNAL MEDICINE

## 2024-06-06 PROCEDURE — 3008F BODY MASS INDEX DOCD: CPT | Mod: CPTII,S$GLB,, | Performed by: INTERNAL MEDICINE

## 2024-06-06 PROCEDURE — 90677 PCV20 VACCINE IM: CPT | Mod: S$GLB,,, | Performed by: INTERNAL MEDICINE

## 2024-06-06 PROCEDURE — 3079F DIAST BP 80-89 MM HG: CPT | Mod: CPTII,S$GLB,, | Performed by: INTERNAL MEDICINE

## 2024-06-06 PROCEDURE — 3044F HG A1C LEVEL LT 7.0%: CPT | Mod: CPTII,S$GLB,, | Performed by: INTERNAL MEDICINE

## 2024-06-06 PROCEDURE — 3075F SYST BP GE 130 - 139MM HG: CPT | Mod: CPTII,S$GLB,, | Performed by: INTERNAL MEDICINE

## 2024-06-06 PROCEDURE — 99396 PREV VISIT EST AGE 40-64: CPT | Mod: 25,S$GLB,, | Performed by: INTERNAL MEDICINE

## 2024-06-06 PROCEDURE — 3060F POS MICROALBUMINURIA REV: CPT | Mod: CPTII,S$GLB,, | Performed by: INTERNAL MEDICINE

## 2024-06-06 PROCEDURE — 3066F NEPHROPATHY DOC TX: CPT | Mod: CPTII,S$GLB,, | Performed by: INTERNAL MEDICINE

## 2024-06-06 PROCEDURE — 1159F MED LIST DOCD IN RCRD: CPT | Mod: CPTII,S$GLB,, | Performed by: INTERNAL MEDICINE

## 2024-06-06 PROCEDURE — 4010F ACE/ARB THERAPY RXD/TAKEN: CPT | Mod: CPTII,S$GLB,, | Performed by: INTERNAL MEDICINE

## 2024-06-06 RX ORDER — PRAVASTATIN SODIUM 20 MG/1
20 TABLET ORAL DAILY
Qty: 90 TABLET | Refills: 3 | Status: SHIPPED | OUTPATIENT
Start: 2024-06-06 | End: 2025-06-06

## 2024-06-06 RX ORDER — VALSARTAN 160 MG/1
160 TABLET ORAL DAILY
Qty: 90 TABLET | Refills: 1 | Status: SHIPPED | OUTPATIENT
Start: 2024-06-06

## 2024-06-06 RX ORDER — CLOBETASOL PROPIONATE 0.46 MG/ML
SOLUTION TOPICAL
COMMUNITY
Start: 2024-05-23

## 2024-06-06 NOTE — PROGRESS NOTES
Subjective:      Patient ID: Renzo Salgado is a 53 y.o. male.    Chief Complaint: Follow-up    HPI      53 y.o. with  Patient Active Problem List   Diagnosis    Hypertension associated with diabetes    Renal stone    Ureteral stone    KIKO (obstructive sleep apnea)    PAF (paroxysmal atrial fibrillation)    Lip swelling    Hyperlipidemia associated with type 2 diabetes mellitus    Obesity (BMI 30-39.9)    Type 2 diabetes mellitus, without long-term current use of insulin    Microalbuminuria     Past Medical History:   Diagnosis Date    Atrial fibrillation     COVID 5/7/2022    Hypertension     Other hyperlipidemia 6/16/2021    Sleep apnea        Here today for annual prev exam.  Compliant with meds without significant side effects. Energy and appetite are good. Except self-discontinued his atorvastatin due to it causing fatigue    Past Surgical History:   Procedure Laterality Date    CARDIOVERSION N/A 10/05/2018    Procedure: CARDIOVERSION;  Surgeon: Abundio Alvarado MD;  Location: [facility] CATH LAB;  Service: Cardiology;  Laterality: N/A;  Anesthesia notified 9/24 gcd    CYSTOSCOPY W/ URETERAL STENT PLACEMENT Left 09/04/2018    Procedure: CYSTOSCOPY, WITH URETERAL STENT INSERTION;  Surgeon: Ricky Baker IV, MD;  Location: [facility] OR;  Service: Urology;  Laterality: Left;    LASER LITHOTRIPSY Left 09/04/2018    Procedure: LITHOTRIPSY, USING LASER;  Surgeon: Ricky Baker IV, MD;  Location: [facility] OR;  Service: Urology;  Laterality: Left;    UPPER GASTROINTESTINAL ENDOSCOPY       Social History     Socioeconomic History    Marital status:    Tobacco Use    Smoking status: Never    Smokeless tobacco: Never   Substance and Sexual Activity    Alcohol use: No    Drug use: No    Sexual activity: Yes     Partners: Female     Social Determinants of Health     Financial Resource Strain: Low Risk  (2/2/2024)    Overall Financial Resource Strain (CARDIA)     Difficulty of Paying Living Expenses: Not hard at all   Food  "Insecurity: No Food Insecurity (2/2/2024)    Hunger Vital Sign     Worried About Running Out of Food in the Last Year: Never true     Ran Out of Food in the Last Year: Never true   Transportation Needs: No Transportation Needs (2/2/2024)    PRAPARE - Transportation     Lack of Transportation (Medical): No     Lack of Transportation (Non-Medical): No   Physical Activity: Sufficiently Active (2/2/2024)    Exercise Vital Sign     Days of Exercise per Week: 5 days     Minutes of Exercise per Session: 150+ min   Stress: No Stress Concern Present (2/2/2024)    [employer] - Occupational Stress Questionnaire     Feeling of Stress : Not at all   Housing Stability: Unknown (2/2/2024)    Housing Stability Vital Sign     Unable to Pay for Housing in the Last Year: No     Unstable Housing in the Last Year: No     family history includes Asthma in his son; Hypertension in his mother; Stroke in his brother.    Review of Systems   Constitutional:  Negative for chills and fever.   HENT:  Negative for ear pain and sore throat.    Respiratory:  Negative for cough.    Cardiovascular:  Negative for chest pain.   Gastrointestinal:  Negative for abdominal pain and blood in stool.   Genitourinary:  Negative for dysuria and hematuria.   Neurological:  Negative for seizures and syncope.     Objective:   /80 (BP Location: Left arm, Patient Position: Sitting, BP Method: Large (Manual))   Pulse 71   Temp 97.4 °F (36.3 °C) (Tympanic)   Ht 5' 7" (1.702 m)   Wt 88.2 kg (194 lb 7.1 oz)   SpO2 98%   BMI 30.45 kg/m²     Physical Exam  Constitutional:       General: He is not in acute distress.     Appearance: He is well-developed.   HENT:      Head: Normocephalic and atraumatic.   Eyes:      Extraocular Movements: Extraocular movements intact.   Neck:      Thyroid: No thyromegaly.   Cardiovascular:      Rate and Rhythm: Normal rate and regular rhythm.   Pulmonary:      Breath sounds: Normal breath sounds. No " wheezing or rales.   Abdominal:      General: Bowel sounds are normal.      Palpations: Abdomen is soft.      Tenderness: There is no abdominal tenderness.   Musculoskeletal:         General: No swelling.      Cervical back: Neck supple. No rigidity.   Lymphadenopathy:      Cervical: No cervical adenopathy.   Skin:     General: Skin is warm and dry.   Neurological:      Mental Status: He is alert and oriented to person, place, and time.   Psychiatric:         Behavior: Behavior normal.         Lab Results   Component Value Date    WBC 6.51 05/30/2024    HGB 16.1 05/30/2024    HGB 15.9 08/21/2020    HGB 15.3 09/05/2018    HCT 48.7 05/30/2024    MCV 93 05/30/2024    MCV 93 08/21/2020    MCV 90 09/05/2018     05/30/2024    CHOL 178 05/30/2024    TRIG 194 (H) 05/30/2024    HDL 42 05/30/2024    LDLCALC 97.2 05/30/2024    LDLCALC 53.4 (L) 03/06/2024    LDLCALC 86.0 06/03/2022    ALT 34 05/30/2024    AST 22 05/30/2024     05/30/2024    K 4.9 05/30/2024    CALCIUM 10.0 05/30/2024     05/30/2024    CO2 23 05/30/2024    BUN 15 05/30/2024    CREATININE 1.0 05/30/2024    CREATININE 0.9 07/14/2023    CREATININE 1.0 08/21/2020    EGFRNORACEVR >60.0 05/30/2024    EGFRNORACEVR >60.0 07/14/2023    TSH 2.703 05/30/2024    TSH 1.293 07/14/2023    TSH 1.852 08/21/2020    PSA 0.73 05/30/2024    PSA 0.77 01/18/2016     (H) 05/30/2024    HGBA1C 6.9 (H) 05/30/2024    HGBA1C 6.6 (H) 07/14/2023    [id number] 23 (L) 01/18/2016    TOTALTESTOST 241 (L) 08/21/2020    BNP 85 04/01/2016          The 10-year ASCVD risk score (Michael MARTÍNEZ, et al., 2019) is: 12.8%    Values used to calculate the score:      Age: 53 years      Sex: Male      Is Non- : No      Diabetic: Yes      Tobacco smoker: No      Systolic Blood Pressure: 130 mmHg      Is BP treated: Yes      HDL Cholesterol: 42 mg/dL      Total Cholesterol: 178 mg/dL     Assessment:     1. Routine general medical examination at a [facility]  facility    2. Hyperlipidemia associated with type 2 diabetes mellitus    3. Hypertension associated with diabetes    4. PAF (paroxysmal atrial fibrillation)    5. Type 2 diabetes mellitus with other specified complication, without long-term current use of insulin    6. Essential hypertension    7. Need for Streptococcus pneumoniae vaccination    8. Microalbuminuria      Plan:   1. Routine general medical examination at a health care facility  Heart healthy diet, regular exercise, and regular use of sunscreen.   HM reviewed    2. Hyperlipidemia associated with type 2 diabetes mellitus  Overview:  Fatigue with Crestor and atorvastatin.      Assessment & Plan:  Not at goal.  Try pravastatin.    Orders:  -     pravastatin (PRAVACHOL) 20 MG tablet; Take 1 tablet (20 mg total) by mouth once daily.  Dispense: 90 tablet; Refill: 3  -     Lipid Panel; Future; Expected date: 12/03/2024  -     ALT (SGPT); Future; Expected date: 12/06/2024    3. Hypertension associated with diabetes  Overview:  Controlled.  Continue current medication      4. PAF (paroxysmal atrial fibrillation)  Overview:  Rate controlled.  Continue current medications and recommendations as per Cardiology      5. Type 2 diabetes mellitus with other specified complication, without long-term current use of insulin  Overview:  Diagnosed with A1c of 6.6 in 2023.      Orders:  -     Ambulatory referral/consult to Diabetes Education; Future; Expected date: 06/13/2024  -     Ambulatory referral/consult to Optometry; Future; Expected date: 06/13/2024  -     Hemoglobin A1C; Future; Expected date: 12/03/2024    6. Essential hypertension  -     valsartan (DIOVAN) 160 MG tablet; Take 1 tablet (160 mg total) by mouth once daily.  Dispense: 90 tablet; Refill: 1    7. Need for Streptococcus pneumoniae vaccination  -     pneumoc 20-massiel conj-dip cr(PF) (PREVNAR-20 (PF)) injection Syrg 0.5 mL    8. Microalbuminuria  Assessment & Plan:  Increase valsartan to 160  mg.          There are no Patient Instructions on file for this visit.    Future Appointments   Date Time Provider Department Center   6/7/2024  1:20 PM Amado Lopez OD [facility] [place]   11/29/2024  7:35 AM LABORATORY, [facility] [place]   12/6/2024  8:00 AM Fadi Damico MD [facility]       Lab Frequency Next Occurrence   Ambulatory referral/consult to Diabetes Education Once 12/13/2023       Follow up in about 6 months (around 12/6/2024), or if symptoms worsen or fail to improve.

## 2024-06-14 LAB
LEFT EYE DM RETINOPATHY: NEGATIVE
RIGHT EYE DM RETINOPATHY: NEGATIVE

## 2024-06-24 ENCOUNTER — PATIENT OUTREACH (OUTPATIENT)
Dept: ADMINISTRATIVE | Facility: HOSPITAL | Age: 54
End: 2024-06-24
Payer: COMMERCIAL

## 2024-07-03 NOTE — TELEPHONE ENCOUNTER
1st surveillance program enrollment attempt.  Pt declined to enroll in surveillance program stating [place] [facility] [place].  Tasks removed.    Reason for Disposition   General information question, no triage required and triager able to answer question    Additional Information   Negative: [1] Caller is not with the adult (patient) AND [2] reporting urgent symptoms   Negative: Lab result questions   Negative: Medication questions   Negative: Caller can't be reached by phone   Negative: Caller has already spoken to PCP or another triager   Negative: RN needs further essential information from caller in order to complete triage   Negative: Requesting regular office appointment   Negative: [1] Caller requesting NON-URGENT health information AND [2] PCP's office is the best resource   Negative: Health Information question, no triage required and triager able to answer question    Protocols used: INFORMATION ONLY CALL - NO TRIAGE-A-      
General Sunscreen Counseling: I recommended a broad spectrum sunscreen with a SPF of 30 or higher.  I explained that SPF 30 sunscreens block approximately 97 percent of the sun's harmful rays.  Sunscreens should be applied at least 15 minutes prior to expected sun exposure and then every 2 hours after that as long as sun exposure continues. If swimming or exercising sunscreen should be reapplied every 45 minutes to an hour after getting wet or sweating.  One ounce, or the equivalent of a shot glass full of sunscreen, is adequate to protect the skin not covered by a bathing suit. I also recommended a lip balm with a sunscreen as well. Sun protective clothing can be used in lieu of sunscreen but must be worn the entire time you are exposed to the sun's rays.
Detail Level: Detailed

## 2025-01-08 ENCOUNTER — PATIENT OUTREACH (OUTPATIENT)
Dept: ADMINISTRATIVE | Facility: HOSPITAL | Age: 55
End: 2025-01-08
Payer: COMMERCIAL

## 2025-01-10 ENCOUNTER — LAB VISIT (OUTPATIENT)
Dept: LAB | Facility: HOSPITAL | Age: 55
End: 2025-01-10
Attending: INTERNAL MEDICINE
Payer: COMMERCIAL

## 2025-01-10 DIAGNOSIS — E11.69 TYPE 2 DIABETES MELLITUS WITH OTHER SPECIFIED COMPLICATION, WITHOUT LONG-TERM CURRENT USE OF INSULIN: ICD-10-CM

## 2025-01-10 DIAGNOSIS — E78.5 HYPERLIPIDEMIA ASSOCIATED WITH TYPE 2 DIABETES MELLITUS: ICD-10-CM

## 2025-01-10 DIAGNOSIS — E11.69 HYPERLIPIDEMIA ASSOCIATED WITH TYPE 2 DIABETES MELLITUS: ICD-10-CM

## 2025-01-10 LAB
ALT SERPL W/O P-5'-P-CCNC: 33 U/L (ref 10–44)
CHOLEST SERPL-MCNC: 121 MG/DL (ref 120–199)
CHOLEST/HDLC SERPL: 2.8 {RATIO} (ref 2–5)
ESTIMATED AVG GLUCOSE: 148 MG/DL (ref 68–131)
HBA1C MFR BLD: 6.8 % (ref 4–5.6)
HDLC SERPL-MCNC: 43 MG/DL (ref 40–75)
HDLC SERPL: 35.5 % (ref 20–50)
LDLC SERPL CALC-MCNC: 47 MG/DL (ref 63–159)
NONHDLC SERPL-MCNC: 78 MG/DL
TRIGL SERPL-MCNC: 155 MG/DL (ref 30–150)

## 2025-01-10 PROCEDURE — 80061 LIPID PANEL: CPT | Performed by: INTERNAL MEDICINE

## 2025-01-10 PROCEDURE — 83036 HEMOGLOBIN GLYCOSYLATED A1C: CPT | Performed by: INTERNAL MEDICINE

## 2025-01-10 PROCEDURE — 84460 ALANINE AMINO (ALT) (SGPT): CPT | Performed by: INTERNAL MEDICINE

## 2025-01-10 PROCEDURE — 36415 COLL VENOUS BLD VENIPUNCTURE: CPT | Performed by: INTERNAL MEDICINE

## 2025-03-14 DIAGNOSIS — I10 ESSENTIAL HYPERTENSION: ICD-10-CM

## 2025-03-14 RX ORDER — VALSARTAN 160 MG/1
160 TABLET ORAL DAILY
Qty: 90 TABLET | Refills: 0 | Status: SHIPPED | OUTPATIENT
Start: 2025-03-14

## 2025-03-14 NOTE — TELEPHONE ENCOUNTER
Care Due:                  Date            Visit Type   Department     Provider  --------------------------------------------------------------------------------                                EP -                              PRIMARY      [facility] INTERNAL  Last Visit: 06-      CARE ([facility])   ENDY Damico                              EP -                              PRIMARY      [facility] INTERNAL  Next Visit: 04-      CARE ([facility])   ENDY Damico                                                            Last  Test          Frequency    Reason                     Performed    Due Date  --------------------------------------------------------------------------------    CMP.........  12 months..  pravastatin, valsartan...  05- 05-    [facility] Embedded Care Due Messages. Reference number: [id number].   3/14/2025 10:52:29 AM CDT

## 2025-03-15 ENCOUNTER — OFFICE VISIT (OUTPATIENT)
Dept: URGENT CARE | Facility: CLINIC | Age: 55
End: 2025-03-15
Payer: COMMERCIAL

## 2025-03-15 VITALS
BODY MASS INDEX: 31.39 KG/M2 | OXYGEN SATURATION: 98 % | HEIGHT: 66 IN | HEART RATE: 87 BPM | DIASTOLIC BLOOD PRESSURE: 87 MMHG | TEMPERATURE: 98 F | SYSTOLIC BLOOD PRESSURE: 131 MMHG | RESPIRATION RATE: 16 BRPM | WEIGHT: 195.31 LBS

## 2025-03-15 DIAGNOSIS — R19.7 DIARRHEA, UNSPECIFIED TYPE: ICD-10-CM

## 2025-03-15 DIAGNOSIS — R14.0 ABDOMINAL BLOATING: Primary | ICD-10-CM

## 2025-03-15 DIAGNOSIS — R14.3 EXCESSIVE GAS: ICD-10-CM

## 2025-03-15 PROCEDURE — 99213 OFFICE O/P EST LOW 20 MIN: CPT | Mod: S$GLB,,,

## 2025-03-15 NOTE — PATIENT INSTRUCTIONS
Gas and Bloating    Where does the gas that we belch or burp come from?  The gas brought back by belching comes entirely from swallowed air. We all swallow some air when eating food and drinking liquids. Most of the gas mixes with the stomach content and either enters into the small intestine or is belched back. The air that enters the small intestine is either absorbed or it may continue through to the large intestine and is then passed rectally. Individuals may swallow more air (and thus increase stomach gas) if they have a post-nasal drip, chew gum, have poorly fitting dentures, suck on hard candies or smoke tobacco. Drinking carbonated beverages (soda or beer) or eating rapidly can also increase stomach gas.    What foods cause increased flatus passage?  The food we choose to eat can influence the amount of gas passed rectally. Although most of our food intake is absorbed in the small intestine, some foods, such as cauliflower, broccoli, cabbage, baked beans, and bran are incompletely digested. They are then broken down by bacteria in the large intestine, causing the formation of gas. A high roughage diet is important to promote bowel regularity, but excessive roughage or fiber may lead to bloating and increased flatulence. When increasing the amount of fiber in your diet, do so gradually, allowing your intestinal tract time to adjust. Milk sugar (lactose) requires an intestinal enzyme (lactase) for digestion. When individuals lack this enzyme the lactose in milk and other dairy products enters the large intestine where the lactose is broken down by bacteria, producing gas. Although milk is an excellent source of protein and calcium, many adults experience abdominal bloating, gas and diarrhea after consuming milk sugar. Persons from [place] are often extremely intolerant to the smallest quantity of dairy products. Everyone passes some rectal gas, although the volume of gas is different  each day. Much of the flatus comes from the nitrogen found in the air one swallows. The remainder of the flatus volume is the result of carbohydrates which are not absorbed in the small intestine and are broken down by bacteria in the large intestine. Therefore, the amount of flatus represents a combination of swallowed air and poorly absorbed carbohydrates. The unpleasant order of flatus is due to other gases, such as hydrogen sulfide, which is produced by the bacteria.    How can the volume of flatus be reduced?  In addition to the gas-forming foods cited above, some diet chewing gum and hard candies use sorbitol or fructose as sweeteners. These sugars can lead to excess gas production and should be avoided. Elimination of dairy products or the use of lactase-added milk can be helpful for those with lactase deficiency.    Where do I feel gas pains?  Individuals with irritable bowel problems (crampy pain and/or bowel irregularity) are often sensitive to excess intestinal gas. A common symptom is generalized abdominal cramping, sometimes relieved by passing flatus. If the gas accumulates in the right upper abdomen, the pain may radiate up intconfused with gallbladder disease. If the gas accumulates in the left upper abdomen, the pain may radiate into the left side of the chest and could mimic heart disease. If gas accumulates in the stomach, the upper abdominal pressure pain could radiate up to the lower chest and raise concern about a heart disorder.    Take over the counter Gas-X for bloating and abdominal discomfort      10 Steps to Decrease Symptoms of Intestinal Gas  1. Develop a regular routine of diet, exercise, and rest.  2. Correct bad habits:  Chew food thoroughly  Eat slowly and leisurely in a quiet atmosphere  Avoid washing solids down with a beverage  Avoid gulping and sipping liquids  Avoid drinking out of small mouthed bottles or straws  Avoid drinking from water fountains  Avoid carbonated  beverages.sodas and beer  Eliminate pipe, cigar, and cigarette smoking  Avoid gum chewing and sucking hard candy  Check dentures for proper fit  Attempt to be aware of and avoid deep sighing  3. Do not attempt to induce belching.  4. Do not overload the stomach at any one meal.  5. Avoid gaseous vegetables: navy beans, cabbage, brussel sprouts, cauliflower, broccoli, turnips, cucumbers, radishes,  onions, melons, and excess raw fruit and vegetables.  6. Avoid foods with air whipped into them.souffles, sponge cake, milk shakes.  7. Avoid long-term or frequent intermittent use of medications intended for relief of cold symptoms. cough, nasal congestion, post nasal discharge.  8. Avoid tight fitting garments, girdles, and belts.  9. Do not lie down or sit in a slumped position immediately after eating.  10. Take a leisurely stroll after meals.

## 2025-03-15 NOTE — PROGRESS NOTES
"Subjective:      Patient ID: Renzo Salgado is a 54 y.o. male.    Vitals:  height is 5' 6.46" (1.688 m) and weight is 88.6 kg (195 lb 5.2 oz). His oral temperature is 98.4 °F (36.9 °C). His blood pressure is 131/87 and his pulse is 87. His respiration is 16 and oxygen saturation is 98%.     Chief Complaint: Diarrhea (/)    54-year-old male with past medical history of type 2 diabetes, hyperlipidemia, hypertension, obstructive sleep apnea, presents to the clinic with complaints of improving watery diarrhea (x1 episode today) and excessive belching, bloating and passing gas.  States it started yesterday morning around 3:00 a.m..  States he believes the culprit was eating hard boiled eggs the previous night.  States this morning he also had some scrambled eggs and sausage.  Patient has tried Pepto-Bismol for symptoms.  He states years ago he used to take medicine for heartburn but does not take it anymore.  Denies any previous abdominal surgeries.  Denies any fever, nausea/vomiting, bloody stools, urinary symptoms.  Allergic to atorvastatin, Crestor, milk containing products, and shellfish containing products.    Diarrhea   This is a new problem. The current episode started yesterday. The problem occurs 2 to 4 times per day. The problem has been unchanged. The patient states that diarrhea awakens him from sleep. Associated symptoms include abdominal pain and bloating. Pertinent negatives include no chills, coughing, fever or vomiting. Risk factors include suspect food intake. He has tried anti-motility drug for the symptoms. The treatment provided no relief.       Constitution: Negative for chills, sweating and fever.   Cardiovascular:  Negative for chest pain.   Respiratory:  Positive for sleep apnea. Negative for cough and shortness of breath.    Gastrointestinal:  Positive for abdominal pain and diarrhea. Negative for history of abdominal surgery, nausea, vomiting, constipation, bright red blood in stool, dark " colored stools, rectal bleeding, rectal pain and heartburn.   Genitourinary:  Negative for dysuria, frequency, urgency and flank pain.   Musculoskeletal:  Negative for back pain.   Skin:  Negative for rash.      Objective:     Vitals:    03/15/25 1635   BP: 131/87   Pulse: 87   Resp: 16   Temp: 98.4 °F (36.9 °C)       Physical Exam   Constitutional: He is oriented to person, place, and time. He appears well-developed.  Non-toxic appearance. He does not appear ill. No distress.   HENT:   Head: Normocephalic and atraumatic.   Ears:   Right Ear: External ear normal.   Left Ear: External ear normal.   Nose: Nose normal.   Mouth/Throat: Mucous membranes are normal.   Eyes: Conjunctivae and lids are normal.   Neck: Trachea normal. Neck supple.   Cardiovascular: Normal rate, regular rhythm, normal heart sounds and normal pulses.   Pulmonary/Chest: Effort normal and breath sounds normal. No accessory muscle usage or stridor. No tachypnea. No respiratory distress. He has no wheezes. He has no rhonchi. He has no rales.   Abdominal: Normal appearance and bowel sounds are normal. He exhibits no distension and no mass. Soft. There is no abdominal tenderness (none with deep palpation). There is no rebound, no guarding, no tenderness at McBurney's point, no left CVA tenderness, negative Rovsing's sign and no right CVA tenderness.      Comments: No guarding on exam, normal bowel sounds, no radiating pain, afebrile.  Low suspicion for pancreatitis, nephrolithiasis, appendicitis.  Patient resting comfortably in exam room in no acute distress.  Low suspicion for obstruction.   Musculoskeletal: Normal range of motion.         General: Normal range of motion.   Neurological: He is alert and oriented to person, place, and time. He has normal strength. Gait normal.   Skin: Skin is warm, dry, intact, not diaphoretic, not pale and no rash.   Psychiatric: His speech is normal and behavior is normal. Judgment and thought content normal.    Nursing note and vitals reviewed.      Assessment:     1. Abdominal bloating    2. Excessive gas    3. Diarrhea, unspecified type        Plan:       Abdominal bloating    Excessive gas    Diarrhea, unspecified type        Patient Instructions                   Gas and Bloating    Where does the gas that we belch or burp come from?  The gas brought back by belching comes entirely from swallowed air. We all swallow some air when eating food and drinking liquids. Most of the gas mixes with the stomach content and either enters into the small intestine or is belched back. The air that enters the small intestine is either absorbed or it may continue through to the large intestine and is then passed rectally. Individuals may swallow more air (and thus increase stomach gas) if they have a post-nasal drip, chew gum, have poorly fitting dentures, suck on hard candies or smoke tobacco. Drinking carbonated beverages (soda or beer) or eating rapidly can also increase stomach gas.    What foods cause increased flatus passage?  The food we choose to eat can influence the amount of gas passed rectally. Although most of our food intake is absorbed in the small intestine, some foods, such as cauliflower, broccoli, cabbage, baked beans, and bran are incompletely digested. They are then broken down by bacteria in the large intestine, causing the formation of gas. A high roughage diet is important to promote bowel regularity, but excessive roughage or fiber may lead to bloating and increased flatulence. When increasing the amount of fiber in your diet, do so gradually, allowing your intestinal tract time to adjust. Milk sugar (lactose) requires an intestinal enzyme (lactase) for digestion. When individuals lack this enzyme the lactose in milk and other dairy products enters the large intestine where the lactose is broken down by bacteria, producing gas. Although milk is an excellent source of protein and calcium, many adults  experience abdominal bloating, gas and diarrhea after consuming milk sugar. Persons from [place] are often extremely intolerant to the smallest quantity of dairy products. Everyone passes some rectal gas, although the volume of gas is different each day. Much of the flatus comes from the nitrogen found in the air one swallows. The remainder of the flatus volume is the result of carbohydrates which are not absorbed in the small intestine and are broken down by bacteria in the large intestine. Therefore, the amount of flatus represents a combination of swallowed air and poorly absorbed carbohydrates. The unpleasant order of flatus is due to other gases, such as hydrogen sulfide, which is produced by the bacteria.    How can the volume of flatus be reduced?  In addition to the gas-forming foods cited above, some diet chewing gum and hard candies use sorbitol or fructose as sweeteners. These sugars can lead to excess gas production and should be avoided. Elimination of dairy products or the use of lactase-added milk can be helpful for those with lactase deficiency.    Where do I feel gas pains?  Individuals with irritable bowel problems (crampy pain and/or bowel irregularity) are often sensitive to excess intestinal gas. A common symptom is generalized abdominal cramping, sometimes relieved by passing flatus. If the gas accumulates in the right upper abdomen, the pain may radiate up intconfused with gallbladder disease. If the gas accumulates in the left upper abdomen, the pain may radiate into the left side of the chest and could mimic heart disease. If gas accumulates in the stomach, the upper abdominal pressure pain could radiate up to the lower chest and raise concern about a heart disorder.    Take over the counter Gas-X for bloating and abdominal discomfort      10 Steps to Decrease Symptoms of Intestinal Gas  1. Develop a regular routine of diet, exercise, and rest.  2. Correct bad habits:  Chew food  thoroughly  Eat slowly and leisurely in a quiet atmosphere  Avoid washing solids down with a beverage  Avoid gulping and sipping liquids  Avoid drinking out of small mouthed bottles or straws  Avoid drinking from water fountains  Avoid carbonated beverages.sodas and beer  Eliminate pipe, cigar, and cigarette smoking  Avoid gum chewing and sucking hard candy  Check dentures for proper fit  Attempt to be aware of and avoid deep sighing  3. Do not attempt to induce belching.  4. Do not overload the stomach at any one meal.  5. Avoid gaseous vegetables: navy beans, cabbage, brussel sprouts, cauliflower, broccoli, turnips, cucumbers, radishes,  onions, melons, and excess raw fruit and vegetables.  6. Avoid foods with air whipped into them.souffles, sponge cake, milk shakes.  7. Avoid long-term or frequent intermittent use of medications intended for relief of cold symptoms. cough, nasal congestion, post nasal discharge.  8. Avoid tight fitting garments, girdles, and belts.  9. Do not lie down or sit in a slumped position immediately after eating.  10. Take a leisurely stroll after meals.

## 2025-04-04 ENCOUNTER — RESULTS FOLLOW-UP (OUTPATIENT)
Dept: INTERNAL MEDICINE | Facility: CLINIC | Age: 55
End: 2025-04-04

## 2025-04-04 ENCOUNTER — OFFICE VISIT (OUTPATIENT)
Dept: INTERNAL MEDICINE | Facility: CLINIC | Age: 55
End: 2025-04-04
Payer: COMMERCIAL

## 2025-04-04 ENCOUNTER — HOSPITAL ENCOUNTER (OUTPATIENT)
Dept: RADIOLOGY | Facility: HOSPITAL | Age: 55
Discharge: HOME | End: 2025-04-04
Attending: INTERNAL MEDICINE
Payer: COMMERCIAL

## 2025-04-04 VITALS
DIASTOLIC BLOOD PRESSURE: 64 MMHG | HEART RATE: 58 BPM | OXYGEN SATURATION: 97 % | BODY MASS INDEX: 30.65 KG/M2 | SYSTOLIC BLOOD PRESSURE: 126 MMHG | TEMPERATURE: 98 F | WEIGHT: 190.69 LBS | HEIGHT: 66 IN

## 2025-04-04 DIAGNOSIS — M79.672 LEFT FOOT PAIN: ICD-10-CM

## 2025-04-04 DIAGNOSIS — E11.69 HYPERLIPIDEMIA ASSOCIATED WITH TYPE 2 DIABETES MELLITUS: ICD-10-CM

## 2025-04-04 DIAGNOSIS — Z00.00 PREVENTATIVE HEALTH CARE: ICD-10-CM

## 2025-04-04 DIAGNOSIS — E11.59 HYPERTENSION ASSOCIATED WITH DIABETES: Primary | ICD-10-CM

## 2025-04-04 DIAGNOSIS — I15.2 HYPERTENSION ASSOCIATED WITH DIABETES: Primary | ICD-10-CM

## 2025-04-04 DIAGNOSIS — E78.5 HYPERLIPIDEMIA ASSOCIATED WITH TYPE 2 DIABETES MELLITUS: ICD-10-CM

## 2025-04-04 DIAGNOSIS — I48.0 PAROXYSMAL ATRIAL FIBRILLATION: ICD-10-CM

## 2025-04-04 DIAGNOSIS — I10 ESSENTIAL HYPERTENSION: ICD-10-CM

## 2025-04-04 DIAGNOSIS — E11.69 TYPE 2 DIABETES MELLITUS WITH OTHER SPECIFIED COMPLICATION, WITHOUT LONG-TERM CURRENT USE OF INSULIN: ICD-10-CM

## 2025-04-04 PROCEDURE — 3078F DIAST BP <80 MM HG: CPT | Mod: CPTII,S$GLB,, | Performed by: INTERNAL MEDICINE

## 2025-04-04 PROCEDURE — 99214 OFFICE O/P EST MOD 30 MIN: CPT | Mod: S$GLB,,, | Performed by: INTERNAL MEDICINE

## 2025-04-04 PROCEDURE — G2211 COMPLEX E/M VISIT ADD ON: HCPCS | Mod: S$GLB,,, | Performed by: INTERNAL MEDICINE

## 2025-04-04 PROCEDURE — 3044F HG A1C LEVEL LT 7.0%: CPT | Mod: CPTII,S$GLB,, | Performed by: INTERNAL MEDICINE

## 2025-04-04 PROCEDURE — 3074F SYST BP LT 130 MM HG: CPT | Mod: CPTII,S$GLB,, | Performed by: INTERNAL MEDICINE

## 2025-04-04 PROCEDURE — 1159F MED LIST DOCD IN RCRD: CPT | Mod: CPTII,S$GLB,, | Performed by: INTERNAL MEDICINE

## 2025-04-04 PROCEDURE — 73630 X-RAY EXAM OF FOOT: CPT | Mod: TC,LT

## 2025-04-04 PROCEDURE — 3008F BODY MASS INDEX DOCD: CPT | Mod: CPTII,S$GLB,, | Performed by: INTERNAL MEDICINE

## 2025-04-04 PROCEDURE — 99999 PR PBB SHADOW E&M-EST. PATIENT-LVL IV: CPT | Mod: PBBFAC,,, | Performed by: INTERNAL MEDICINE

## 2025-04-04 PROCEDURE — 4010F ACE/ARB THERAPY RXD/TAKEN: CPT | Mod: CPTII,S$GLB,, | Performed by: INTERNAL MEDICINE

## 2025-04-04 PROCEDURE — 73630 X-RAY EXAM OF FOOT: CPT | Mod: 26,LT,, | Performed by: RADIOLOGY

## 2025-04-04 RX ORDER — NAPROXEN 500 MG/1
500 TABLET ORAL 2 TIMES DAILY WITH MEALS
Qty: 10 TABLET | Refills: 0 | Status: SHIPPED | OUTPATIENT
Start: 2025-04-04 | End: 2025-04-09

## 2025-04-04 RX ORDER — VALSARTAN 160 MG/1
160 TABLET ORAL DAILY
Qty: 90 TABLET | Refills: 2 | Status: SHIPPED | OUTPATIENT
Start: 2025-04-04

## 2025-04-04 RX ORDER — PRAVASTATIN SODIUM 20 MG/1
20 TABLET ORAL DAILY
Qty: 90 TABLET | Refills: 3 | Status: SHIPPED | OUTPATIENT
Start: 2025-04-04 | End: 2026-04-04

## 2025-04-04 NOTE — PATIENT INSTRUCTIONS
Check with your pharmacy regarding  shingles vaccine.      No ibuprofen, aleve, motrin, naprosyn, advil, or other NSAIDs while taking naproxen    Tylenol 500 to 1000 mg every 8 hours as needed for pain.

## 2025-04-04 NOTE — PROGRESS NOTES
"Subjective:      Patient ID: Renzo Salgado is a 54 y.o. male.    Chief Complaint: Follow-up    Follow-up  Pertinent negatives include no abdominal pain, chest pain, chills, coughing, fever, numbness or sore throat.   Foot Injury   Incident onset: 1 week. There was no injury mechanism. The pain is present in the left foot. The quality of the pain is described as aching. The pain is mild. The pain has been Fluctuating since onset. Pertinent negatives include no inability to bear weight, loss of motion, loss of sensation, muscle weakness, numbness or tingling. He reports no foreign bodies present. Exacerbated by: walking. He has tried rest (solon pas) for the symptoms. The treatment provided moderate relief.     History of Present Illness               53 yo with Problem List[1]  Past Medical History:   Diagnosis Date    Atrial fibrillation     COVID 5/7/2022    Hypertension     Other hyperlipidemia 6/16/2021    Sleep apnea      Here today for management of mult med problems as outlined below.  Compliant with meds without significant side effects. Energy and appetite good.     Pt also c/o foot pain.    Review of Systems   Constitutional:  Negative for chills and fever.   HENT:  Negative for ear pain and sore throat.    Respiratory:  Negative for cough.    Cardiovascular:  Negative for chest pain.   Gastrointestinal:  Negative for abdominal pain and blood in stool.   Genitourinary:  Negative for dysuria and hematuria.   Neurological:  Negative for tingling, seizures, syncope and numbness.     Objective:   /64 (BP Location: Right arm, Patient Position: Sitting)   Pulse (!) 58   Temp 98 °F (36.7 °C)   Ht 5' 6.46" (1.688 m)   Wt 86.5 kg (190 lb 11.2 oz)   SpO2 97%   BMI 30.35 kg/m²     Physical Exam  Constitutional:       General: He is awake. He is not in acute distress.     Appearance: Normal appearance. He is well-developed.   HENT:      Head: Normocephalic and atraumatic.   Eyes:      " Conjunctiva/sclera: Conjunctivae normal.   Cardiovascular:      Rate and Rhythm: Normal rate.   Pulmonary:      Effort: Pulmonary effort is normal.      Breath sounds: Normal breath sounds.   Musculoskeletal:      Cervical back: Normal range of motion.      Left foot: Normal range of motion and normal capillary refill. Swelling present. No foot drop, laceration, tenderness, bony tenderness or crepitus. Normal pulse.   Skin:     General: Skin is warm and dry.   Neurological:      Mental Status: He is alert. Mental status is at baseline.   Psychiatric:         Mood and Affect: Mood normal.         Behavior: Behavior normal. Behavior is cooperative.         Thought Content: Thought content normal.         Judgment: Judgment normal.         Lab Results   Component Value Date    WBC 6.51 05/30/2024    HGB 16.1 05/30/2024    HGB 15.9 08/21/2020    HGB 15.3 09/05/2018    HCT 48.7 05/30/2024    MCV 93 05/30/2024    MCV 93 08/21/2020    MCV 90 09/05/2018     05/30/2024    CHOL 121 01/10/2025    TRIG 155 (H) 01/10/2025    HDL 43 01/10/2025    LDLCALC 47.0 (L) 01/10/2025    LDLCALC 97.2 05/30/2024    LDLCALC 53.4 (L) 03/06/2024    ALT 33 01/10/2025    AST 22 05/30/2024     05/30/2024    K 4.9 05/30/2024    CALCIUM 10.0 05/30/2024     05/30/2024    CO2 23 05/30/2024    BUN 15 05/30/2024    CREATININE 1.0 05/30/2024    CREATININE 0.9 07/14/2023    CREATININE 1.0 08/21/2020    EGFRNORACEVR >60.0 05/30/2024    EGFRNORACEVR >60.0 07/14/2023    TSH 2.703 05/30/2024    TSH 1.293 07/14/2023    TSH 1.852 08/21/2020    PSA 0.73 05/30/2024    PSA 0.77 01/18/2016     (H) 05/30/2024    HGBA1C 6.8 (H) 01/10/2025    HGBA1C 6.9 (H) 05/30/2024    HGBA1C 6.6 (H) 07/14/2023    [id number] 23 (L) 01/18/2016    TOTALTESTOST 241 (L) 08/21/2020    BNP 85 04/01/2016          The ASCVD Risk score (Michael DK, et al., 2019) failed to calculate for the following reasons:    The valid total cholesterol range is 130 to 320 mg/dL      Assessment:     1. Hypertension associated with diabetes    2. Hyperlipidemia associated with type 2 diabetes mellitus    3. Type 2 diabetes mellitus with other specified complication, without long-term current use of insulin    4. Preventative health care    5. Paroxysmal atrial fibrillation    6. Essential hypertension    7. Left foot pain      Plan:   1. Hypertension associated with diabetes  Overview:  Controlled.  Continue current medication      2. Hyperlipidemia associated with type 2 diabetes mellitus  Overview:  Fatigue with Crestor and atorvastatin.    Stable.   Orders:  -     pravastatin (PRAVACHOL) 20 MG tablet; Take 1 tablet (20 mg total) by mouth once daily.  Dispense: 90 tablet; Refill: 3    3. Type 2 diabetes mellitus with other specified complication, without long-term current use of insulin  Overview:  Diagnosed with A1c of 6.6 in 2023.  Controlled. Cont current meds.         4. Preventative health care  -     Hemoglobin A1C; Future; Expected date: 07/04/2025  -     CBC Auto Differential; Future; Expected date: 07/04/2025  -     Comprehensive Metabolic Panel; Future; Expected date: 07/04/2025  -     TSH; Future; Expected date: 07/04/2025  -     PSA, Screening; Future; Expected date: 07/04/2025  -     Microalbumin/Creatinine Ratio, Urine; Future; Expected date: 07/04/2025    5. Paroxysmal atrial fibrillation  Rate controlled. Cont recs and f/u as per cards.     6. Essential hypertension  Controlled. Cont current meds.     -     valsartan (DIOVAN) 160 MG tablet; Take 1 tablet (160 mg total) by mouth once daily.  Dispense: 90 tablet; Refill: 2    7. Left foot pain  -     X-Ray Foot Complete Left; Future; Expected date: 04/04/2025  -     naproxen (NAPROSYN) 500 MG tablet; Take 1 tablet (500 mg total) by mouth 2 (two) times daily with meals. For pain and inflammation for 5 days  Dispense: 10 tablet; Refill: 0        Patient Instructions   Check with your pharmacy regarding  shingles vaccine.      No  ibuprofen, aleve, motrin, naprosyn, advil, or other NSAIDs while taking naproxen    Tylenol 500 to 1000 mg every 8 hours as needed for pain.      Future Appointments   Date Time Provider Department Center   7/11/2025  7:00 AM SPECIMEN, [facility]   7/11/2025  9:50 AM LABORATORY, [facility]       Lab Frequency Next Occurrence   Ambulatory referral/consult to Diabetes Education Once 06/13/2024   Hemoglobin A1C Once 07/04/2025   CBC Auto Differential Once 07/04/2025   Comprehensive Metabolic Panel Once 07/04/2025   TSH Once 07/04/2025   PSA, Screening Once 07/04/2025   Microalbumin/Creatinine Ratio, Urine Once 07/04/2025       Follow up in 3 months (on 7/4/2025), or if symptoms worsen or fail to improve.         Visit today included increased complexity  addressed and managing the longitudinal care of the patient due to the serious and/or complex managed problem(s)            [1]   Patient Active Problem List  Diagnosis    Hypertension associated with diabetes    Renal stone    Ureteral stone    KIKO (obstructive sleep apnea)    PAF (paroxysmal atrial fibrillation)    Lip swelling    Hyperlipidemia associated with type 2 diabetes mellitus    Obesity (BMI 30-39.9)    Type 2 diabetes mellitus, without long-term current use of insulin    Microalbuminuria

## 2025-04-18 DIAGNOSIS — I48.0 PAF (PAROXYSMAL ATRIAL FIBRILLATION): ICD-10-CM

## 2025-04-18 DIAGNOSIS — I48.0 PAROXYSMAL ATRIAL FIBRILLATION: ICD-10-CM

## 2025-04-21 ENCOUNTER — PATIENT MESSAGE (OUTPATIENT)
Dept: CARDIOLOGY | Facility: CLINIC | Age: 55
End: 2025-04-21
Payer: COMMERCIAL

## 2025-04-21 DIAGNOSIS — I48.0 PAROXYSMAL ATRIAL FIBRILLATION: ICD-10-CM

## 2025-04-21 DIAGNOSIS — I48.0 PAF (PAROXYSMAL ATRIAL FIBRILLATION): ICD-10-CM

## 2025-04-21 RX ORDER — METOPROLOL SUCCINATE 100 MG/1
100 TABLET, EXTENDED RELEASE ORAL 2 TIMES DAILY
Qty: 180 TABLET | Refills: 0 | Status: SHIPPED | OUTPATIENT
Start: 2025-04-21

## 2025-04-21 RX ORDER — METOPROLOL SUCCINATE 100 MG/1
100 TABLET, EXTENDED RELEASE ORAL 2 TIMES DAILY
Qty: 180 TABLET | Refills: 0 | OUTPATIENT
Start: 2025-04-21

## 2025-06-12 DIAGNOSIS — I15.2 HYPERTENSION ASSOCIATED WITH DIABETES: ICD-10-CM

## 2025-06-12 DIAGNOSIS — I48.0 PAF (PAROXYSMAL ATRIAL FIBRILLATION): Primary | ICD-10-CM

## 2025-06-12 DIAGNOSIS — E11.59 HYPERTENSION ASSOCIATED WITH DIABETES: ICD-10-CM

## 2025-06-13 ENCOUNTER — HOSPITAL ENCOUNTER (OUTPATIENT)
Dept: CARDIOLOGY | Facility: HOSPITAL | Age: 55
Discharge: HOME | End: 2025-06-13
Attending: INTERNAL MEDICINE
Payer: COMMERCIAL

## 2025-06-13 ENCOUNTER — OFFICE VISIT (OUTPATIENT)
Dept: CARDIOLOGY | Facility: CLINIC | Age: 55
End: 2025-06-13
Payer: COMMERCIAL

## 2025-06-13 VITALS
HEART RATE: 74 BPM | WEIGHT: 193.56 LBS | BODY MASS INDEX: 31.11 KG/M2 | SYSTOLIC BLOOD PRESSURE: 118 MMHG | HEIGHT: 66 IN | OXYGEN SATURATION: 97 % | DIASTOLIC BLOOD PRESSURE: 78 MMHG

## 2025-06-13 DIAGNOSIS — E78.49 OTHER HYPERLIPIDEMIA: ICD-10-CM

## 2025-06-13 DIAGNOSIS — I15.2 HYPERTENSION ASSOCIATED WITH DIABETES: ICD-10-CM

## 2025-06-13 DIAGNOSIS — I48.0 PAROXYSMAL ATRIAL FIBRILLATION: ICD-10-CM

## 2025-06-13 DIAGNOSIS — E11.59 HYPERTENSION ASSOCIATED WITH DIABETES: ICD-10-CM

## 2025-06-13 DIAGNOSIS — I48.0 PAF (PAROXYSMAL ATRIAL FIBRILLATION): Primary | ICD-10-CM

## 2025-06-13 DIAGNOSIS — H00.16 ACUTE CHALAZION OF LEFT EYE: ICD-10-CM

## 2025-06-13 DIAGNOSIS — I48.0 PAF (PAROXYSMAL ATRIAL FIBRILLATION): ICD-10-CM

## 2025-06-13 DIAGNOSIS — E11.69 TYPE 2 DIABETES MELLITUS WITH OTHER SPECIFIED COMPLICATION, WITHOUT LONG-TERM CURRENT USE OF INSULIN: ICD-10-CM

## 2025-06-13 DIAGNOSIS — G47.33 OSA (OBSTRUCTIVE SLEEP APNEA): ICD-10-CM

## 2025-06-13 DIAGNOSIS — I10 ESSENTIAL HYPERTENSION: ICD-10-CM

## 2025-06-13 LAB
OHS QRS DURATION: 92 MS
OHS QTC CALCULATION: 419 MS

## 2025-06-13 PROCEDURE — 99999 PR PBB SHADOW E&M-EST. PATIENT-LVL III: CPT | Mod: PBBFAC,,, | Performed by: INTERNAL MEDICINE

## 2025-06-13 PROCEDURE — 93005 ELECTROCARDIOGRAM TRACING: CPT

## 2025-06-13 PROCEDURE — 93010 ELECTROCARDIOGRAM REPORT: CPT | Mod: ,,, | Performed by: INTERNAL MEDICINE

## 2025-06-13 RX ORDER — METOPROLOL SUCCINATE 100 MG/1
100 TABLET, EXTENDED RELEASE ORAL 2 TIMES DAILY
Qty: 180 TABLET | Refills: 3 | Status: SHIPPED | OUTPATIENT
Start: 2025-06-13

## 2025-06-13 RX ORDER — DILTIAZEM HYDROCHLORIDE 120 MG/1
120 CAPSULE, COATED, EXTENDED RELEASE ORAL DAILY
Qty: 90 CAPSULE | Refills: 3 | Status: SHIPPED | OUTPATIENT
Start: 2025-06-13 | End: 2026-06-13

## 2025-06-13 NOTE — PROGRESS NOTES
Subjective:   Patient ID:  Renzo Salgado is a 54 y.o. male who presents for cardiac consult of No chief complaint on file.      The patient came in today for cardiac consult of No chief complaint on file.    Renzo Salgado is a 54 y.o. male  pt with PAFib on Eliquis, DM2, HTN, HLD,  KIKO, renal stones presents for follow up CV evaluation.     3/31/23  Bp and HR well controlled. BMI 30 - 196 lbs. He had COVID last May but nothing since then. He has enrolled in gym, does 40 min cardio and 30 -40 min weights.   ECG - AFib, V rate 72    4/5/24  BP and HR well controlled. BMI 31 - 199 lbs. Has DM2  - not on meds now  He is active, no CP/SOB.   Felt weak with statin - changed dose now.     6/13/25  BP and HR stable. Follow up from last year  His A1c is worse, has gaiend weight, is not on DM meds yet.   No CP/SOB. He is going to the gym.     ECG - [employer] RBBB    Patient has fairly good exercise tolerance. Works with  company.     Patient is compliant with medications.    2D ECHO  4/2016  CONCLUSIONS     1 - Concentric remodeling.     2 - Normal left ventricular systolic function (EF 55-60%).     3 - Normal left ventricular diastolic function.     4 - Normal right ventricular systolic function .     5 - The estimated PA systolic pressure is 31 mmHg.     6 - Mild tricuspid regurgitation.       RICARDO   CONCLUSIONS     1 - No visualized thrombus in the left atrium with spontaneous echo contrast.     2 - No visualized thrombus in the left atrial appendage.     3 - Normal left ventricular systolic function (EF 55-60%).     4 - Indeterminate LV diastolic function.     5 - Trivial to mild mitral regurgitation.     6 - Trivial tricuspid regurgitation.     This document has been electronically    SIGNED BY: Abundio Alvarado MD On: 10/05/2018 11:02            Past Medical History:   Diagnosis Date    Atrial fibrillation     Hypertension             Past Surgical History:   Procedure Laterality Date    CARDIOVERSION  N/A 10/05/2018    Procedure: CARDIOVERSION;  Surgeon: Abundio Alvarado MD;  Location: [facility] CATH LAB;  Service: Cardiology;  Laterality: N/A;  Anesthesia notified 9/24 gcd    CYSTOSCOPY W/ URETERAL STENT PLACEMENT Left 09/04/2018    Procedure: CYSTOSCOPY, WITH URETERAL STENT INSERTION;  Surgeon: Ricky Baker IV, MD;  Location: [facility] OR;  Service: Urology;  Laterality: Left;    LASER LITHOTRIPSY Left 09/04/2018    Procedure: LITHOTRIPSY, USING LASER;  Surgeon: Ricky Baker IV, MD;  Location: [facility] OR;  Service: Urology;  Laterality: Left;    UPPER GASTROINTESTINAL ENDOSCOPY         Social History     Tobacco Use    Smoking status: Never    Smokeless tobacco: Never   Substance Use Topics    Alcohol use: No    Drug use: No       Family History   Problem Relation Name Age of Onset    Hypertension Mother Analia Salgado     Asthma Son Kai Salgado     Stroke Brother Hunter Salgado     Melanoma Neg Hx      Psoriasis Neg Hx      Lupus Neg Hx         Patient's Medications   New Prescriptions    No medications on file   Previous Medications    CLOBETASOL (TEMOVATE) 0.05 % EXTERNAL SOLUTION    APPLY SMALL AMOUNT TOPICALLY TO THE SCALP EVERY NIGHT FOR 4 WEEKS THEN AS NEEDED FLARES    CLOTRIMAZOLE (LOTRIMIN) 1 % CREAM    Apply topically 2 (two) times daily.    PRAVASTATIN (PRAVACHOL) 20 MG TABLET    Take 1 tablet (20 mg total) by mouth once daily.    VALSARTAN (DIOVAN) 160 MG TABLET    Take 1 tablet (160 mg total) by mouth once daily.   Modified Medications    Modified Medication Previous Medication    APIXABAN (ELIQUIS) 5 MG TAB apixaban (ELIQUIS) 5 mg Tab       Take 1 tablet (5 mg total) by mouth 2 (two) times daily.    Take 1 tablet (5 mg total) by mouth 2 (two) times daily.    DILTIAZEM (CARDIZEM CD) 120 MG CP24 diltiaZEM (CARDIZEM CD) 120 MG Cp24       Take 1 capsule (120 mg total) by mouth once daily.    Take 1 capsule (120 mg total) by mouth once daily.    METOPROLOL SUCCINATE (TOPROL-XL) 100 MG 24 HR TABLET metoprolol  "succinate (TOPROL-XL) 100 MG 24 hr tablet       Take 1 tablet (100 mg total) by mouth 2 (two) times daily.    Take 1 tablet (100 mg total) by mouth 2 (two) times daily.   Discontinued Medications    No medications on file       Review of Systems   Constitutional: Negative.    HENT: Negative.     Eyes: Negative.    Respiratory: Negative.  Negative for shortness of breath.    Cardiovascular: Negative.  Negative for chest pain and palpitations.   Gastrointestinal: Negative.    Genitourinary:  Negative for hematuria.   Musculoskeletal: Negative.    Skin: Negative.    Neurological: Negative.    Endo/Heme/Allergies: Negative.    Psychiatric/Behavioral: Negative.     All 12 systems otherwise negative.      Wt Readings from Last 3 Encounters:   06/13/25 87.8 kg (193 lb 9 oz)   04/04/25 86.5 kg (190 lb 11.2 oz)   03/15/25 88.6 kg (195 lb 5.2 oz)     Temp Readings from Last 3 Encounters:   04/04/25 98 °F (36.7 °C)   03/15/25 98.4 °F (36.9 °C) (Oral)   06/06/24 97.4 °F (36.3 °C) (Tympanic)     BP Readings from Last 3 Encounters:   06/13/25 118/78   04/04/25 126/64   03/15/25 131/87     Pulse Readings from Last 3 Encounters:   06/13/25 74   04/04/25 (!) 58   03/15/25 87       /78 (BP Location: Left arm, Patient Position: Sitting)   Pulse 74   Ht 5' 6" (1.676 m)   Wt 87.8 kg (193 lb 9 oz)   SpO2 97%   BMI 31.24 kg/m²     Objective:   Physical Exam  Vitals and nursing note reviewed.   Constitutional:       General: He is not in acute distress.     Appearance: He is well-developed. He is not diaphoretic.   HENT:      Head: Normocephalic and atraumatic.      Nose: Nose normal.      Mouth/Throat:      Pharynx: No oropharyngeal exudate.   Eyes:      General: No scleral icterus.        Right eye: No discharge.         Left eye: No discharge.      Conjunctiva/sclera: Conjunctivae normal.   Neck:      Thyroid: No thyromegaly.      Vascular: No JVD.   Cardiovascular:      Rate and Rhythm: Normal rate. Rhythm irregular.      " Heart sounds: S1 normal and S2 normal. No murmur heard.     No friction rub. No gallop. No S3 or S4 sounds.   Pulmonary:      Effort: Pulmonary effort is normal. No respiratory distress.      Breath sounds: Normal breath sounds. No stridor. No wheezing or rales.   Chest:      Chest wall: No tenderness.   Abdominal:      General: Bowel sounds are normal. There is no distension.      Palpations: Abdomen is soft. There is no mass.      Tenderness: There is no abdominal tenderness. There is no rebound.   Genitourinary:     Comments: Deferred  Musculoskeletal:         General: No tenderness or deformity. Normal range of motion.      Cervical back: Normal range of motion and neck supple.   Lymphadenopathy:      Cervical: No cervical adenopathy.   Skin:     General: Skin is warm and dry.      Coloration: Skin is not pale.      Findings: No erythema or rash.   Neurological:      Mental Status: He is alert and oriented to person, place, and time.      Motor: No abnormal muscle tone.      Coordination: Coordination normal.   Psychiatric:         Behavior: Behavior normal.         Thought Content: Thought content normal.         Judgment: Judgment normal.         Lab Results   Component Value Date     05/30/2024    K 4.9 05/30/2024     05/30/2024    CO2 23 05/30/2024    BUN 15 05/30/2024    CREATININE 1.0 05/30/2024     (H) 05/30/2024    HGBA1C 6.8 (H) 01/10/2025    AST 22 05/30/2024    ALT 33 01/10/2025    ALBUMIN 4.1 05/30/2024    ALBUMIN 4.2 09/04/2020    PROT 7.7 05/30/2024    BILITOT 0.6 05/30/2024    WBC 6.51 05/30/2024    HGB 16.1 05/30/2024    HCT 48.7 05/30/2024    MCV 93 05/30/2024     05/30/2024    TSH 2.703 05/30/2024    CHOL 121 01/10/2025    HDL 43 01/10/2025    LDLCALC 47.0 (L) 01/10/2025    TRIG 155 (H) 01/10/2025    BNP 85 04/01/2016     Assessment:      1. PAF (paroxysmal atrial fibrillation)    2. Paroxysmal atrial fibrillation    3. Essential hypertension    4. Other hyperlipidemia     5. KIKO (obstructive sleep apnea)    6. Hypertension associated with diabetes    7. Type 2 diabetes mellitus with other specified complication, without long-term current use of insulin    8. Acute chalazion of left eye          Plan:   1.PAFib s/p RICARDO/DCCV 10/2018 in [facility]  - stress negative in past  - cont BB and CCB   - cont Eliquis for AC  - will rate control now, if symptoms occur will refer to EP for ablation vs antiarrythmic vs repeat DCCV - defer for now    2. HTN  - cont meds    3. Sleep apnea   - cont CPAP     4. Obesity, BMI 29, goal < 170 lbs--> BMI 30 - 196 lbs -->  lbs  --> BMI 31 - 193 lbs   - cont weight loss    5. HLD with elevated Tgs - improved   - changed meds from Crestor to Lipitor   - improving now cont statin Pravastin     6. DM2 - A1c 6.6 -> 6.8  - cont tx and f.u PCP  - not on meds now    7. L eye chalazian  - refer to opthal    Visit today included increased complexity associated with the care of the episodic problem PAF addressed and managing the longitudinal care of the patient due to the serious and/or complex managed problem(s) .      Thank you for allowing me to participate in this patient's care. Please do not hesitate to contact me with any questions or concerns. Consult note has been forwarded to the referral physician.

## 2025-06-19 ENCOUNTER — OFFICE VISIT (OUTPATIENT)
Dept: OPHTHALMOLOGY | Facility: CLINIC | Age: 55
End: 2025-06-19
Payer: COMMERCIAL

## 2025-06-19 DIAGNOSIS — H02.825 CYST OF LEFT LOWER EYELID: Primary | ICD-10-CM

## 2025-06-19 PROCEDURE — 92002 INTRM OPH EXAM NEW PATIENT: CPT | Mod: S$GLB,,, | Performed by: OPHTHALMOLOGY

## 2025-06-19 PROCEDURE — 99999 PR PBB SHADOW E&M-EST. PATIENT-LVL III: CPT | Mod: PBBFAC,,, | Performed by: OPHTHALMOLOGY

## 2025-06-19 PROCEDURE — 4010F ACE/ARB THERAPY RXD/TAKEN: CPT | Mod: CPTII,S$GLB,, | Performed by: OPHTHALMOLOGY

## 2025-06-19 PROCEDURE — 1159F MED LIST DOCD IN RCRD: CPT | Mod: CPTII,S$GLB,, | Performed by: OPHTHALMOLOGY

## 2025-06-19 PROCEDURE — 1160F RVW MEDS BY RX/DR IN RCRD: CPT | Mod: CPTII,S$GLB,, | Performed by: OPHTHALMOLOGY

## 2025-06-19 PROCEDURE — 3044F HG A1C LEVEL LT 7.0%: CPT | Mod: CPTII,S$GLB,, | Performed by: OPHTHALMOLOGY

## 2025-06-19 NOTE — PROGRESS NOTES
HPI    Fabiot states he has been having a bump on hid eyelid for 2 years that   has been getting bigger. Hasn't did any remedies to help it go down.   Patient states vision is doing well. Denies any pain or discomfort.  No   other ocular complaints.  Last edited by Jose Francisco Burdick on 6/19/2025  2:01 PM.            Assessment /Plan     For exam results, see Encounter Report.    Cyst of left lower eyelid  Discussed RBA of excision. Patient elects to defer. Instructed patient to call if symptoms worsen.    Return to clinic as needed

## 2025-06-30 ENCOUNTER — TELEPHONE (OUTPATIENT)
Dept: PHARMACY | Facility: CLINIC | Age: 55
End: 2025-06-30
Payer: COMMERCIAL

## 2025-06-30 NOTE — TELEPHONE ENCOUNTER
[facility]/Population [facility] Chart Review & Patient Outreach Details For Medication Adherence Project    Reason for Outreach Encounter: 3rd Party payor non-compliance report ([employer], [employer], C, etc)  2.  Patient Outreach Method: Reviewed patient chart   3.   Medication in question:    Hyperlipidemia Medications              pravastatin (PRAVACHOL) 20 MG tablet Take 1 tablet (20 mg total) by mouth once daily.                  pravastatin   last filled  5/23/25 for 90 day supply    4.  Reviewed and or Updates Made To: Patient Chart  5. Outreach Outcomes and/or actions taken: Patient filled medication and is on track to be adherent  Additional Notes:

## 2025-07-08 ENCOUNTER — PATIENT OUTREACH (OUTPATIENT)
Dept: ADMINISTRATIVE | Facility: HOSPITAL | Age: 55
End: 2025-07-08
Payer: COMMERCIAL

## 2025-07-08 NOTE — PROGRESS NOTES
Lab visit report: Patient has upcoming lab appointment on 7/11/25 for recheck of diabetic labs.

## 2025-07-11 ENCOUNTER — APPOINTMENT (OUTPATIENT)
Dept: LAB | Facility: HOSPITAL | Age: 55
End: 2025-07-11
Attending: INTERNAL MEDICINE
Payer: COMMERCIAL

## 2025-07-23 DIAGNOSIS — I48.0 PAROXYSMAL ATRIAL FIBRILLATION: ICD-10-CM

## 2025-07-23 DIAGNOSIS — I48.0 PAF (PAROXYSMAL ATRIAL FIBRILLATION): ICD-10-CM

## 2025-07-24 RX ORDER — METOPROLOL SUCCINATE 100 MG/1
100 TABLET, EXTENDED RELEASE ORAL 2 TIMES DAILY
Qty: 180 TABLET | Refills: 3 | Status: SHIPPED | OUTPATIENT
Start: 2025-07-24

## 2025-08-03 ENCOUNTER — TELEPHONE (OUTPATIENT)
Dept: PHARMACY | Facility: CLINIC | Age: 55
End: 2025-08-03
Payer: COMMERCIAL

## 2025-08-04 NOTE — TELEPHONE ENCOUNTER
[facility]Population [facility] Chart Review & Patient Outreach Details For Medication Adherence Project    Reason for Outreach Encounter: 3rd Party payor non-compliance report ([employer], [employer], [employer], etc)  2.  Patient Outreach Method: Reviewed patient chart   3.   Medication in question:    Hyperlipidemia Medications              pravastatin (PRAVACHOL) 20 MG tablet Take 1 tablet (20 mg total) by mouth once daily.                  LF 90 ds 5/23/25    4.  Reviewed and or Updates Made To: Patient Chart  5. Outreach Outcomes and/or actions taken: Patient filled medication and is on track to be adherent  Additional Notes:

## (undated) DEVICE — CLOSURE SKIN STERI STRIP 1/2X4

## (undated) DEVICE — SYR ONLY LUER LOCK 20CC

## (undated) DEVICE — ADAPTER TUOHY BORST 6FR

## (undated) DEVICE — ADHESIVE MASTISOL VIAL 48/BX

## (undated) DEVICE — FIBER LASER HOLMIUM 550 MICRON

## (undated) DEVICE — ELECTRODE REM PLYHSV RETURN 9

## (undated) DEVICE — CONTAINER SPECIMEN STRL 4OZ

## (undated) DEVICE — SEE MEDLINE ITEM 154981

## (undated) DEVICE — GLOVE PROTEXIS HYDROGEL SZ8

## (undated) DEVICE — SEE MEDLINE ITEM 152622

## (undated) DEVICE — MANIFOLD 4 PORT

## (undated) DEVICE — GOWN SMARTGOWN LVL4 X-LONG XL

## (undated) DEVICE — WIRE GUIDE 0.038OLD

## (undated) DEVICE — UROVIEW 2600/2800

## (undated) DEVICE — SEE MEDLINE ITEM 157185

## (undated) DEVICE — GAUZE SPONGE 4X4 12PLY

## (undated) DEVICE — CANISTER SUCTION MEDI-VAC 12L

## (undated) DEVICE — SOL WATER STRL IRR 1000ML

## (undated) DEVICE — SOL IRR NACL .9% 3000ML

## (undated) DEVICE — SEE MEDLINE ITEM 152487

## (undated) DEVICE — BASKET STNE RTRVL HLC 3F 4WR O

## (undated) DEVICE — SPONGE GAUZE 16PLY 4X4

## (undated) DEVICE — SKIN MARKER DEVON 160

## (undated) DEVICE — SET IRR URLGY 2LINE UNIV SPIKE